# Patient Record
Sex: MALE | Race: ASIAN | NOT HISPANIC OR LATINO
[De-identification: names, ages, dates, MRNs, and addresses within clinical notes are randomized per-mention and may not be internally consistent; named-entity substitution may affect disease eponyms.]

---

## 2017-01-10 ENCOUNTER — MEDICATION RENEWAL (OUTPATIENT)
Age: 82
End: 2017-01-10

## 2017-01-12 ENCOUNTER — MEDICATION RENEWAL (OUTPATIENT)
Age: 82
End: 2017-01-12

## 2017-02-17 ENCOUNTER — MEDICATION RENEWAL (OUTPATIENT)
Age: 82
End: 2017-02-17

## 2017-03-07 ENCOUNTER — MEDICATION RENEWAL (OUTPATIENT)
Age: 82
End: 2017-03-07

## 2017-05-23 ENCOUNTER — APPOINTMENT (OUTPATIENT)
Dept: CARDIOLOGY | Facility: CLINIC | Age: 82
End: 2017-05-23

## 2017-05-23 ENCOUNTER — NON-APPOINTMENT (OUTPATIENT)
Age: 82
End: 2017-05-23

## 2017-05-23 VITALS
OXYGEN SATURATION: 91 % | DIASTOLIC BLOOD PRESSURE: 78 MMHG | BODY MASS INDEX: 30.07 KG/M2 | SYSTOLIC BLOOD PRESSURE: 144 MMHG | HEART RATE: 116 BPM | HEIGHT: 69 IN | WEIGHT: 203 LBS

## 2017-06-06 ENCOUNTER — RX RENEWAL (OUTPATIENT)
Age: 82
End: 2017-06-06

## 2017-06-06 ENCOUNTER — MEDICATION RENEWAL (OUTPATIENT)
Age: 82
End: 2017-06-06

## 2017-10-02 ENCOUNTER — MEDICATION RENEWAL (OUTPATIENT)
Age: 82
End: 2017-10-02

## 2017-11-09 ENCOUNTER — APPOINTMENT (OUTPATIENT)
Dept: CARDIOLOGY | Facility: CLINIC | Age: 82
End: 2017-11-09
Payer: MEDICARE

## 2017-11-09 VITALS
DIASTOLIC BLOOD PRESSURE: 76 MMHG | HEIGHT: 69 IN | WEIGHT: 202 LBS | HEART RATE: 96 BPM | BODY MASS INDEX: 29.92 KG/M2 | SYSTOLIC BLOOD PRESSURE: 142 MMHG

## 2017-11-09 DIAGNOSIS — K59.00 CONSTIPATION, UNSPECIFIED: ICD-10-CM

## 2017-11-09 PROCEDURE — 99215 OFFICE O/P EST HI 40 MIN: CPT

## 2017-11-09 PROCEDURE — 93000 ELECTROCARDIOGRAM COMPLETE: CPT

## 2017-12-13 ENCOUNTER — RX RENEWAL (OUTPATIENT)
Age: 82
End: 2017-12-13

## 2018-04-10 ENCOUNTER — MEDICATION RENEWAL (OUTPATIENT)
Age: 83
End: 2018-04-10

## 2018-05-17 ENCOUNTER — APPOINTMENT (OUTPATIENT)
Dept: CARDIOLOGY | Facility: CLINIC | Age: 83
End: 2018-05-17
Payer: MEDICARE

## 2018-05-17 ENCOUNTER — MEDICATION RENEWAL (OUTPATIENT)
Age: 83
End: 2018-05-17

## 2018-05-17 ENCOUNTER — NON-APPOINTMENT (OUTPATIENT)
Age: 83
End: 2018-05-17

## 2018-05-17 VITALS
OXYGEN SATURATION: 95 % | HEART RATE: 114 BPM | TEMPERATURE: 98.8 F | WEIGHT: 199 LBS | HEIGHT: 69 IN | BODY MASS INDEX: 29.47 KG/M2 | SYSTOLIC BLOOD PRESSURE: 150 MMHG | DIASTOLIC BLOOD PRESSURE: 73 MMHG

## 2018-05-17 DIAGNOSIS — M17.11 UNILATERAL PRIMARY OSTEOARTHRITIS, RIGHT KNEE: ICD-10-CM

## 2018-05-17 PROCEDURE — 93000 ELECTROCARDIOGRAM COMPLETE: CPT

## 2018-05-17 PROCEDURE — 99214 OFFICE O/P EST MOD 30 MIN: CPT

## 2018-05-17 RX ORDER — DICLOFENAC SODIUM 10 MG/G
1 GEL TOPICAL
Qty: 3 | Refills: 3 | Status: DISCONTINUED | COMMUNITY
Start: 2017-11-09 | End: 2018-05-17

## 2018-05-22 ENCOUNTER — RX RENEWAL (OUTPATIENT)
Age: 83
End: 2018-05-22

## 2018-10-05 DIAGNOSIS — R35.8 XXOTHER POLYURIA: ICD-10-CM

## 2018-11-19 ENCOUNTER — MEDICATION RENEWAL (OUTPATIENT)
Age: 83
End: 2018-11-19

## 2018-11-19 ENCOUNTER — NON-APPOINTMENT (OUTPATIENT)
Age: 83
End: 2018-11-19

## 2018-11-19 ENCOUNTER — APPOINTMENT (OUTPATIENT)
Dept: CARDIOLOGY | Facility: CLINIC | Age: 83
End: 2018-11-19
Payer: MEDICARE

## 2018-11-19 VITALS
BODY MASS INDEX: 29.47 KG/M2 | DIASTOLIC BLOOD PRESSURE: 80 MMHG | OXYGEN SATURATION: 95 % | SYSTOLIC BLOOD PRESSURE: 154 MMHG | WEIGHT: 199 LBS | HEIGHT: 69 IN | HEART RATE: 120 BPM

## 2018-11-19 PROCEDURE — 93000 ELECTROCARDIOGRAM COMPLETE: CPT

## 2018-11-19 PROCEDURE — 99214 OFFICE O/P EST MOD 30 MIN: CPT

## 2019-05-07 ENCOUNTER — APPOINTMENT (OUTPATIENT)
Dept: CARDIOLOGY | Facility: CLINIC | Age: 84
End: 2019-05-07

## 2019-05-15 ENCOUNTER — RX RENEWAL (OUTPATIENT)
Age: 84
End: 2019-05-15

## 2019-05-21 ENCOUNTER — APPOINTMENT (OUTPATIENT)
Dept: CARDIOLOGY | Facility: CLINIC | Age: 84
End: 2019-05-21
Payer: MEDICARE

## 2019-05-21 ENCOUNTER — NON-APPOINTMENT (OUTPATIENT)
Age: 84
End: 2019-05-21

## 2019-05-21 VITALS
HEART RATE: 107 BPM | WEIGHT: 199 LBS | BODY MASS INDEX: 30.16 KG/M2 | SYSTOLIC BLOOD PRESSURE: 120 MMHG | HEIGHT: 68 IN | DIASTOLIC BLOOD PRESSURE: 70 MMHG | OXYGEN SATURATION: 99 %

## 2019-05-21 DIAGNOSIS — M54.5 LOW BACK PAIN: ICD-10-CM

## 2019-05-21 PROCEDURE — 99215 OFFICE O/P EST HI 40 MIN: CPT

## 2019-05-21 PROCEDURE — 93000 ELECTROCARDIOGRAM COMPLETE: CPT

## 2019-05-24 ENCOUNTER — RX RENEWAL (OUTPATIENT)
Age: 84
End: 2019-05-24

## 2019-07-26 ENCOUNTER — APPOINTMENT (OUTPATIENT)
Dept: CARDIOLOGY | Facility: CLINIC | Age: 84
End: 2019-07-26
Payer: MEDICARE

## 2019-07-26 VITALS
HEIGHT: 68 IN | SYSTOLIC BLOOD PRESSURE: 134 MMHG | DIASTOLIC BLOOD PRESSURE: 70 MMHG | HEART RATE: 106 BPM | BODY MASS INDEX: 30.62 KG/M2 | OXYGEN SATURATION: 98 % | WEIGHT: 202 LBS

## 2019-07-26 PROCEDURE — 99214 OFFICE O/P EST MOD 30 MIN: CPT

## 2019-08-01 ENCOUNTER — APPOINTMENT (OUTPATIENT)
Dept: CARDIOLOGY | Facility: CLINIC | Age: 84
End: 2019-08-01
Payer: MEDICARE

## 2019-08-01 PROCEDURE — 93970 EXTREMITY STUDY: CPT

## 2019-08-13 ENCOUNTER — APPOINTMENT (OUTPATIENT)
Dept: CARDIOLOGY | Facility: CLINIC | Age: 84
End: 2019-08-13

## 2019-08-14 ENCOUNTER — APPOINTMENT (OUTPATIENT)
Dept: CARDIOLOGY | Facility: CLINIC | Age: 84
End: 2019-08-14
Payer: MEDICARE

## 2019-08-14 PROCEDURE — 93306 TTE W/DOPPLER COMPLETE: CPT

## 2019-08-16 ENCOUNTER — APPOINTMENT (OUTPATIENT)
Dept: CARDIOLOGY | Facility: CLINIC | Age: 84
End: 2019-08-16

## 2019-08-27 ENCOUNTER — OTHER (OUTPATIENT)
Age: 84
End: 2019-08-27

## 2019-08-28 ENCOUNTER — APPOINTMENT (OUTPATIENT)
Dept: CARDIOLOGY | Facility: CLINIC | Age: 84
End: 2019-08-28
Payer: MEDICARE

## 2019-08-28 VITALS — HEART RATE: 116 BPM | OXYGEN SATURATION: 96 % | DIASTOLIC BLOOD PRESSURE: 62 MMHG | SYSTOLIC BLOOD PRESSURE: 122 MMHG

## 2019-08-28 PROCEDURE — 99211 OFF/OP EST MAY X REQ PHY/QHP: CPT

## 2019-09-19 ENCOUNTER — APPOINTMENT (OUTPATIENT)
Dept: ORTHOPEDIC SURGERY | Facility: CLINIC | Age: 84
End: 2019-09-19

## 2019-09-23 ENCOUNTER — APPOINTMENT (OUTPATIENT)
Dept: ORTHOPEDIC SURGERY | Facility: CLINIC | Age: 84
End: 2019-09-23
Payer: MEDICARE

## 2019-09-23 VITALS — HEART RATE: 116 BPM | SYSTOLIC BLOOD PRESSURE: 129 MMHG | DIASTOLIC BLOOD PRESSURE: 82 MMHG

## 2019-09-23 VITALS — WEIGHT: 190 LBS | HEIGHT: 69 IN | BODY MASS INDEX: 28.14 KG/M2

## 2019-09-23 PROCEDURE — 72100 X-RAY EXAM L-S SPINE 2/3 VWS: CPT

## 2019-09-23 PROCEDURE — 99204 OFFICE O/P NEW MOD 45 MIN: CPT

## 2019-09-23 NOTE — REASON FOR VISIT
[Initial Visit] : an initial visit for [Degenerative Joint Disease] : degenerative joint disease [Back Pain] : back pain [Spouse] : spouse

## 2019-09-23 NOTE — HISTORY OF PRESENT ILLNESS
[de-identified] : This 84-year-old male is referred by Dr. Vladimir Dominguez for evaluation of spine related symptoms.  There is no history of injury.  He has a little more than 2 years of lower back pain without associated buttock or leg pain.  There is no Valsalva effect and he is not had associated neurologic symptoms.  There is no night pain.  The pain is worse with sitting and driving.  It is no worse walking.  It is worse standing for more than 10 or 15 minutes.  He was active at tennis until 4 years ago when he had a fall on the tennis court.  He is a long-term diabetic who is insulin-dependent.  His hemoglobin A1c is quite high at 9.7.  Several recent creatinines are normal. [Pain Location] : pain [Worsening] : worsening [6] : a maximum pain level of 6/10 [Bending] : worsened by bending [Lifting] : worsened by lifting [Prolonged Sitting] : worsened by prolonged sitting [Prolonged Standing] : worsened by prolonged standing [Sitting] : worsened by sitting [Walking] : not worsened by walking [Standing] : worsened by standing

## 2019-09-23 NOTE — PHYSICAL EXAM
[de-identified] : He is fully alert and oriented with a normal mood and affect.  He is in no acute distress.  He is overweight.  He ambulates with a slow and slightly unsteady gait.  He can tiptoe and heel walk.  There are no cutaneous abnormalities or palpable bony defects of the spine.  There is no evidence of shortness of breath or respiratory distress.  There is no paravertebral muscle spasm but sidebending is limited.  There is no sciatic or trochanteric tenderness.  Forward flexion of the spine is limited to 40 degrees by lower back pain.  Despite his long-term diabetes his lower extremity reflexes are 1+ and symmetrical.  Motor and sensory exam is normal and straight leg raising is negative to 90 degrees.  There is a mild bilateral restriction of range of motion of his knees which are stable.  Vascular exam reveals superficial varicosities.  There is no lymphedema.  There are no cutaneous abnormalities of the upper or lower extremities.  His upper extremities are normal to inspection and his elbows have a full range of motion with normal motor power and stability. [de-identified] : AP and lateral x-rays of the lumbar spine reveal significant multilevel degenerative changes.  There is relative loss of lumbar lordosis and some thoracolumbar kyphosis.  There are no destructive changes.

## 2019-09-23 NOTE — DISCUSSION/SUMMARY
[de-identified] : He will use moist heat and he has been started on diclofenac 75 mg twice a day as a nonsteroidal anti-inflammatory.  He will call if there are problems with the medication or worsening of his symptoms and I will see him for follow-up in 3-1/2 weeks. [Medication Risks Reviewed] : Medication risks reviewed

## 2019-10-15 ENCOUNTER — RX RENEWAL (OUTPATIENT)
Age: 84
End: 2019-10-15

## 2019-10-17 ENCOUNTER — APPOINTMENT (OUTPATIENT)
Dept: ORTHOPEDIC SURGERY | Facility: CLINIC | Age: 84
End: 2019-10-17
Payer: MEDICARE

## 2019-10-17 PROCEDURE — 99214 OFFICE O/P EST MOD 30 MIN: CPT

## 2019-10-17 NOTE — HISTORY OF PRESENT ILLNESS
[de-identified] : He has not had problems tolerating the diclofenac.  He has not seen much improvement but he is taking the medication only once a day.  He feels he might of had some help with acupuncture.

## 2019-10-17 NOTE — PHYSICAL EXAM
[de-identified] : He appears comfortable sitting today and straight leg raising remains negative at 90 degrees.

## 2019-10-17 NOTE — DISCUSSION/SUMMARY
[Medication Risks Reviewed] : Medication risks reviewed [de-identified] : He is also on Cymbalta for his peripheral neuropathy.  He will increase the diclofenac to twice a day as an usually prescribed and I will see him for follow-up in 5 weeks.  He has been given a prescription to obtain a liver function profile 1 week prior to his next visit.

## 2019-10-25 ENCOUNTER — NON-APPOINTMENT (OUTPATIENT)
Age: 84
End: 2019-10-25

## 2019-10-25 ENCOUNTER — APPOINTMENT (OUTPATIENT)
Dept: CARDIOLOGY | Facility: CLINIC | Age: 84
End: 2019-10-25
Payer: MEDICARE

## 2019-10-25 VITALS
SYSTOLIC BLOOD PRESSURE: 131 MMHG | BODY MASS INDEX: 28.29 KG/M2 | HEART RATE: 122 BPM | TEMPERATURE: 97.9 F | OXYGEN SATURATION: 94 % | HEIGHT: 69 IN | DIASTOLIC BLOOD PRESSURE: 84 MMHG | RESPIRATION RATE: 16 BRPM | WEIGHT: 191 LBS

## 2019-10-25 DIAGNOSIS — I45.10 UNSPECIFIED RIGHT BUNDLE-BRANCH BLOCK: ICD-10-CM

## 2019-10-25 DIAGNOSIS — R00.0 TACHYCARDIA, UNSPECIFIED: ICD-10-CM

## 2019-10-25 PROCEDURE — 93000 ELECTROCARDIOGRAM COMPLETE: CPT

## 2019-10-25 PROCEDURE — 99214 OFFICE O/P EST MOD 30 MIN: CPT

## 2019-10-29 ENCOUNTER — APPOINTMENT (OUTPATIENT)
Dept: ORTHOPEDIC SURGERY | Facility: CLINIC | Age: 84
End: 2019-10-29

## 2019-11-13 ENCOUNTER — MEDICATION RENEWAL (OUTPATIENT)
Age: 84
End: 2019-11-13

## 2019-11-21 ENCOUNTER — APPOINTMENT (OUTPATIENT)
Dept: ORTHOPEDIC SURGERY | Facility: CLINIC | Age: 84
End: 2019-11-21
Payer: MEDICARE

## 2019-11-21 DIAGNOSIS — G89.29 LOW BACK PAIN: ICD-10-CM

## 2019-11-21 DIAGNOSIS — M54.5 LOW BACK PAIN: ICD-10-CM

## 2019-11-21 PROCEDURE — 99214 OFFICE O/P EST MOD 30 MIN: CPT

## 2019-11-22 ENCOUNTER — APPOINTMENT (OUTPATIENT)
Dept: ORTHOPEDIC SURGERY | Facility: CLINIC | Age: 84
End: 2019-11-22

## 2019-11-22 PROBLEM — M54.5 CHRONIC BILATERAL LOW BACK PAIN WITHOUT SCIATICA: Status: ACTIVE | Noted: 2019-09-23

## 2019-11-22 NOTE — DISCUSSION/SUMMARY
[Medication Risks Reviewed] : Medication risks reviewed [de-identified] : He has been switched to ibuprofen 800 mg 3 times a day.  I have recommended a trial of physical therapy with modalities only.  I will see him for follow-up in 4 weeks.  We discussed that the next step would be a course of prednisone but it would obviously raise his blood sugars.

## 2019-11-22 NOTE — HISTORY OF PRESENT ILLNESS
[de-identified] : He has taken the diclofenac with regularity since his last visit and he any improvement.  In fact he feels his lower back pain may be slightly worse.  His liver function profile is normal. [Pain Location] : pain [Worsening] : worsening [5] : a maximum pain level of 5/10

## 2019-11-28 ENCOUNTER — EMERGENCY (EMERGENCY)
Facility: HOSPITAL | Age: 84
LOS: 1 days | Discharge: HOME | End: 2019-11-28
Attending: EMERGENCY MEDICINE
Payer: MEDICARE

## 2019-11-28 VITALS
RESPIRATION RATE: 18 BRPM | TEMPERATURE: 98 F | SYSTOLIC BLOOD PRESSURE: 164 MMHG | OXYGEN SATURATION: 99 % | HEART RATE: 93 BPM | DIASTOLIC BLOOD PRESSURE: 100 MMHG

## 2019-11-28 VITALS
SYSTOLIC BLOOD PRESSURE: 166 MMHG | HEART RATE: 104 BPM | HEIGHT: 70 IN | TEMPERATURE: 98 F | DIASTOLIC BLOOD PRESSURE: 91 MMHG | RESPIRATION RATE: 18 BRPM | WEIGHT: 192.02 LBS | OXYGEN SATURATION: 98 %

## 2019-11-28 LAB
ALBUMIN SERPL ELPH-MCNC: 4.6 G/DL — SIGNIFICANT CHANGE UP (ref 3.3–5)
ALP SERPL-CCNC: 53 U/L — SIGNIFICANT CHANGE UP (ref 40–120)
ALT FLD-CCNC: 23 U/L — SIGNIFICANT CHANGE UP (ref 10–45)
ANION GAP SERPL CALC-SCNC: 17 MMOL/L — SIGNIFICANT CHANGE UP (ref 5–17)
AST SERPL-CCNC: 18 U/L — SIGNIFICANT CHANGE UP (ref 10–40)
BASOPHILS # BLD AUTO: 0.03 K/UL — SIGNIFICANT CHANGE UP (ref 0–0.2)
BASOPHILS NFR BLD AUTO: 0.3 % — SIGNIFICANT CHANGE UP (ref 0–2)
BILIRUB SERPL-MCNC: 0.4 MG/DL — SIGNIFICANT CHANGE UP (ref 0.2–1.2)
BUN SERPL-MCNC: 31 MG/DL — HIGH (ref 7–23)
CALCIUM SERPL-MCNC: 9.7 MG/DL — SIGNIFICANT CHANGE UP (ref 8.4–10.5)
CHLORIDE SERPL-SCNC: 103 MMOL/L — SIGNIFICANT CHANGE UP (ref 96–108)
CO2 SERPL-SCNC: 25 MMOL/L — SIGNIFICANT CHANGE UP (ref 22–31)
CREAT SERPL-MCNC: 1.46 MG/DL — HIGH (ref 0.5–1.3)
EOSINOPHIL # BLD AUTO: 0.41 K/UL — SIGNIFICANT CHANGE UP (ref 0–0.5)
EOSINOPHIL NFR BLD AUTO: 3.9 % — SIGNIFICANT CHANGE UP (ref 0–6)
GLUCOSE SERPL-MCNC: 66 MG/DL — LOW (ref 70–99)
HCT VFR BLD CALC: 41.2 % — SIGNIFICANT CHANGE UP (ref 39–50)
HGB BLD-MCNC: 13.9 G/DL — SIGNIFICANT CHANGE UP (ref 13–17)
IMM GRANULOCYTES NFR BLD AUTO: 0.4 % — SIGNIFICANT CHANGE UP (ref 0–1.5)
LYMPHOCYTES # BLD AUTO: 1.4 K/UL — SIGNIFICANT CHANGE UP (ref 1–3.3)
LYMPHOCYTES # BLD AUTO: 13.4 % — SIGNIFICANT CHANGE UP (ref 13–44)
MCHC RBC-ENTMCNC: 30.8 PG — SIGNIFICANT CHANGE UP (ref 27–34)
MCHC RBC-ENTMCNC: 33.7 GM/DL — SIGNIFICANT CHANGE UP (ref 32–36)
MCV RBC AUTO: 91.2 FL — SIGNIFICANT CHANGE UP (ref 80–100)
MONOCYTES # BLD AUTO: 0.64 K/UL — SIGNIFICANT CHANGE UP (ref 0–0.9)
MONOCYTES NFR BLD AUTO: 6.1 % — SIGNIFICANT CHANGE UP (ref 2–14)
NEUTROPHILS # BLD AUTO: 7.95 K/UL — HIGH (ref 1.8–7.4)
NEUTROPHILS NFR BLD AUTO: 75.9 % — SIGNIFICANT CHANGE UP (ref 43–77)
NRBC # BLD: 0 /100 WBCS — SIGNIFICANT CHANGE UP (ref 0–0)
PLATELET # BLD AUTO: 208 K/UL — SIGNIFICANT CHANGE UP (ref 150–400)
POTASSIUM SERPL-MCNC: 3.8 MMOL/L — SIGNIFICANT CHANGE UP (ref 3.5–5.3)
POTASSIUM SERPL-SCNC: 3.8 MMOL/L — SIGNIFICANT CHANGE UP (ref 3.5–5.3)
PROT SERPL-MCNC: 7 G/DL — SIGNIFICANT CHANGE UP (ref 6–8.3)
RBC # BLD: 4.52 M/UL — SIGNIFICANT CHANGE UP (ref 4.2–5.8)
RBC # FLD: 12.3 % — SIGNIFICANT CHANGE UP (ref 10.3–14.5)
SODIUM SERPL-SCNC: 145 MMOL/L — SIGNIFICANT CHANGE UP (ref 135–145)
WBC # BLD: 10.47 K/UL — SIGNIFICANT CHANGE UP (ref 3.8–10.5)
WBC # FLD AUTO: 10.47 K/UL — SIGNIFICANT CHANGE UP (ref 3.8–10.5)

## 2019-11-28 PROCEDURE — 70450 CT HEAD/BRAIN W/O DYE: CPT | Mod: 26

## 2019-11-28 PROCEDURE — 85027 COMPLETE CBC AUTOMATED: CPT

## 2019-11-28 PROCEDURE — 99284 EMERGENCY DEPT VISIT MOD MDM: CPT | Mod: 25

## 2019-11-28 PROCEDURE — 99284 EMERGENCY DEPT VISIT MOD MDM: CPT | Mod: GC

## 2019-11-28 PROCEDURE — 90715 TDAP VACCINE 7 YRS/> IM: CPT

## 2019-11-28 PROCEDURE — 70450 CT HEAD/BRAIN W/O DYE: CPT

## 2019-11-28 PROCEDURE — 73130 X-RAY EXAM OF HAND: CPT

## 2019-11-28 PROCEDURE — 80053 COMPREHEN METABOLIC PANEL: CPT

## 2019-11-28 PROCEDURE — 73130 X-RAY EXAM OF HAND: CPT | Mod: 26,LT

## 2019-11-28 PROCEDURE — 73564 X-RAY EXAM KNEE 4 OR MORE: CPT

## 2019-11-28 PROCEDURE — 73564 X-RAY EXAM KNEE 4 OR MORE: CPT | Mod: 26,50

## 2019-11-28 PROCEDURE — 90471 IMMUNIZATION ADMIN: CPT

## 2019-11-28 RX ORDER — TETANUS TOXOID, REDUCED DIPHTHERIA TOXOID AND ACELLULAR PERTUSSIS VACCINE, ADSORBED 5; 2.5; 8; 8; 2.5 [IU]/.5ML; [IU]/.5ML; UG/.5ML; UG/.5ML; UG/.5ML
0.5 SUSPENSION INTRAMUSCULAR ONCE
Refills: 0 | Status: COMPLETED | OUTPATIENT
Start: 2019-11-28 | End: 2019-11-28

## 2019-11-28 RX ADMIN — TETANUS TOXOID, REDUCED DIPHTHERIA TOXOID AND ACELLULAR PERTUSSIS VACCINE, ADSORBED 0.5 MILLILITER(S): 5; 2.5; 8; 8; 2.5 SUSPENSION INTRAMUSCULAR at 13:40

## 2019-11-28 NOTE — ED PROVIDER NOTE - PATIENT PORTAL LINK FT
You can access the FollowMyHealth Patient Portal offered by [facility] by registering at the following website: [email or web address]. By joining [employer] [employer] [facility] [employer]’s FollowMyHealth portal, you will also be able to view your health information using other applications (apps) compatible with our system.

## 2019-11-28 NOTE — ED PROVIDER NOTE - PHYSICAL EXAMINATION
PHYSICAL EXAM:  GENERAL: NAD, well-developed  HEAD:  Atraumatic, Normocephalic  EYES: EOMI, PERRLA, conjunctiva and sclera clear  NECK: Supple, No JVD  CHEST/LUNG: Clear to auscultation bilaterally; No wheeze  HEART: Regular rate and rhythm; No murmurs, rubs, or gallops  ABDOMEN: Soft, Nontender, Nondistended; Bowel sounds present  EXTREMITIES:  2+ Peripheral Pulses, No clubbing, cyanosis, or edema  PSYCH: AAOx3  NEUROLOGY: 5/5 strength in all 4 extremities. CN II-XII intact. appropriate gait with cane  SKIN: large right frontal head hematoma with abrasion. b/l knee and left wrist abrasions

## 2019-11-28 NOTE — ED PROVIDER NOTE - ATTENDING CONTRIBUTION TO CARE
Private Physician Vladimir Mendieta  84y male pmh DM, HTN, BPH, HLD now off statins, Pt was walking to train and slip on loose gravel. Pt struck head, Left palm and jamila knees, pt ambulates with cane after fall safely. No nvdc,cp,sob,palps, abd pain. PE WDWN male awake HEENT abrasion/contusion rt forehead. neck supple neg by nexus, no battles racoons, Chest clear anterior & posterior abd soft +bs no mass/guarding/rebound, Pelvis stable. Chest clear anterior & posterior abd soft +bs neuro no focal defects. gcs 15 speeh fluent power 5.5 all extr, gait stable with assistance. MSK jamila abrasions knees, Left hand palmar abrasion, all tendons nl. sensation intactr  James Cruz MD, Facep

## 2019-11-28 NOTE — ED PROVIDER NOTE - OBJECTIVE STATEMENT
Patient is a 83 yo male with PMHx of DM (on insulin), HTN, BPH, HLD (now off statins) who presents s/p fall. Patient was about to board a train when he slipped on the gravel and hit his head. Patient denies any LOC and fall was witnessed by his wife. Patient then presented to the ED. Denies and LOC, changes in vision, CP, palpitations, SOB, abdominal pain, N/V, weakness.

## 2019-11-28 NOTE — ED ADULT NURSE NOTE - NSIMPLEMENTINTERV_GEN_ALL_ED
Implemented All Fall Risk Interventions:  [place] to call system. Call bell, personal items and telephone within reach. Instruct patient to call for assistance. Room bathroom lighting operational. Non-slip footwear when patient is off stretcher. Physically safe environment: no spills, clutter or unnecessary equipment. Stretcher in lowest position, wheels locked, appropriate side rails in place. Provide visual cue, wrist band, yellow gown, etc. Monitor gait and stability. Monitor for mental status changes and reorient to person, place, and time. Review medications for side effects contributing to fall risk. Reinforce activity limits and safety measures with patient and family.

## 2019-11-28 NOTE — ED PROVIDER NOTE - NS ED ROS FT
REVIEW OF SYSTEMS:    CONSTITUTIONAL: No weakness, fevers or chills  EYES/ENT: No visual changes;  No vertigo or throat pain   NECK: No pain or stiffness  RESPIRATORY: No cough, wheezing, hemoptysis; No shortness of breath  CARDIOVASCULAR: No chest pain or palpitations  GASTROINTESTINAL: No abdominal or epigastric pain. No nausea, vomiting, or hematemesis; No diarrhea or constipation. No melena or hematochezia.  GENITOURINARY: No dysuria, frequency or hematuria  NEUROLOGICAL: No numbness or weakness  SKIN: head, left wrist and b/l knee abrasion

## 2019-11-28 NOTE — ED PROVIDER NOTE - NSFOLLOWUPINSTRUCTIONS_ED_ALL_ED_FT
1. TAKE ALL MEDICATIONS AS DIRECTED.    2. FOR PAIN OR FEVER YOU CAN TAKE IBUPROFEN (MOTRIN, ADVIL) OR ACETAMINOPHEN (TYLENOL) AS NEEDED, AS DIRECTED ON PACKAGING.  3. FOLLOW UP WITH YOUR PRIMARY DOCTOR WITHIN 5 DAYS AS DIRECTED.  4. IF YOU HAD LABS OR IMAGING DONE, YOU WERE GIVEN COPIES OF ALL LABS AND/OR IMAGING RESULTS FROM YOUR ER VISIT--PLEASE TAKE THEM WITH YOU TO YOUR FOLLOW UP APPOINTMENTS.  5. IF NEEDED, CALL PATIENT ACCESS SERVICES AT [phone] TO FIND A PRIMARY CARE PHYSICIAN.  OR CALL [phone] TO MAKE AN APPOINTMENT WITH THE CLINIC.  6. RETURN TO THE ER FOR ANY WORSENING SYMPTOMS OR CONCERNS.

## 2019-11-28 NOTE — ED ADULT TRIAGE NOTE - CHIEF COMPLAINT QUOTE
head injury/ abrasion to head s/p tripped and fell on the ground,  witnessed by wife, no dizziness/ lightheaded prior to fall, no LOC, no blood thinners, able to ambulate with cane slowly afterwards as per wife.

## 2019-11-28 NOTE — ED PROVIDER NOTE - CLINICAL SUMMARY MEDICAL DECISION MAKING FREE TEXT BOX
Patient is a 85 yo male with PMHx of DM (on insulin), HTN, BPH, HLD (now off statins) who presents s/p fall. Will check labs and imaging. Will give TDAP

## 2019-11-28 NOTE — ED ADULT NURSE NOTE - OBJECTIVE STATEMENT
84 y.o. Male presents to the ED c/o head injury s/p trip and fall. Hx diabetes - on insulin. As per wife at bedside, [place] over gravel on the floor and fell forwards. Wife states she witnessed the whole episode. Pt was able to ambulate afterwards with cane. Ambulates with cane at baseline. Denies LOC. Abrasion on R top of head, b/l knees, L hand. Denies CP, SOB, N/V/D, urinary/bowel complications, fever/chills. A&Ox3. Neuro intact. Denies pain at the moment and blood thinner use. Pt is in no current distress. Comfort and safety provided. Will continue to monitor. Dr. Cruz at bedside for assessment. 84 y.o. Male presents to the ED c/o head injury s/p trip and fall. Hx diabetes - on insulin. As per wife at bedside, [place] over gravel on the floor and fell forwards. Wife states she witnessed the whole episode. Pt was able to ambulate afterwards with cane. Ambulates with cane at baseline. Denies LOC. Abrasion on R frontal lobe area, b/l knees, L hand. Denies CP, SOB, N/V/D, urinary/bowel complications, fever/chills. A&Ox3. Neuro intact. Denies pain at the moment and blood thinner use. Pt is in no current distress. Comfort and safety provided. Will continue to monitor. Dr. Cruz at bedside for assessment.

## 2019-12-02 ENCOUNTER — APPOINTMENT (OUTPATIENT)
Dept: CARDIOLOGY | Facility: CLINIC | Age: 84
End: 2019-12-02
Payer: MEDICARE

## 2019-12-02 VITALS
OXYGEN SATURATION: 96 % | DIASTOLIC BLOOD PRESSURE: 90 MMHG | HEIGHT: 69 IN | SYSTOLIC BLOOD PRESSURE: 160 MMHG | HEART RATE: 101 BPM | BODY MASS INDEX: 28.29 KG/M2 | WEIGHT: 191 LBS

## 2019-12-02 DIAGNOSIS — R05 COUGH: ICD-10-CM

## 2019-12-02 DIAGNOSIS — S09.93XA UNSPECIFIED INJURY OF FACE, INITIAL ENCOUNTER: ICD-10-CM

## 2019-12-02 DIAGNOSIS — R32 UNSPECIFIED URINARY INCONTINENCE: ICD-10-CM

## 2019-12-02 PROBLEM — E11.9 TYPE 2 DIABETES MELLITUS WITHOUT COMPLICATIONS: Chronic | Status: ACTIVE | Noted: 2019-11-28

## 2019-12-02 PROBLEM — E78.5 HYPERLIPIDEMIA, UNSPECIFIED: Chronic | Status: ACTIVE | Noted: 2019-11-28

## 2019-12-02 PROBLEM — I10 ESSENTIAL (PRIMARY) HYPERTENSION: Chronic | Status: ACTIVE | Noted: 2019-11-28

## 2019-12-02 PROBLEM — N40.0 BENIGN PROSTATIC HYPERPLASIA WITHOUT LOWER URINARY TRACT SYMPTOMS: Chronic | Status: ACTIVE | Noted: 2019-11-28

## 2019-12-02 PROCEDURE — 99215 OFFICE O/P EST HI 40 MIN: CPT

## 2019-12-02 PROCEDURE — 93000 ELECTROCARDIOGRAM COMPLETE: CPT

## 2019-12-02 RX ORDER — DICLOFENAC SODIUM 75 MG/1
75 TABLET, DELAYED RELEASE ORAL
Qty: 60 | Refills: 0 | Status: DISCONTINUED | COMMUNITY
Start: 2019-09-23 | End: 2019-12-02

## 2019-12-02 RX ORDER — METOPROLOL SUCCINATE 100 MG/1
100 TABLET, EXTENDED RELEASE ORAL DAILY
Qty: 90 | Refills: 3 | Status: DISCONTINUED | COMMUNITY
Start: 2019-10-25 | End: 2019-12-02

## 2019-12-03 LAB
APPEARANCE: CLEAR
BACTERIA: NEGATIVE
BILIRUBIN URINE: NEGATIVE
BLOOD URINE: NEGATIVE
CALCIUM OXALATE CRYSTALS: ABNORMAL
COLOR: NORMAL
GLUCOSE QUALITATIVE U: NORMAL
HYALINE CASTS: 0 /LPF
KETONES URINE: NEGATIVE
LEUKOCYTE ESTERASE URINE: NEGATIVE
MICROSCOPIC-UA: NORMAL
NITRITE URINE: NEGATIVE
PH URINE: 7.5
PROTEIN URINE: NORMAL
RED BLOOD CELLS URINE: 1 /HPF
SPECIFIC GRAVITY URINE: 1.02
SQUAMOUS EPITHELIAL CELLS: 3 /HPF
UROBILINOGEN URINE: NORMAL
WHITE BLOOD CELLS URINE: 4 /HPF

## 2019-12-04 LAB — BACTERIA UR CULT: NORMAL

## 2019-12-10 ENCOUNTER — APPOINTMENT (OUTPATIENT)
Dept: CARDIOLOGY | Facility: CLINIC | Age: 84
End: 2019-12-10
Payer: MEDICARE

## 2019-12-10 VITALS
HEIGHT: 69 IN | OXYGEN SATURATION: 96 % | BODY MASS INDEX: 29.03 KG/M2 | DIASTOLIC BLOOD PRESSURE: 82 MMHG | SYSTOLIC BLOOD PRESSURE: 156 MMHG | HEART RATE: 110 BPM | WEIGHT: 196 LBS

## 2019-12-10 PROCEDURE — 99213 OFFICE O/P EST LOW 20 MIN: CPT

## 2019-12-12 ENCOUNTER — RX RENEWAL (OUTPATIENT)
Age: 84
End: 2019-12-12

## 2019-12-15 ENCOUNTER — INPATIENT (INPATIENT)
Facility: HOSPITAL | Age: 84
LOS: 8 days | Discharge: NURSING, REHAB OR LONG-TERM CARE | DRG: 871 | End: 2019-12-24
Attending: INTERNAL MEDICINE | Admitting: INTERNAL MEDICINE
Payer: MEDICARE

## 2019-12-15 VITALS
TEMPERATURE: 98 F | HEART RATE: 94 BPM | OXYGEN SATURATION: 100 % | SYSTOLIC BLOOD PRESSURE: 131 MMHG | DIASTOLIC BLOOD PRESSURE: 71 MMHG | RESPIRATION RATE: 16 BRPM

## 2019-12-15 DIAGNOSIS — R29.6 REPEATED FALLS: ICD-10-CM

## 2019-12-15 LAB
ALBUMIN SERPL ELPH-MCNC: 3.5 G/DL — SIGNIFICANT CHANGE UP (ref 3.3–5)
ALP SERPL-CCNC: 58 U/L — SIGNIFICANT CHANGE UP (ref 40–120)
ALT FLD-CCNC: 31 U/L — SIGNIFICANT CHANGE UP (ref 10–45)
ANION GAP SERPL CALC-SCNC: 17 MMOL/L — SIGNIFICANT CHANGE UP (ref 5–17)
APPEARANCE UR: CLEAR — SIGNIFICANT CHANGE UP
APTT BLD: 22 SEC — LOW (ref 27.5–36.3)
AST SERPL-CCNC: 40 U/L — SIGNIFICANT CHANGE UP (ref 10–40)
BACTERIA # UR AUTO: NEGATIVE — SIGNIFICANT CHANGE UP
BASOPHILS # BLD AUTO: 0.01 K/UL — SIGNIFICANT CHANGE UP (ref 0–0.2)
BASOPHILS NFR BLD AUTO: 0.2 % — SIGNIFICANT CHANGE UP (ref 0–2)
BILIRUB SERPL-MCNC: 0.4 MG/DL — SIGNIFICANT CHANGE UP (ref 0.2–1.2)
BILIRUB UR-MCNC: NEGATIVE — SIGNIFICANT CHANGE UP
BUN SERPL-MCNC: 32 MG/DL — HIGH (ref 7–23)
CALCIUM SERPL-MCNC: 8.7 MG/DL — SIGNIFICANT CHANGE UP (ref 8.4–10.5)
CHLORIDE SERPL-SCNC: 103 MMOL/L — SIGNIFICANT CHANGE UP (ref 96–108)
CK SERPL-CCNC: 658 U/L — HIGH (ref 30–200)
CK SERPL-CCNC: 800 U/L — HIGH (ref 30–200)
CO2 SERPL-SCNC: 22 MMOL/L — SIGNIFICANT CHANGE UP (ref 22–31)
COLOR SPEC: YELLOW — SIGNIFICANT CHANGE UP
CREAT SERPL-MCNC: 1.21 MG/DL — SIGNIFICANT CHANGE UP (ref 0.5–1.3)
DIFF PNL FLD: ABNORMAL
EOSINOPHIL # BLD AUTO: 0.27 K/UL — SIGNIFICANT CHANGE UP (ref 0–0.5)
EOSINOPHIL NFR BLD AUTO: 5.9 % — SIGNIFICANT CHANGE UP (ref 0–6)
EPI CELLS # UR: 2 /HPF — SIGNIFICANT CHANGE UP
GAS PNL BLDV: SIGNIFICANT CHANGE UP
GLUCOSE BLDC GLUCOMTR-MCNC: 238 MG/DL — HIGH (ref 70–99)
GLUCOSE SERPL-MCNC: 265 MG/DL — HIGH (ref 70–99)
GLUCOSE UR QL: ABNORMAL
HCT VFR BLD CALC: 37.6 % — LOW (ref 39–50)
HGB BLD-MCNC: 12.3 G/DL — LOW (ref 13–17)
HYALINE CASTS # UR AUTO: 2 /LPF — SIGNIFICANT CHANGE UP (ref 0–2)
IMM GRANULOCYTES NFR BLD AUTO: 0.7 % — SIGNIFICANT CHANGE UP (ref 0–1.5)
INR BLD: 1.2 RATIO — HIGH (ref 0.88–1.16)
KETONES UR-MCNC: ABNORMAL
LEUKOCYTE ESTERASE UR-ACNC: NEGATIVE — SIGNIFICANT CHANGE UP
LYMPHOCYTES # BLD AUTO: 0.7 K/UL — LOW (ref 1–3.3)
LYMPHOCYTES # BLD AUTO: 15.3 % — SIGNIFICANT CHANGE UP (ref 13–44)
MAGNESIUM SERPL-MCNC: 1.5 MG/DL — LOW (ref 1.6–2.6)
MCHC RBC-ENTMCNC: 30.4 PG — SIGNIFICANT CHANGE UP (ref 27–34)
MCHC RBC-ENTMCNC: 32.7 GM/DL — SIGNIFICANT CHANGE UP (ref 32–36)
MCV RBC AUTO: 92.8 FL — SIGNIFICANT CHANGE UP (ref 80–100)
MONOCYTES # BLD AUTO: 0.53 K/UL — SIGNIFICANT CHANGE UP (ref 0–0.9)
MONOCYTES NFR BLD AUTO: 11.6 % — SIGNIFICANT CHANGE UP (ref 2–14)
NEUTROPHILS # BLD AUTO: 3.04 K/UL — SIGNIFICANT CHANGE UP (ref 1.8–7.4)
NEUTROPHILS NFR BLD AUTO: 66.3 % — SIGNIFICANT CHANGE UP (ref 43–77)
NITRITE UR-MCNC: NEGATIVE — SIGNIFICANT CHANGE UP
NRBC # BLD: 0 /100 WBCS — SIGNIFICANT CHANGE UP (ref 0–0)
PH UR: 6 — SIGNIFICANT CHANGE UP (ref 5–8)
PLATELET # BLD AUTO: 164 K/UL — SIGNIFICANT CHANGE UP (ref 150–400)
POTASSIUM SERPL-MCNC: 4.3 MMOL/L — SIGNIFICANT CHANGE UP (ref 3.5–5.3)
POTASSIUM SERPL-SCNC: 4.3 MMOL/L — SIGNIFICANT CHANGE UP (ref 3.5–5.3)
PROT SERPL-MCNC: 6.4 G/DL — SIGNIFICANT CHANGE UP (ref 6–8.3)
PROT UR-MCNC: ABNORMAL
PROTHROM AB SERPL-ACNC: 13.7 SEC — HIGH (ref 10–12.9)
RAPID RVP RESULT: SIGNIFICANT CHANGE UP
RBC # BLD: 4.05 M/UL — LOW (ref 4.2–5.8)
RBC # FLD: 12.8 % — SIGNIFICANT CHANGE UP (ref 10.3–14.5)
RBC CASTS # UR COMP ASSIST: 4 /HPF — SIGNIFICANT CHANGE UP (ref 0–4)
SODIUM SERPL-SCNC: 142 MMOL/L — SIGNIFICANT CHANGE UP (ref 135–145)
SP GR SPEC: 1.03 — HIGH (ref 1.01–1.02)
UROBILINOGEN FLD QL: NEGATIVE — SIGNIFICANT CHANGE UP
WBC # BLD: 4.58 K/UL — SIGNIFICANT CHANGE UP (ref 3.8–10.5)
WBC # FLD AUTO: 4.58 K/UL — SIGNIFICANT CHANGE UP (ref 3.8–10.5)
WBC UR QL: 3 /HPF — SIGNIFICANT CHANGE UP (ref 0–5)

## 2019-12-15 PROCEDURE — 71045 X-RAY EXAM CHEST 1 VIEW: CPT | Mod: 26

## 2019-12-15 PROCEDURE — 99285 EMERGENCY DEPT VISIT HI MDM: CPT

## 2019-12-15 PROCEDURE — 99223 1ST HOSP IP/OBS HIGH 75: CPT

## 2019-12-15 PROCEDURE — 71260 CT THORAX DX C+: CPT | Mod: 26

## 2019-12-15 PROCEDURE — 72128 CT CHEST SPINE W/O DYE: CPT | Mod: 26

## 2019-12-15 PROCEDURE — 70450 CT HEAD/BRAIN W/O DYE: CPT | Mod: 26

## 2019-12-15 PROCEDURE — 72125 CT NECK SPINE W/O DYE: CPT | Mod: 26

## 2019-12-15 PROCEDURE — 93010 ELECTROCARDIOGRAM REPORT: CPT

## 2019-12-15 PROCEDURE — 72131 CT LUMBAR SPINE W/O DYE: CPT | Mod: 26

## 2019-12-15 PROCEDURE — 74177 CT ABD & PELVIS W/CONTRAST: CPT | Mod: 26

## 2019-12-15 RX ORDER — MAGNESIUM SULFATE 500 MG/ML
2 VIAL (ML) INJECTION ONCE
Refills: 0 | Status: COMPLETED | OUTPATIENT
Start: 2019-12-15 | End: 2019-12-15

## 2019-12-15 RX ORDER — SODIUM CHLORIDE 9 MG/ML
1000 INJECTION, SOLUTION INTRAVENOUS
Refills: 0 | Status: DISCONTINUED | OUTPATIENT
Start: 2019-12-15 | End: 2019-12-24

## 2019-12-15 RX ORDER — INSULIN LISPRO 100/ML
VIAL (ML) SUBCUTANEOUS AT BEDTIME
Refills: 0 | Status: DISCONTINUED | OUTPATIENT
Start: 2019-12-15 | End: 2019-12-17

## 2019-12-15 RX ORDER — DEXTROSE 50 % IN WATER 50 %
25 SYRINGE (ML) INTRAVENOUS ONCE
Refills: 0 | Status: DISCONTINUED | OUTPATIENT
Start: 2019-12-15 | End: 2019-12-24

## 2019-12-15 RX ORDER — SODIUM CHLORIDE 9 MG/ML
1000 INJECTION INTRAMUSCULAR; INTRAVENOUS; SUBCUTANEOUS ONCE
Refills: 0 | Status: COMPLETED | OUTPATIENT
Start: 2019-12-15 | End: 2019-12-15

## 2019-12-15 RX ORDER — IPRATROPIUM/ALBUTEROL SULFATE 18-103MCG
3 AEROSOL WITH ADAPTER (GRAM) INHALATION ONCE
Refills: 0 | Status: COMPLETED | OUTPATIENT
Start: 2019-12-15 | End: 2019-12-15

## 2019-12-15 RX ORDER — DEXTROSE 50 % IN WATER 50 %
15 SYRINGE (ML) INTRAVENOUS ONCE
Refills: 0 | Status: DISCONTINUED | OUTPATIENT
Start: 2019-12-15 | End: 2019-12-24

## 2019-12-15 RX ORDER — DEXTROSE 50 % IN WATER 50 %
12.5 SYRINGE (ML) INTRAVENOUS ONCE
Refills: 0 | Status: DISCONTINUED | OUTPATIENT
Start: 2019-12-15 | End: 2019-12-24

## 2019-12-15 RX ORDER — INSULIN LISPRO 100/ML
VIAL (ML) SUBCUTANEOUS
Refills: 0 | Status: DISCONTINUED | OUTPATIENT
Start: 2019-12-15 | End: 2019-12-17

## 2019-12-15 RX ORDER — GLUCAGON INJECTION, SOLUTION 0.5 MG/.1ML
1 INJECTION, SOLUTION SUBCUTANEOUS ONCE
Refills: 0 | Status: DISCONTINUED | OUTPATIENT
Start: 2019-12-15 | End: 2019-12-24

## 2019-12-15 RX ADMIN — Medication 2 GRAM(S): at 18:30

## 2019-12-15 RX ADMIN — Medication 3 MILLILITER(S): at 21:56

## 2019-12-15 RX ADMIN — Medication 50 GRAM(S): at 17:14

## 2019-12-15 RX ADMIN — Medication 3 MILLILITER(S): at 19:37

## 2019-12-15 RX ADMIN — SODIUM CHLORIDE 1000 MILLILITER(S): 9 INJECTION INTRAMUSCULAR; INTRAVENOUS; SUBCUTANEOUS at 17:00

## 2019-12-15 NOTE — ED PROVIDER NOTE - PROGRESS NOTE DETAILS
CK elevated. Mg 1.5. d/w attending Dr. solano, will give 1L IVF and 2g Mg and pan-scan. - NIMA RiggsC MD Rachna: signed out to Dr. Welsh pending imaging, repeat ck after fluids, likely admission pt unable to get out of bed still 2/2 weakness. CTs non-actionable at this time. Will admit given unable to ambulate at baseline and unsafe dispo home. - NIMA RiggsC Second page sent out to hospitalist. - Too Nelson PA-C Spoke w/ hospitalist who advised Dr. Mendieta admits to Dr. Howe. D/w Dr. Howe, informed him of presentation, ED workup and results/interventions thus far and need for admission given not ambulating at his baseline and generalized weakness, likely needing PT eval in AM. He accepted pt under his service. Pt stable for admission. - Too Nelson PA-C

## 2019-12-15 NOTE — H&P ADULT - NSHPATTENDINGPLANDISCUSS_GEN_ALL_CORE
Dr. Howe who requested for initial evaluation/H&P and will assume care of this patient in am of 12/16/19.

## 2019-12-15 NOTE — ED ADULT NURSE NOTE - OBJECTIVE STATEMENT
84 yrs old male with h/o htn, elevated cholesterol , DM present to the ER for s/p fall. As per wife pt was in his bed when he slipped onto the carpeted floor at home yesterday pm. Pt endorsed that he felt weak and was unable to pull himself into a standing position. Pt denies hitting head or any new injury from the fall. Ecchymosis noted to face and neck as a result of previous fall.

## 2019-12-15 NOTE — ED ADULT NURSE NOTE - NSIMPLEMENTINTERV_GEN_ALL_ED
Implemented All Fall with Harm Risk Interventions:  [place] to call system. Call bell, personal items and telephone within reach. Instruct patient to call for assistance. Room bathroom lighting operational. Non-slip footwear when patient is off stretcher. Physically safe environment: no spills, clutter or unnecessary equipment. Stretcher in lowest position, wheels locked, appropriate side rails in place. Provide visual cue, wrist band, yellow gown, etc. Monitor gait and stability. Monitor for mental status changes and reorient to person, place, and time. Review medications for side effects contributing to fall risk. Reinforce activity limits and safety measures with patient and family. Provide visual clues: red socks.

## 2019-12-15 NOTE — H&P ADULT - HISTORY OF PRESENT ILLNESS
83 yo male PMhx DM on insulin, HTN, BPH, HLD presents to the ED c/o fall last night. Pt reports he slipped off the bed last night onto floor and was unable to get up, wife attempted to help him up but was unable so placed pillow under head and pt slept on floor overnight. This AM pt was still unable to get up so EMS was called. Pt reports he felt "out of it" yesterday, very weak. Wife reports pt normally ambulatory at home however has been unable to since fall 2/2 feeling weak. Of note was seen here 2.5 weeks ago for fall as well, had negative CTH and was discharged home. Pt denies pain currently, only c/o feeling weak. Denies numbness/tingling, headache, head trauma, chest pain, shortness of breath, cough, recent sick contacts, fever/chills, palpitations, abdominal pain, n/v/d, urinary urgency, frequency, dysuria, back pain, bowel/bladder incontinence. 85 yo male PMhx DM on insulin, HTN, BPH, HLD presents to the ED c/o fall the night prior with inability to get up and ambulate. Pt reports he slipped off the bed last night onto floor and was unable to get up, wife attempted to help him up but was unable so placed pillow under head and pt slept on floor overnight. This AM pt was still unable to get up so EMS was called. Pt reports he felt "out of it" yesterday, very weak. Wife reports pt normally ambulatory at home however has been unable to since fall 2/2 feeling weak. Of note was seen here 2.5 weeks ago for fall as well, had negative CTH and was discharged home. Pt denies pain currently, only c/o feeling weak. Denies numbness/tingling, headache, head trauma, chest pain, shortness of breath, cough, recent sick contacts, fever/chills, palpitations, abdominal pain, n/v/d, urinary urgency, frequency, dysuria, back pain, bowel/bladder incontinence. 83 yo male PMhx DM on insulin, HTN, BPH, HLD presents to the ED with unwitnessed fall the day prior with inability to get up and ambulate. Pt reports he slipped off the bed the day prior onto floor and was unable to get up.  Wife attempted to help him up but was unable, so placed pillow under head and pt slept on floor overnight. This AM pt was still unable to get up so EMS was called. Patient states he uses a cane to ambulate outside the house, but ambulate independently.  [facility] [place] [facility] in bruising and hematoma R chin, abd, and R frontal scalp, evaluated here in ED, and discharged home.  Patient has been feeling congested for the past couple days, decrease PO intake, and GRANT.  Patient denies fever (but on Ibuprofen 800 3x daily for back and leg pain), chest pain, cough, LOC, dysuria or abd pain.  No sick contact.  c/o gen weakness and inability to ambulate

## 2019-12-15 NOTE — ED PROVIDER NOTE - OBJECTIVE STATEMENT
83 yo male PMhx DM on insulin, HTN, BPH, HLD presents to the ED c/o fall last night. Pt reports he slipped off the bed last night onto floor and was unable to get up, wife attempted to help him up but was unable so placed pillow under head and pt slept on floor overnight. This AM pt was still unable to get up so EMS was called. Pt reports he felt "out of it" yesterday, very weak. Wife reports normally ambulatory at home however has been unable to since fall 2/2 feeling weak. Of note was seen here 2.5 weeks ago for fall as well, had negative CTH and was discharged home. Pt denies pain currently, only c/o feeling weak. Denies numbness/tingling, headache, head trauma, chest pain, shortness of breath, cough, recent sick contacts, fever/chills, palpitations, abdominal pain, n/v/d, urinary urgency, frequency, dysuria. 83 yo male PMhx DM on insulin, HTN, BPH, HLD presents to the ED c/o fall last night. Pt reports he slipped off the bed last night onto floor and was unable to get up, wife attempted to help him up but was unable so placed pillow under head and pt slept on floor overnight. This AM pt was still unable to get up so EMS was called. Pt reports he felt "out of it" yesterday, very weak. Wife reports pt normally ambulatory at home however has been unable to since fall 2/2 feeling weak. Of note was seen here 2.5 weeks ago for fall as well, had negative CTH and was discharged home. Pt denies pain currently, only c/o feeling weak. Denies numbness/tingling, headache, head trauma, chest pain, shortness of breath, cough, recent sick contacts, fever/chills, palpitations, abdominal pain, n/v/d, urinary urgency, frequency, dysuria. 85 yo male PMhx DM on insulin, HTN, BPH, HLD presents to the ED c/o fall last night. Pt reports he slipped off the bed last night onto floor and was unable to get up, wife attempted to help him up but was unable so placed pillow under head and pt slept on floor overnight. This AM pt was still unable to get up so EMS was called. Pt reports he felt "out of it" yesterday, very weak. Wife reports pt normally ambulatory at home however has been unable to since fall 2/2 feeling weak. Of note was seen here 2.5 weeks ago for fall as well, had negative CTH and was discharged home. Pt denies pain currently, only c/o feeling weak. Denies numbness/tingling, headache, head trauma, chest pain, shortness of breath, cough, recent sick contacts, fever/chills, palpitations, abdominal pain, n/v/d, urinary urgency, frequency, dysuria, back pain, bowel/bladder incontinence.

## 2019-12-15 NOTE — H&P ADULT - ASSESSMENT
83 yo male PMhx DM on insulin, HTN, BPH, HLD p/w inability to ambulate after a fall, congestion with GRANT 85 yo male PMhx DM on insulin, HTN, BPH, HLD p/w generalized weakness and inability to ambulate after a fall, congestion with GRANT

## 2019-12-15 NOTE — H&P ADULT - NEGATIVE GENERAL SYMPTOMS
History and Physical reviewed; I have examined the patient and there are no pertinent changes. Carla Brown MD, MD   7:24 AM 12/19/2017  [facility], [address]  [email or web address] no chills/no fever

## 2019-12-15 NOTE — ED ADULT NURSE REASSESSMENT NOTE - NS ED NURSE REASSESS COMMENT FT1
Admitting NP Elvira at bedside. Instructed to place pt on 2L NC and administer Duoneb. Will continue to monitor. Admitting NP Elvira at bedside. Instructed to place pt on 2L NC and administer Duoneb. Pt tolerating well and breathing with ease, sat-ing at 100% on 2L NC. Will continue to monitor. Admitting NP Elvira at bedside. Instructed to place pt on 2L NC and administer Duoneb. Pt tolerating well and breathing with ease, sat-ing at 100% on 2L NC. No c/o SOB or trouble breathing at this time. Will continue to monitor.

## 2019-12-15 NOTE — H&P ADULT - NSICDXPASTSURGICALHX_GEN_ALL_CORE_FT
PAST SURGICAL HISTORY:  No significant past surgical history PAST SURGICAL HISTORY:  H/O arthroscopy R knee

## 2019-12-15 NOTE — H&P ADULT - PROBLEM SELECTOR PLAN 1
likely due to recent fall and respiratory infection - afebrile due to using Ibuprofen 800mg ATC for back and leg pain   - IVF, eval and treat infection, PT eval with fall precaustion

## 2019-12-15 NOTE — ED ADULT NURSE REASSESSMENT NOTE - NS ED NURSE REASSESS COMMENT FT1
Received report from Tarik MELISSA. Pt noted to be tachypneic and have b/l wheezing, sat-ing at 96% on RA. PA aware and ordered Duoneb treatment. Pt tolerating well and sat-ing at 99%. Will continue to monitor.

## 2019-12-15 NOTE — ED PROVIDER NOTE - SKIN, MLM
+Ecchymosis as noted above in HENMT exam. Otherwise, skin normal color for race, warm, dry and intact. No evidence of rash.

## 2019-12-15 NOTE — ED PROVIDER NOTE - ENMT, MLM
Airway patent, Nasal mucosa clear. Mouth with normal mucosa. Throat has no vesicles, no oropharyngeal exudates and uvula is midline. +multiple healing ecchymosis noted to face including R frontal head and R jaw/chin

## 2019-12-15 NOTE — ED PROVIDER NOTE - ATTENDING CONTRIBUTION TO CARE
84 year old male past medical history DM (on insulin), HTN, BPH, hyperlipidemia, who presents to the ED for fall. Patient reports he slipped off the bed yesterday afternoon and he was too weak to get up. He slept on the floor overnight. He reports no pain now. Just reports generalized weakness. No LOC. Not on A/C. No fevers, chills, nausea, vomiting. had a fall this past thanksgiving with old ecchymosis on face that is unchanged. Family reports patient very weak lately.     Patient reports  headache, dizziness, changes in vision, ear pain, sore throat, chest pain, shortness of breath, abdominal pain, diarrhea, constipation, dysuria, or muscle/joint pain.     Gen: Well appearing, Well Developed, No Acute Distress  HEENT: Normocephalic, Atraumatic, face with old ecchymosis below bilateral eyes and chin, Pupils equal round and reactive to light, Mucous Membranes Moist, Trachea Midline   Cards: Regular Rate and Rhythm, No murmurs, No JVD, No pedal edema  Pulm: Lungs clear bilaterally, no respiratory distress, no accessory muscle use  Abd: abdomen soft, supple, tender rlq, no rebound or guarding, bowel sounds normoactive x4, no masses, no pulsatile masses,   Ext: moving all extremities, pulses x4 strong and regular  Neuro: AxOx3, no focal neuro deficits   Psych: normal mood and behavior   Back: midline spinal tenderness from cervical to lumbar no step offs or erythema.   Skin: warm, dry, no rash     A/P: 84 year old male past medical history DM (on insulin), HTN, BPH, hyperlipidemia, who presents to the ED for fall. Patient reports he slipped off the bed yesterday afternoon and he was too weak to get up. Will work patient up for generalized weakness. Not complaining of pain but on exam tender from cervical spine to lumbar. Will obtain trauma scans ct head, neck, chest, abdomen, and pelvis. If workup negative possible admission for evaluation by PT/OT and rehab given recent falls and weakness

## 2019-12-15 NOTE — H&P ADULT - NSICDXPASTMEDICALHX_GEN_ALL_CORE_FT
PAST MEDICAL HISTORY:  BPH (benign prostatic hyperplasia)     Diabetes mellitus type 2 in nonobese     HTN (hypertension)     Hyperlipidemia

## 2019-12-15 NOTE — H&P ADULT - PROBLEM SELECTOR PLAN 2
Fall during Thanksgiving (tripped outside the house, and fell forward with head/facial/anterior abd bruising) was mechanical in nature.  This time, fall was due to generalized weakness and slipped OOB, possibly due to recent respiratory illness..  - PT eval and fall precaution

## 2019-12-15 NOTE — H&P ADULT - EYES
Brandon Waterman  is a 28 year old  male  Who came to clinic saying he has uti because he has dysuria and urinary frquency  He is active with  one male partner only for 8yrs     Sexual history:     Contraception use of condoms--no    Multiple partners-------------no      History of previous STD-------no    Pmhx; none     Social History     Social History   • Marital status: N/A     Spouse name: N/A   • Number of children: N/A   • Years of education: N/A     Occupational History   • Not on file.     Social History Main Topics   • Smoking status: Never Smoker   • Smokeless tobacco: Never Used   • Alcohol use occassional    • Drug use: None    • Sexual activity: Yes and sexually active with one male partner      Other Topics Concern   • Not on file     Social History Narrative   • No narrative on file         PHYSICAL EXAMINATION:    Visit Vitals  /81   Pulse 83   Temp 97.6 °F (36.4 °C) (Oral)   Resp 20   Ht 5' 6\" (1.676 m)   Wt 63.5 kg   SpO2 98%   BMI 22.60 kg/m²       THROAT: normal without tonsillar hypertrophy, no erythema, exudates or mucosal lesions  NECK:  supple, no lymphadenopathy  ABDOMEN:   Soft, nontender, no masses, no hepatosplenomegaly, no distension, bowel sounds are normoactive.  MALE : Normal external male genitalia, penis is circumcised and greenish yellow urethral discharge noted , testicles are non tender,  No inginal hernia's are palpated.  LUNGS: Clear to auscultation and percussion. No wheezes, rales, rhonchi noted.  HEART: Positive S1 and S2 . No S3 or S4. RRR.  ABDOMEN: Soft, nontender, non-distended. Bowel sounds are present and are normoactive.  No organomegaly noted. No rebound or guarding noted.         Assessment/Plan:  (R35.0) Frequent urination  (primary encounter diagnosis)  (N34.2) Urethritis  (Z11.3) Screening examination for STD (sexually transmitted disease)  Plan: URINE MICRO WITH POC MACRO(DIPSTICK) RESULTS,         URINE CULTURE, CHLAMYDIA/GC BY NUCLEIC ACID          AMPLIFICATION, cefTRIAXone (ROCEPHIN) 1 g in         lidocaine 1 % for IM injection, DISCONTINUED:         sulfamethoxazole-trimethoprim (BACTRIM DS)         800-160 MG per tablet, DISCONTINUED:         azithromycin (ZITHROMAX) 500 MG tablet  >Call in 3 days for std results.   1- See PMD or [facility]IC for followup test of cure.  2- Partner needs notification and treatment.  3- No sex until all parties with negative test of cure.   4- Use condoms.     detailed exam EOMI/conjunctiva clear

## 2019-12-15 NOTE — ED PROVIDER NOTE - NONTENDER LOCATION
+old healing yellow bruise noted to L mid abdomen, no rebound, guarding or rigidity noted. +old healing yellow bruise noted to L mid abdomen. Nontender all abdominal quadrants, no rebound, guarding or rigidity noted.

## 2019-12-15 NOTE — H&P ADULT - ADDITIONAL PE
T(F): 98.1 (15 Dec 2019 21:36), Max: 98.7 (15 Dec 2019 18:03)  HR: 94 - 104  BP: 155/83 (15 Dec 2019 21:57) (131/71 - 172/98)  RR: 16 - 26  SpO2: 95% - 100%

## 2019-12-15 NOTE — H&P ADULT - PROBLEM SELECTOR PLAN 4
- will hold all oral agents  - will monitor finger sticks and cover with low dose ISS  - will decrease basal insulin to half with decrease PO intake

## 2019-12-15 NOTE — H&P ADULT - PROBLEM SELECTOR PLAN 7
Wife and patient unable to clarify Losartan/HCTZ,  Patient was told to discontinue some meds and started on Labetalol with parameters.

## 2019-12-15 NOTE — ED PROVIDER NOTE - EXTREMITY EXAM
No obvious deformity of extremities. No shortening, IR/ER of lower extremities. No bony joint tenderness, no muscular ttp. Pelvis stable, no hip ttp b/l. Full AROM UE/LE b/l with 5/5 strength/no deformity, pain or tenderness, no restriction of movement

## 2019-12-15 NOTE — H&P ADULT - PROBLEM SELECTOR PLAN 3
HR >100, RR 26, but not hypoxic, clinically appears some distress with increase WOB  - Suspect viral respiratory infection with significant brochospasm  - will treat with Duonebs, inhaled steroid, and O2 supplementation  - BIPAP if worsen or to decrease WOB

## 2019-12-15 NOTE — ED ADULT NURSE REASSESSMENT NOTE - NS ED NURSE REASSESS COMMENT FT1
Admitting resident called about pt b/l wheezing, pt sating at 96% on RA and tachypneic. MD to come to pt bedside. Admitting NP called about pt b/l wheezing, pt sating at 96% on RA and tachypneic. NP to come to pt bedside.

## 2019-12-15 NOTE — H&P ADULT - NSHPSOCIALHISTORY_GEN_ALL_CORE
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

## 2019-12-16 ENCOUNTER — TRANSCRIPTION ENCOUNTER (OUTPATIENT)
Age: 84
End: 2019-12-16

## 2019-12-16 DIAGNOSIS — R29.6 REPEATED FALLS: ICD-10-CM

## 2019-12-16 DIAGNOSIS — Z98.890 OTHER SPECIFIED POSTPROCEDURAL STATES: Chronic | ICD-10-CM

## 2019-12-16 DIAGNOSIS — I10 ESSENTIAL (PRIMARY) HYPERTENSION: ICD-10-CM

## 2019-12-16 DIAGNOSIS — N40.0 BENIGN PROSTATIC HYPERPLASIA WITHOUT LOWER URINARY TRACT SYMPTOMS: ICD-10-CM

## 2019-12-16 DIAGNOSIS — J96.00 ACUTE RESPIRATORY FAILURE, UNSPECIFIED WHETHER WITH HYPOXIA OR HYPERCAPNIA: ICD-10-CM

## 2019-12-16 DIAGNOSIS — Z71.89 OTHER SPECIFIED COUNSELING: ICD-10-CM

## 2019-12-16 DIAGNOSIS — E11.69 TYPE 2 DIABETES MELLITUS WITH OTHER SPECIFIED COMPLICATION: ICD-10-CM

## 2019-12-16 DIAGNOSIS — R53.1 WEAKNESS: ICD-10-CM

## 2019-12-16 DIAGNOSIS — Z79.899 OTHER LONG TERM (CURRENT) DRUG THERAPY: ICD-10-CM

## 2019-12-16 LAB
ANION GAP SERPL CALC-SCNC: 21 MMOL/L — HIGH (ref 5–17)
B PERT DNA SPEC QL NAA+PROBE: SIGNIFICANT CHANGE UP
BUN SERPL-MCNC: 27 MG/DL — HIGH (ref 7–23)
C PNEUM DNA SPEC QL NAA+PROBE: SIGNIFICANT CHANGE UP
CALCIUM SERPL-MCNC: 8.4 MG/DL — SIGNIFICANT CHANGE UP (ref 8.4–10.5)
CHLORIDE SERPL-SCNC: 103 MMOL/L — SIGNIFICANT CHANGE UP (ref 96–108)
CO2 SERPL-SCNC: 19 MMOL/L — LOW (ref 22–31)
CREAT SERPL-MCNC: 1.17 MG/DL — SIGNIFICANT CHANGE UP (ref 0.5–1.3)
CULTURE RESULTS: SIGNIFICANT CHANGE UP
FLUAV H1 2009 PAND RNA SPEC QL NAA+PROBE: SIGNIFICANT CHANGE UP
FLUAV H1 RNA SPEC QL NAA+PROBE: SIGNIFICANT CHANGE UP
FLUAV H3 RNA SPEC QL NAA+PROBE: SIGNIFICANT CHANGE UP
FLUAV SUBTYP SPEC NAA+PROBE: SIGNIFICANT CHANGE UP
FLUBV RNA SPEC QL NAA+PROBE: SIGNIFICANT CHANGE UP
GLUCOSE BLDC GLUCOMTR-MCNC: 204 MG/DL — HIGH (ref 70–99)
GLUCOSE BLDC GLUCOMTR-MCNC: 205 MG/DL — HIGH (ref 70–99)
GLUCOSE BLDC GLUCOMTR-MCNC: 220 MG/DL — HIGH (ref 70–99)
GLUCOSE BLDC GLUCOMTR-MCNC: 287 MG/DL — HIGH (ref 70–99)
GLUCOSE BLDC GLUCOMTR-MCNC: 328 MG/DL — HIGH (ref 70–99)
GLUCOSE SERPL-MCNC: 269 MG/DL — HIGH (ref 70–99)
HADV DNA SPEC QL NAA+PROBE: SIGNIFICANT CHANGE UP
HBA1C BLD-MCNC: 7.9 % — HIGH (ref 4–5.6)
HCOV PNL SPEC NAA+PROBE: SIGNIFICANT CHANGE UP
HCT VFR BLD CALC: 35.7 % — LOW (ref 39–50)
HGB BLD-MCNC: 11.7 G/DL — LOW (ref 13–17)
HMPV RNA SPEC QL NAA+PROBE: SIGNIFICANT CHANGE UP
HPIV1 RNA SPEC QL NAA+PROBE: SIGNIFICANT CHANGE UP
HPIV2 RNA SPEC QL NAA+PROBE: SIGNIFICANT CHANGE UP
HPIV3 RNA SPEC QL NAA+PROBE: SIGNIFICANT CHANGE UP
HPIV4 RNA SPEC QL NAA+PROBE: SIGNIFICANT CHANGE UP
LACTATE SERPL-SCNC: 1 MMOL/L — SIGNIFICANT CHANGE UP (ref 0.7–2)
MAGNESIUM SERPL-MCNC: 1.9 MG/DL — SIGNIFICANT CHANGE UP (ref 1.6–2.6)
MCHC RBC-ENTMCNC: 30.3 PG — SIGNIFICANT CHANGE UP (ref 27–34)
MCHC RBC-ENTMCNC: 32.8 GM/DL — SIGNIFICANT CHANGE UP (ref 32–36)
MCV RBC AUTO: 92.5 FL — SIGNIFICANT CHANGE UP (ref 80–100)
PLATELET # BLD AUTO: 180 K/UL — SIGNIFICANT CHANGE UP (ref 150–400)
POTASSIUM SERPL-MCNC: 3.6 MMOL/L — SIGNIFICANT CHANGE UP (ref 3.5–5.3)
POTASSIUM SERPL-SCNC: 3.6 MMOL/L — SIGNIFICANT CHANGE UP (ref 3.5–5.3)
RAPID RVP RESULT: SIGNIFICANT CHANGE UP
RBC # BLD: 3.86 M/UL — LOW (ref 4.2–5.8)
RBC # FLD: 13 % — SIGNIFICANT CHANGE UP (ref 10.3–14.5)
RSV RNA SPEC QL NAA+PROBE: SIGNIFICANT CHANGE UP
RV+EV RNA SPEC QL NAA+PROBE: SIGNIFICANT CHANGE UP
SODIUM SERPL-SCNC: 143 MMOL/L — SIGNIFICANT CHANGE UP (ref 135–145)
SPECIMEN SOURCE: SIGNIFICANT CHANGE UP
WBC # BLD: 5.62 K/UL — SIGNIFICANT CHANGE UP (ref 3.8–10.5)
WBC # FLD AUTO: 5.62 K/UL — SIGNIFICANT CHANGE UP (ref 3.8–10.5)

## 2019-12-16 PROCEDURE — 99223 1ST HOSP IP/OBS HIGH 75: CPT

## 2019-12-16 PROCEDURE — 71045 X-RAY EXAM CHEST 1 VIEW: CPT | Mod: 26

## 2019-12-16 RX ORDER — IPRATROPIUM/ALBUTEROL SULFATE 18-103MCG
3 AEROSOL WITH ADAPTER (GRAM) INHALATION EVERY 6 HOURS
Refills: 0 | Status: DISCONTINUED | OUTPATIENT
Start: 2019-12-16 | End: 2019-12-19

## 2019-12-16 RX ORDER — INSULIN GLARGINE 100 [IU]/ML
40 INJECTION, SOLUTION SUBCUTANEOUS AT BEDTIME
Refills: 0 | Status: DISCONTINUED | OUTPATIENT
Start: 2019-12-16 | End: 2019-12-17

## 2019-12-16 RX ORDER — TAMSULOSIN HYDROCHLORIDE 0.4 MG/1
0.4 CAPSULE ORAL AT BEDTIME
Refills: 0 | Status: DISCONTINUED | OUTPATIENT
Start: 2019-12-16 | End: 2019-12-24

## 2019-12-16 RX ORDER — INSULIN GLARGINE 100 [IU]/ML
35 INJECTION, SOLUTION SUBCUTANEOUS ONCE
Refills: 0 | Status: COMPLETED | OUTPATIENT
Start: 2019-12-16 | End: 2019-12-16

## 2019-12-16 RX ORDER — LORATADINE 10 MG/1
10 TABLET ORAL DAILY
Refills: 0 | Status: DISCONTINUED | OUTPATIENT
Start: 2019-12-16 | End: 2019-12-24

## 2019-12-16 RX ORDER — OXYMETAZOLINE HYDROCHLORIDE 0.5 MG/ML
1 SPRAY NASAL
Refills: 0 | Status: DISCONTINUED | OUTPATIENT
Start: 2019-12-16 | End: 2019-12-19

## 2019-12-16 RX ORDER — PSEUDOEPHEDRINE HCL 30 MG
30 TABLET ORAL
Refills: 0 | Status: DISCONTINUED | OUTPATIENT
Start: 2019-12-16 | End: 2019-12-19

## 2019-12-16 RX ORDER — BUDESONIDE, MICRONIZED 100 %
0.5 POWDER (GRAM) MISCELLANEOUS
Refills: 0 | Status: DISCONTINUED | OUTPATIENT
Start: 2019-12-16 | End: 2019-12-19

## 2019-12-16 RX ORDER — ACETAMINOPHEN 500 MG
650 TABLET ORAL ONCE
Refills: 0 | Status: COMPLETED | OUTPATIENT
Start: 2019-12-16 | End: 2019-12-16

## 2019-12-16 RX ORDER — CHOLECALCIFEROL (VITAMIN D3) 125 MCG
2000 CAPSULE ORAL DAILY
Refills: 0 | Status: DISCONTINUED | OUTPATIENT
Start: 2019-12-16 | End: 2019-12-24

## 2019-12-16 RX ORDER — FLUTICASONE PROPIONATE 50 MCG
1 SPRAY, SUSPENSION NASAL
Refills: 0 | Status: DISCONTINUED | OUTPATIENT
Start: 2019-12-16 | End: 2019-12-24

## 2019-12-16 RX ORDER — MULTIVIT-MIN/FERROUS GLUCONATE 9 MG/15 ML
1 LIQUID (ML) ORAL DAILY
Refills: 0 | Status: DISCONTINUED | OUTPATIENT
Start: 2019-12-16 | End: 2019-12-24

## 2019-12-16 RX ORDER — HEPARIN SODIUM 5000 [USP'U]/ML
5000 INJECTION INTRAVENOUS; SUBCUTANEOUS EVERY 12 HOURS
Refills: 0 | Status: DISCONTINUED | OUTPATIENT
Start: 2019-12-16 | End: 2019-12-18

## 2019-12-16 RX ADMIN — Medication 3 MILLILITER(S): at 12:36

## 2019-12-16 RX ADMIN — Medication 2: at 18:13

## 2019-12-16 RX ADMIN — Medication 1 SPRAY(S): at 18:10

## 2019-12-16 RX ADMIN — Medication 30 MILLIGRAM(S): at 18:10

## 2019-12-16 RX ADMIN — Medication 1 TABLET(S): at 18:12

## 2019-12-16 RX ADMIN — HEPARIN SODIUM 5000 UNIT(S): 5000 INJECTION INTRAVENOUS; SUBCUTANEOUS at 06:37

## 2019-12-16 RX ADMIN — Medication 2000 UNIT(S): at 12:36

## 2019-12-16 RX ADMIN — Medication 3 MILLILITER(S): at 18:12

## 2019-12-16 RX ADMIN — INSULIN GLARGINE 35 UNIT(S): 100 INJECTION, SOLUTION SUBCUTANEOUS at 03:21

## 2019-12-16 RX ADMIN — Medication 2: at 12:31

## 2019-12-16 RX ADMIN — Medication 1 TABLET(S): at 18:13

## 2019-12-16 RX ADMIN — TAMSULOSIN HYDROCHLORIDE 0.4 MILLIGRAM(S): 0.4 CAPSULE ORAL at 22:46

## 2019-12-16 RX ADMIN — Medication 650 MILLIGRAM(S): at 09:30

## 2019-12-16 RX ADMIN — OXYMETAZOLINE HYDROCHLORIDE 1 SPRAY(S): 0.5 SPRAY NASAL at 18:11

## 2019-12-16 RX ADMIN — Medication 3 MILLILITER(S): at 06:37

## 2019-12-16 RX ADMIN — Medication 1: at 03:24

## 2019-12-16 RX ADMIN — Medication 0.5 MILLIGRAM(S): at 18:12

## 2019-12-16 RX ADMIN — Medication 2: at 22:42

## 2019-12-16 RX ADMIN — Medication 50 MILLIGRAM(S): at 18:11

## 2019-12-16 RX ADMIN — HEPARIN SODIUM 5000 UNIT(S): 5000 INJECTION INTRAVENOUS; SUBCUTANEOUS at 18:13

## 2019-12-16 RX ADMIN — Medication 2: at 09:22

## 2019-12-16 RX ADMIN — INSULIN GLARGINE 40 UNIT(S): 100 INJECTION, SOLUTION SUBCUTANEOUS at 22:41

## 2019-12-16 RX ADMIN — Medication 0.5 MILLIGRAM(S): at 03:19

## 2019-12-16 NOTE — DISCHARGE NOTE NURSING/CASE MANAGEMENT/SOCIAL WORK - NSDCPEPTSTRK_GEN_ALL_CORE
Prescribed medications/Call 911 for stroke/Stroke education booklet/Stroke warning signs and symptoms/Signs and symptoms of stroke/Stroke support groups for patients, families, and friends/Need for follow up after discharge/Risk factors for stroke

## 2019-12-16 NOTE — DISCHARGE NOTE NURSING/CASE MANAGEMENT/SOCIAL WORK - PATIENT PORTAL LINK FT
You can access the FollowMyHealth Patient Portal offered by [facility] by registering at the following website: [email or web address]. By joining [facility]’s FollowMyHealth portal, you will also be able to view your health information using other applications (apps) compatible with our system.

## 2019-12-16 NOTE — PROGRESS NOTE ADULT - SUBJECTIVE AND OBJECTIVE BOX
HOLLIE STEVENS  84y Male  MRN:[id number]    Patient is a 84y old  Male who presents with a chief complaint of s/p fall (15 Dec 2019 22:05)    HPI:  85 yo male PMhx DM on insulin, HTN, BPH, HLD presents to the ED with unwitnessed fall the day prior with inability to get up and ambulate. Pt reports he slipped off the bed the day prior onto floor and was unable to get up.  Wife attempted to help him up but was unable, so placed pillow under head and pt slept on floor overnight. This AM pt was still unable to get up so EMS was called. Patient states he uses a cane to ambulate outside the house, but ambulate independently.  Patient had a trip and fall outside the house in , resulting in bruising and hematoma R chin, abd, and R frontal scalp, evaluated here in ED, and discharged home.  Patient has been feeling congested for the past couple days, decrease PO intake, and GRANT.  Patient denies fever (but on Ibuprofen 800 3x daily for back and leg pain), chest pain, cough, LOC, dysuria or abd pain.  No sick contact.  c/o gen weakness and inability to ambulate (15 Dec 2019 22:05)      Patient seen and evaluated at bedside. No acute events overnight except as noted.    Interval HPI: febrile this am    PAST MEDICAL & SURGICAL HISTORY:  Diabetes mellitus type 2 in nonobese  BPH (benign prostatic hyperplasia)  Hyperlipidemia  HTN (hypertension)  H/O arthroscopy: R knee      REVIEW OF SYSTEMS:  as per hpi       VITALS:  Vital Signs Last 24 Hrs  T(C): 36.7 (16 Dec 2019 12:07), Max: 38.2 (16 Dec 2019 09:45)  T(F): 98 (16 Dec 2019 12:07), Max: 100.8 (16 Dec 2019 09:45)  HR: 105 (16 Dec 2019 12:07) (94 - 107)  BP: 163/80 (16 Dec 2019 12:07) (131/71 - 172/98)  BP(mean): --  RR: 20 (16 Dec 2019 12:07) (16 - 26)  SpO2: 94% (16 Dec 2019 12:07) (94% - 100%)  CAPILLARY BLOOD GLUCOSE      POCT Blood Glucose.: 204 mg/dL (16 Dec 2019 12:19)  POCT Blood Glucose.: 220 mg/dL (16 Dec 2019 08:39)  POCT Blood Glucose.: 287 mg/dL (16 Dec 2019 03:18)  POCT Blood Glucose.: 238 mg/dL (15 Dec 2019 22:42)    I&O's Summary      PHYSICAL EXAM:  GENERAL: NAD, well-developed  HEAD:  +head trauma, +ecchymosis on face/neck  EYES: EOMI, PERRLA, conjunctiva and sclera clear  NECK: Supple, No JVD  CHEST/LUNG: b/l congestion/wheeze   HEART: S1, S2; No murmurs, rubs, or gallops  ABDOMEN: Soft, Nontender, Nondistended; Bowel sounds present  EXTREMITIES:  2+ Peripheral Pulses, No clubbing, cyanosis, or edema  PSYCH: Normal affect  NEUROLOGY: AAOX3; non-focal  SKIN: No rashes or lesions    Consultant(s) Notes Reviewed:  [x ] YES  [ ] NO  Care Discussed with Consultants/Other Providers [ x] YES  [ ] NO    MEDS:  MEDICATIONS  (STANDING):  albuterol/ipratropium for Nebulization 3 milliLiter(s) Nebulizer every 6 hours  buDESOnide    Inhalation Suspension 0.5 milliGRAM(s) Inhalation two times a day  cholecalciferol 2000 Unit(s) Oral daily  dextrose 5%. 1000 milliLiter(s) (50 mL/Hr) IV Continuous <Continuous>  dextrose 50% Injectable 12.5 Gram(s) IV Push once  dextrose 50% Injectable 25 Gram(s) IV Push once  dextrose 50% Injectable 25 Gram(s) IV Push once  heparin  Injectable 5000 Unit(s) SubCutaneous every 12 hours  insulin glargine Injectable (LANTUS) 40 Unit(s) SubCutaneous at bedtime  insulin lispro (HumaLOG) corrective regimen sliding scale   SubCutaneous three times a day before meals  insulin lispro (HumaLOG) corrective regimen sliding scale   SubCutaneous at bedtime  multivitamin/minerals 1 Tablet(s) Oral daily  tamsulosin 0.4 milliGRAM(s) Oral at bedtime    MEDICATIONS  (PRN):  artificial tears (preservative free) Ophthalmic Solution 1 Drop(s) Both EYES four times a day PRN Dry Eyes  dextrose 40% Gel 15 Gram(s) Oral once PRN Blood Glucose LESS THAN 70 milliGRAM(s)/deciliter  glucagon  Injectable 1 milliGRAM(s) IntraMuscular once PRN Glucose LESS THAN 70 milligrams/deciliter    ALLERGIES:  No Known Allergies      LABS:                        11.7   5.62  )-----------( 180      ( 16 Dec 2019 08:14 )             35.7     12-16    143  |  103  |  27<H>  ----------------------------<  269<H>  3.6   |  19<L>  |  1.17    Ca    8.4      16 Dec 2019 06:48  Mg     1.9         TPro  6.4  /  Alb  3.5  /  TBili  0.4  /  DBili  x   /  AST  40  /  ALT  31  /  AlkPhos  58  12-15    PT/INR - ( 15 Dec 2019 15:36 )   PT: 13.7 sec;   INR: 1.20 ratio         PTT - ( 15 Dec 2019 15:36 )  PTT:22.0 sec  CARDIAC MARKERS ( 15 Dec 2019 20:49 )  x     / x     / 658 U/L / x     / x      CARDIAC MARKERS ( 15 Dec 2019 15:36 )  x     / x     / 800 U/L / x     / x          LIVER FUNCTIONS - ( 15 Dec 2019 15:36 )  Alb: 3.5 g/dL / Pro: 6.4 g/dL / ALK PHOS: 58 U/L / ALT: 31 U/L / AST: 40 U/L / GGT: x           Urinalysis Basic - ( 15 Dec 2019 18:05 )    Color: Yellow / Appearance: Clear / S.029 / pH: x  Gluc: x / Ketone: Small  / Bili: Negative / Urobili: Negative   Blood: x / Protein: 30 mg/dL / Nitrite: Negative   Leuk Esterase: Negative / RBC: 4 /hpf / WBC 3 /HPF   Sq Epi: x / Non Sq Epi: 2 /hpf / Bacteria: Negative       < from: CT Thoracic Spine Reform No Cont (12.15.19 @ 17:20) >    IMPRESSION:   CT BRAIN: No acute intracranial bleeding, mass effect, or shift.   Resolving right frontal scalp hematoma, now small compared to prior CT   head from 2019.     CT CERVICAL SPINE: No acute fracture subluxation. Multilevel   spondylosis..     CT THORACIC SPINE: No acute fracture subluxation. Multilevel spondylosis.   Increased density along the left posterior lateral aspect of the central   canal at the T6-7 level possibly representing a meningioma. Follow-up   thoracic spine MRI is recommended.  Partially imaged abdomen demonstrates right renal hypodensity and nodular   thickening of the bilateral adrenal glands better evaluated on same day   CT abdomen pelvis.     CT LUMBAR SPINE: No acute fracture subluxation. Multilevel spondylosis.    < end of copied text >

## 2019-12-16 NOTE — CONSULT NOTE ADULT - ASSESSMENT
84 year-old gentleman with multiple cardiovascular risk factors as above presents with falls and weakness of unclear etiology.  Airways sound tight, but patient is not grossly volume overloaded.  No evidence of recent ACS or decompensated heart failure.    Appreciate ID and pulmonary input.    PT evaluation for discharge planning.

## 2019-12-16 NOTE — PATIENT PROFILE ADULT - JOB HELP
Patient started pravastatin again today at 1pm and the sweating started at 3pm. She was on this in the past, but stopped it. She is having no other side effects or symptoms.    no

## 2019-12-16 NOTE — PROGRESS NOTE ADULT - ASSESSMENT
83 yo male PMhx DM on insulin, HTN, BPH, HLD p/w generalized weakness and inability to ambulate after a fall, congestion with GRANT     congestion/wheezing  f/u viral swab  f/u repeat cxr  nebs  pulm consulted    fevers  id consulted  check blood and urine cult  abx as per ID

## 2019-12-16 NOTE — CONSULT NOTE ADULT - SUBJECTIVE AND OBJECTIVE BOX
Patient is a 84y old  Male who presents with a chief complaint of s/p fall (16 Dec 2019 13:19)      HPI:    85 yo male PMhx DM on insulin, HTN, BPH, HLD presents to the ED with unwitnessed fall the day prior with inability to get up and ambulate. Pt reports he slipped off the bed the day prior onto floor and was unable to get up.  Wife attempted to help him up but was unable, so placed pillow under head and pt slept on floor overnight. This AM pt was still unable to get up so EMS was called. Patient states he uses a cane to ambulate outside the house, but ambulate independently.  Patient had a trip and fall outside the house in [facility], resulting in bruising and hematoma R chin, abd, and R frontal scalp, evaluated here in ED, and discharged home.  Patient has been feeling congested for the past couple days, decrease PO intake, and GRANT.  Patient denies fever (but on Ibuprofen 800 3x daily for back and leg pain), chest pain, cough, LOC, dysuria or abd pain.  No sick contact.  c/o gen weakness and inability to ambulate (15 Dec 2019 22:05)    ER vitals:  T 98.2, P 104, /98.  Tmax 100.8 (R).  No leukocytosis.  No acute fx on CT imaging.  CT c/a/p no visceral injuries, no consolidation, no infectious focus identified.  UA (-) nit/LE.  RVP (-) x 2.      ID consult called for evaluation of fever.       REVIEW OF SYSTEMS:    CONSTITUTIONAL: No fever, weight loss, or fatigue  EYES: No eye pain, visual disturbances, or discharge  ENMT:  No sore throat  NECK: No pain or stiffness  RESPIRATORY: No cough, wheezing, chills or hemoptysis; No shortness of breath  CARDIOVASCULAR: No chest pain, palpitations, dizziness, or leg swelling  GASTROINTESTINAL: No abdominal or epigastric pain. No nausea, vomiting, or hematemesis; No diarrhea or constipation. No melena or hematochezia.  GENITOURINARY: No dysuria, frequency, hematuria, or incontinence  NEUROLOGICAL: No headaches, memory loss, loss of strength, numbness, or tremors  SKIN: No itching, burning, rashes, or lesions   LYMPH NODES: No enlarged glands  MUSCULOSKELETAL: No joint pain or swelling; No muscle, back, or extremity pain      PAST MEDICAL & SURGICAL HISTORY:  Diabetes mellitus type 2 in nonobese  BPH (benign prostatic hyperplasia)  Hyperlipidemia  HTN (hypertension)  H/O arthroscopy: R knee      Allergies    No Known Allergies    Intolerances        FAMILY HISTORY:  FH: type 2 diabetes: sister      SOCIAL HISTORY:    Lives with wife, retired, 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 1000 milliLiter(s) (50 mL/Hr) IV Continuous <Continuous>  dextrose 50% Injectable 12.5 Gram(s) IV Push once  dextrose 50% Injectable 25 Gram(s) IV Push once  dextrose 50% Injectable 25 Gram(s) IV Push once  fluticasone propionate 50 MICROgram(s)/spray Nasal Spray 1 Spray(s) Both Nostrils two times a day  heparin  Injectable 5000 Unit(s) SubCutaneous every 12 hours  insulin glargine Injectable (LANTUS) 40 Unit(s) SubCutaneous at bedtime  insulin lispro (HumaLOG) corrective regimen sliding scale   SubCutaneous three times a day before meals  insulin lispro (HumaLOG) corrective regimen sliding scale   SubCutaneous at bedtime  loratadine 10 milliGRAM(s) Oral daily  multivitamin/minerals 1 Tablet(s) Oral daily  oxymetazoline 0.05% Nasal Spray 1 Spray(s) Both Nostrils two times a day  predniSONE   Tablet 50 milliGRAM(s) Oral daily  pseudoephedrine 30 milliGRAM(s) Oral two times a day  tamsulosin 0.4 milliGRAM(s) Oral at bedtime    MEDICATIONS  (PRN):  artificial tears (preservative free) Ophthalmic Solution 1 Drop(s) Both EYES four times a day PRN Dry Eyes  dextrose 40% Gel 15 Gram(s) Oral once PRN Blood Glucose LESS THAN 70 milliGRAM(s)/deciliter  glucagon  Injectable 1 milliGRAM(s) IntraMuscular once PRN Glucose LESS THAN 70 milligrams/deciliter      Vital Signs Last 24 Hrs  T(C): 36.7 (16 Dec 2019 12:07), Max: 38.2 (16 Dec 2019 09:45)  T(F): 98 (16 Dec 2019 12:07), Max: 100.8 (16 Dec 2019 09:45)  HR: 105 (16 Dec 2019 12:07) (98 - 107)  BP: 163/80 (16 Dec 2019 12:07) (151/89 - 172/98)  BP(mean): --  RR: 20 (16 Dec 2019 12:07) (20 - 26)  SpO2: 94% (16 Dec 2019 12:07) (94% - 100%)    PHYSICAL EXAM:    GENERAL: NAD, well-groomed  HEAD:  Atraumatic, Normocephalic  EYES: EOMI, PERRLA, conjunctiva and sclera clear  ENMT: No tonsillar erythema, exudates, or enlargement; Moist mucous membranes  NECK: Supple, No JVD  CHEST/LUNG: Clear to percussion bilaterally; No rales, rhonchi, wheezing, or rubs  HEART: Regular rate and rhythm; No murmurs, rubs, or gallops  ABDOMEN: Soft, Nontender, Nondistended; Bowel sounds present  EXTREMITIES:  2+ Peripheral Pulses, No clubbing, cyanosis, or edema  LYMPH: No lymphadenopathy noted  SKIN: No rashes or lesions    LABS:  CBC Full  -  ( 16 Dec 2019 08:14 )  WBC Count : 5.62 K/uL  RBC Count : 3.86 M/uL  Hemoglobin : 11.7 g/dL  Hematocrit : 35.7 %  Platelet Count - Automated : 180 K/uL  Mean Cell Volume : 92.5 fl  Mean Cell Hemoglobin : 30.3 pg  Mean Cell Hemoglobin Concentration : 32.8 gm/dL  Auto Neutrophil # : x  Auto Lymphocyte # : x  Auto Monocyte # : x  Auto Eosinophil # : x  Auto Basophil # : x  Auto Neutrophil % : x  Auto Lymphocyte % : x  Auto Monocyte % : x  Auto Eosinophil % : x  Auto Basophil % : x          143  |  103  |  27<H>  ----------------------------<  269<H>  3.6   |  19<L>  |  1.17    Ca    8.4      16 Dec 2019 06:48  Mg     1.9         TPro  6.4  /  Alb  3.5  /  TBili  0.4  /  DBili  x   /  AST  40  /  ALT  31  /  AlkPhos  58  12-15      LIVER FUNCTIONS - ( 15 Dec 2019 15:36 )  Alb: 3.5 g/dL / Pro: 6.4 g/dL / ALK PHOS: 58 U/L / ALT: 31 U/L / AST: 40 U/L / GGT: x                               MICROBIOLOGY:        Urinalysis Basic - ( 15 Dec 2019 18:05 )    Color: Yellow / Appearance: Clear / S.029 / pH: x  Gluc: x / Ketone: Small  / Bili: Negative / Urobili: Negative   Blood: x / Protein: 30 mg/dL / Nitrite: Negative   Leuk Esterase: Negative / RBC: 4 /hpf / WBC 3 /HPF   Sq Epi: x / Non Sq Epi: 2 /hpf / Bacteria: Negative      Rapid Respiratory Viral Panel (12.16.19 @ 10:27)    Rapid RVP Result: NotDetec: This Respiratory Panel uses polymerase chain reaction (PCR) to detect for  adenovirus; coronavirus (HKU1, NL63, 229E, OC43); human metapneumovirus  (hMPV); human enterovirus/rhinovirus (Entero/RV); influenza A; influenza  A/H1; influenza A/H3; influenza A/H1-2009; influenza B; parainfluenza  viruses 1, 2, 3, 4; respiratory syncytial virus; Mycoplasma pneumoniae;  and Chlamydophila pneumoniae.    Adenovirus (RapRVP): NotDetec    Influenza A (RapRVP): NotDetec    Influenza AH1 2009 (RapRVP): NotDetec    Influenza AH1 (RapRVP): NotDetec    Influenza AH3 (RapRVP): NotDetec    Influenza B (RapRVP): NotDetec    Parainfluenza 1 (RapRVP): NotDetec    Parainfluenza 2 (RapRVP): NotDetec    Parainfluenza 3 (RapRVP): NotDetec    Parainfluenza 4 (RapRVP): NotDetec    Resp Syncytial Virus (RapRVP): NotDetec    Chlamydia pneumoniae (RapRVP): NotDetec    Mycoplasma pneumoniae (RapRVP): NotDetec    Entero/Rhinovirus (RapRVP): NotDetec    hMPV (RapRVP): NotDetec    Coronavirus (229E,HKU1,NL63,OC43): [facility]      Rapid Respiratory Viral Panel (12.15.19 @ 22:01)    Rapid RVP Result: [facility]: This Respiratory Panel uses polymerase chain reaction (PCR) to detect for  adenovirus; coronavirus (HKU1, NL63, 229E, OC43); human metapneumovirus  (hMPV); human enterovirus/rhinovirus (Entero/RV); influenza A; influenza  A/H1; influenza A/H3; influenza A/H1-2009; influenza B; parainfluenza  viruses 1, 2, 3, 4; respiratory syncytial virus; Mycoplasma pneumoniae;  and Chlamydophila pneumoniae.              RADIOLOGY:      < from: CT Thoracic Spine Reform No Cont (12.15.19 @ 17:20) >    IMPRESSION:   CT BRAIN: No acute intracranial bleeding, mass effect, or shift.   Resolving right frontal scalp hematoma, now small compared to prior CT   head from 2019.     CT CERVICAL SPINE: No acute fracture subluxation. Multilevel   spondylosis..     CT THORACIC SPINE: No acute fracture subluxation. Multilevel spondylosis.   Increased density along the left posterior lateral aspect of the central   canal at the T6-7 level possibly representing a meningioma. Follow-up   thoracic spine MRI is recommended.  Partially imaged abdomen demonstrates right renal hypodensity and nodular   thickening of the bilateral adrenal glands better evaluated on same day   CT abdomen pelvis.     CT LUMBAR SPINE: No acute fracture subluxation. Multilevel spondylosis.    < end of copied text >        < from: CT Abdomen and Pelvis w/ IV Cont (12.15.19 @ 17:20) >    IMPRESSION:   Trace amount of free fluid in the right paracolic gutter.    No visceral injury is identified.    Ventral abdominal wall subcutaneous stranding and skin thickening, left   greater the right, likely related to traumatic contusion. No discrete   hematoma.     Complex exophytic right renal cystic lesion and indeterminate left   adrenal nodule. Further characterization with nonemergent contrast   enhanced MRI is recommended.    < end of copied text >          < from: CT Chest w/ IV Cont (12.15.19 @ 17:19) >  FINDINGS:    CHEST:     LUNGS AND LARGE AIRWAYS: Patent central airways. No consolidation.  PLEURA: No pleural effusion. No pneumothorax.  VESSELS: Atherosclerotic changes of the aorta and coronary arteries.  HEART: Heart size is normal. No pericardial effusion. Mitral annulus and   aortic valve calcifications.  MEDIASTINUM AND KARLA: No lymphadenopathy.  CHEST WALL AND LOWER NECK: Within normal limits.    < end of copied text > Patient is a 84y old  Male who presents with a chief complaint of s/p fall (16 Dec 2019 13:19)      HPI:    85 yo male PMhx DM on insulin, HTN, BPH, HLD presents to the ED with unwitnessed fall the day prior with inability to get up and ambulate. Pt reports he slipped off the bed the day prior onto floor and was unable to get up.  Wife attempted to help him up but was unable, so placed pillow under head and pt slept on floor overnight. This AM pt was still unable to get up so EMS was called. Patient states he uses a cane to ambulate outside the house, but ambulate independently.  Patient had a trip and fall outside the house in [facility], resulting in bruising and hematoma R chin, abd, and R frontal scalp, evaluated here in ED, and discharged home.  Patient has been feeling congested for the past couple days, decrease PO intake, and GRANT.  Patient denies fever (but on Ibuprofen 800 3x daily for back and leg pain), chest pain, cough, LOC, dysuria or abd pain.  No sick contact.  c/o gen weakness and inability to ambulate (15 Dec 2019 22:05)    ER vitals:  T 98.2, P 104, /98.  Tmax 100.8 (R).  No leukocytosis.  No acute fx on CT imaging.  CT c/a/p no visceral injuries, no consolidation, no infectious focus identified.  UA (-) nit/LE.  RVP (-) x 2.      Pt seen by Pulm for c/o  rhinorrhea, post-nasal drip, nasal congestion.  Started on treatment for URI with bronchospasm although RVP (-).   -> URI although RVP is negative.  Pt started on Prednisone 50mg Qdaily and augmentin 500mg PO bid.     ID consult called for evaluation of fever.       REVIEW OF SYSTEMS:    CONSTITUTIONAL: No fever, weight loss, or fatigue  EYES: No eye pain, visual disturbances, or discharge  ENMT:  No sore throat  NECK: No pain or stiffness  RESPIRATORY: No cough, wheezing, chills or hemoptysis; No shortness of breath  CARDIOVASCULAR: No chest pain, palpitations, dizziness, or leg swelling  GASTROINTESTINAL: No abdominal or epigastric pain. No nausea, vomiting, or hematemesis; No diarrhea or constipation. No melena or hematochezia.  GENITOURINARY: No dysuria, frequency, hematuria, or incontinence  NEUROLOGICAL: No headaches, memory loss, loss of strength, numbness, or tremors  SKIN: No itching, burning, rashes, or lesions   LYMPH NODES: No enlarged glands  MUSCULOSKELETAL: No joint pain or swelling; No muscle, back, or extremity pain      PAST MEDICAL & SURGICAL HISTORY:  Diabetes mellitus type 2 in nonobese  BPH (benign prostatic hyperplasia)  Hyperlipidemia  HTN (hypertension)  H/O arthroscopy: R knee      Allergies    No Known Allergies    Intolerances        FAMILY HISTORY:  FH: type 2 diabetes: sister      SOCIAL HISTORY:    Lives with wife, retired, 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 1000 milliLiter(s) (50 mL/Hr) IV Continuous <Continuous>  dextrose 50% Injectable 12.5 Gram(s) IV Push once  dextrose 50% Injectable 25 Gram(s) IV Push once  dextrose 50% Injectable 25 Gram(s) IV Push once  fluticasone propionate 50 MICROgram(s)/spray Nasal Spray 1 Spray(s) Both Nostrils two times a day  heparin  Injectable 5000 Unit(s) SubCutaneous every 12 hours  insulin glargine Injectable (LANTUS) 40 Unit(s) SubCutaneous at bedtime  insulin lispro (HumaLOG) corrective regimen sliding scale   SubCutaneous three times a day before meals  insulin lispro (HumaLOG) corrective regimen sliding scale   SubCutaneous at bedtime  loratadine 10 milliGRAM(s) Oral daily  multivitamin/minerals 1 Tablet(s) Oral daily  oxymetazoline 0.05% Nasal Spray 1 Spray(s) Both Nostrils two times a day  predniSONE   Tablet 50 milliGRAM(s) Oral daily  pseudoephedrine 30 milliGRAM(s) Oral two times a day  tamsulosin 0.4 milliGRAM(s) Oral at bedtime    MEDICATIONS  (PRN):  artificial tears (preservative free) Ophthalmic Solution 1 Drop(s) Both EYES four times a day PRN Dry Eyes  dextrose 40% Gel 15 Gram(s) Oral once PRN Blood Glucose LESS THAN 70 milliGRAM(s)/deciliter  glucagon  Injectable 1 milliGRAM(s) IntraMuscular once PRN Glucose LESS THAN 70 milligrams/deciliter      Vital Signs Last 24 Hrs  T(C): 36.7 (16 Dec 2019 12:07), Max: 38.2 (16 Dec 2019 09:45)  T(F): 98 (16 Dec 2019 12:07), Max: 100.8 (16 Dec 2019 09:45)  HR: 105 (16 Dec 2019 12:07) (98 - 107)  BP: 163/80 (16 Dec 2019 12:07) (151/89 - 172/98)  BP(mean): --  RR: 20 (16 Dec 2019 12:07) (20 - 26)  SpO2: 94% (16 Dec 2019 12:07) (94% - 100%)    PHYSICAL EXAM:    GENERAL: NAD, well-groomed  HEAD:  Atraumatic, Normocephalic  EYES: EOMI, PERRLA, conjunctiva and sclera clear, mild ecymosis below b/l eyes  ENMT: No tonsillar erythema, exudates, or enlargement; Moist mucous membranes  NECK: Supple, No JVD  CHEST/LUNG: Clear to percussion bilaterally; No rales, rhonchi, wheezing, or rubs  HEART: Regular rate and rhythm; No murmurs, rubs, or gallops  ABDOMEN: Soft, Nontender, Nondistended; Bowel sounds present  EXTREMITIES:  2+ Peripheral Pulses, No clubbing, cyanosis, or edema  LYMPH: No lymphadenopathy noted  SKIN: No rashes or lesions    LABS:  CBC Full  -  ( 16 Dec 2019 08:14 )  WBC Count : 5.62 K/uL  RBC Count : 3.86 M/uL  Hemoglobin : 11.7 g/dL  Hematocrit : 35.7 %  Platelet Count - Automated : 180 K/uL  Mean Cell Volume : 92.5 fl  Mean Cell Hemoglobin : 30.3 pg  Mean Cell Hemoglobin Concentration : 32.8 gm/dL  Auto Neutrophil # : x  Auto Lymphocyte # : x  Auto Monocyte # : x  Auto Eosinophil # : x  Auto Basophil # : x  Auto Neutrophil % : x  Auto Lymphocyte % : x  Auto Monocyte % : x  Auto Eosinophil % : x  Auto Basophil % : x      12-16    143  |  103  |  27<H>  ----------------------------<  269<H>  3.6   |  19<L>  |  1.17    Ca    8.4      16 Dec 2019 06:48  Mg     1.9     -    TPro  6.4  /  Alb  3.5  /  TBili  0.4  /  DBili  x   /  AST  40  /  ALT  31  /  AlkPhos  58  12-15      LIVER FUNCTIONS - ( 15 Dec 2019 15:36 )  Alb: 3.5 g/dL / Pro: 6.4 g/dL / ALK PHOS: 58 U/L / ALT: 31 U/L / AST: 40 U/L / GGT: x                               MICROBIOLOGY:        Urinalysis Basic - ( 15 Dec 2019 18:05 )    Color: Yellow / Appearance: Clear / S.029 / pH: x  Gluc: x / Ketone: Small  / Bili: Negative / Urobili: Negative   Blood: x / Protein: 30 mg/dL / Nitrite: Negative   Leuk Esterase: Negative / RBC: 4 /hpf / WBC 3 /HPF   Sq Epi: x / Non Sq Epi: 2 /hpf / Bacteria: Negative      Rapid Respiratory Viral Panel (19 @ 10:27)    Rapid RVP Result: NotDetec: This Respiratory Panel uses polymerase chain reaction (PCR) to detect for  adenovirus; coronavirus (HKU1, NL63, 229E, OC43); human metapneumovirus  (hMPV); human enterovirus/rhinovirus (Entero/RV); influenza A; influenza  A/H1; influenza A/H3; influenza A/H1-2009; influenza B; parainfluenza  viruses 1, 2, 3, 4; respiratory syncytial virus; Mycoplasma pneumoniae;  and Chlamydophila pneumoniae.    Adenovirus (RapRVP): NotDetec    Influenza A (RapRVP): NotDetec    Influenza AH1 2009 (RapRVP): NotDetec    Influenza AH1 (RapRVP): NotDetec    Influenza AH3 (RapRVP): NotDetec    Influenza B (RapRVP): NotDetec    Parainfluenza 1 (RapRVP): NotDetec    Parainfluenza 2 (RapRVP): NotDetec    Parainfluenza 3 (RapRVP): NotDetec    Parainfluenza 4 (RapRVP): NotDetec    Resp Syncytial Virus (RapRVP): NotDetec    Chlamydia pneumoniae (RapRVP): NotDetec    Mycoplasma pneumoniae (RapRVP): NotDetec    Entero/Rhinovirus (RapRVP): NotDetec    hMPV (RapRVP): NotDetec    Coronavirus (229E,HKU1,NL63,OC43): NotDete      Rapid Respiratory Viral Panel (12.15.19 @ 22:01)    Rapid RVP Result: NotDete: This Respiratory Panel uses polymerase chain reaction (PCR) to detect for  adenovirus; coronavirus (HKU1, NL63, 229E, OC43); human metapneumovirus  (hMPV); human enterovirus/rhinovirus (Entero/RV); influenza A; influenza  A/H1; influenza A/H3; influenza A/H1-2009; influenza B; parainfluenza  viruses 1, 2, 3, 4; respiratory syncytial virus; Mycoplasma pneumoniae;  and Chlamydophila pneumoniae.              RADIOLOGY:      < from: CT Thoracic Spine Reform No Cont (12.15.19 @ 17:20) >    IMPRESSION:   CT BRAIN: No acute intracranial bleeding, mass effect, or shift.   Resolving right frontal scalp hematoma, now small compared to prior CT   head from 2019.     CT CERVICAL SPINE: No acute fracture subluxation. Multilevel   spondylosis..     CT THORACIC SPINE: No acute fracture subluxation. Multilevel spondylosis.   Increased density along the left posterior lateral aspect of the central   canal at the T6-7 level possibly representing a meningioma. Follow-up   thoracic spine MRI is recommended.  Partially imaged abdomen demonstrates right renal hypodensity and nodular   thickening of the bilateral adrenal glands better evaluated on same day   CT abdomen pelvis.     CT LUMBAR SPINE: No acute fracture subluxation. Multilevel spondylosis.    < end of copied text >        < from: CT Abdomen and Pelvis w/ IV Cont (12.15.19 @ 17:20) >    IMPRESSION:   Trace amount of free fluid in the right paracolic gutter.    No visceral injury is identified.    Ventral abdominal wall subcutaneous stranding and skin thickening, left   greater the right, likely related to traumatic contusion. No discrete   hematoma.     Complex exophytic right renal cystic lesion and indeterminate left   adrenal nodule. Further characterization with nonemergent contrast   enhanced MRI is recommended.    < end of copied text >          < from: CT Chest w/ IV Cont (12.15.19 @ 17:19) >  FINDINGS:    CHEST:     LUNGS AND LARGE AIRWAYS: Patent central airways. No consolidation.  PLEURA: No pleural effusion. No pneumothorax.  VESSELS: Atherosclerotic changes of the aorta and coronary arteries.  HEART: Heart size is normal. No pericardial effusion. Mitral annulus and   aortic valve calcifications.  MEDIASTINUM AND KARLA: No lymphadenopathy.  CHEST WALL AND LOWER NECK: Within normal limits.    < end of copied text >

## 2019-12-16 NOTE — PHYSICAL THERAPY INITIAL EVALUATION ADULT - PERTINENT HX OF CURRENT PROBLEM, REHAB EVAL
Pt is a 85 y/o male admitted to [facility] on 12/15/19 PMhx DM on insulin, HTN, BPH, HLD presents to the ED with unwitnessed fall the day prior with inability to get up and ambulate. Pt reports he slipped off the bed the day prior onto floor and was unable to get up.  Wife attempted to help him up but was unable, so placed pillow under head and pt slept on floor overnight. This AM pt was still unable to get up so EMS was called.

## 2019-12-16 NOTE — PHYSICAL THERAPY INITIAL EVALUATION ADULT - PRECAUTIONS/LIMITATIONS, REHAB EVAL
fall precautions/Patient states he uses a cane to ambulate outside the house, but ambulate independently.  Patient [place] and [place], resulting in bruising and hematoma R chin, abd, and R frontal scalp, evaluated here in ED, and discharged home.  Patient has been feeling congested for the past couple days, decrease PO intake, and GRANT.  Patient denies fever (but on Ibuprofen 800 3x daily for back and leg pain), chest pain, cough, LOC, dysuria or abd pain.  No sick contact.  c/o gen weakness and inability to ambulate. All imaging neg for acute fx. CT chest: Trace amount of free fluid in the right paracolic gutter. No visceral injury is identified. Ventral abdominal wall subcutaneous stranding and skin thickening, left greater the right, likely related to traumatic contusion. No discrete hematoma.

## 2019-12-16 NOTE — CONSULT NOTE ADULT - ASSESSMENT
ASSESSMENT:    rhinorrhea, post-nasal drip, nasal congestion -> URI although RVP is negative    bronchospasm    likely obstructive sleep apnea    recurrent falls with mild elevation in CPK    HTN/HLD/DM    PLAN/RECOMMENDATIONS:    stable oxygenation on room air  augmentin 500mg 2 times daily  prednisone 50mg daily - glucose control on steroids  albuterol/atrovent/pulmicort nebs  flonase/Afrin/claritin/sudafed  DVT prophylaxis  physical therapy evaluation  outpatient balance training  flomax  cardiology evaluation     Thank you for the courtesy of this referral. Plan of care discussed with the patient and his family at bedside.    Ignacio Rollins MD, [facility]  [phone]  Pulmonary Medicine.

## 2019-12-16 NOTE — PROGRESS NOTE ADULT - PROBLEM SELECTOR PLAN 3
pulm f/u  f/u repeat cxr   - will treat with Duonebs, inhaled steroid, and O2 supplementation  - BIPAP if worsen or to decrease WOB

## 2019-12-16 NOTE — CONSULT NOTE ADULT - ASSESSMENT
83 yo male PMhx DM on insulin, HTN, BPH, HLD presents to the ED with unwitnessed fall the day prior with inability to get up and ambulate. Pt reports he slipped off the bed the day prior onto floor and was unable to get up.  Wife attempted to help him up but was unable, so placed pillow under head and pt slept on floor overnight. This AM pt was still unable to get up so EMS was called. Patient states he uses a cane to ambulate outside the house, but ambulate independently.  Patient had a trip and fall outside the house in [facility], resulting in bruising and hematoma R chin, abd, and R frontal scalp, evaluated here in ED, and discharged home.  Patient has been feeling congested for the past couple days, decrease PO intake, and GRANT.  Patient denies fever (but on Ibuprofen 800 3x daily for back and leg pain), chest pain, cough, LOC, dysuria or abd pain.  No sick contact.  c/o gen weakness and inability to ambulate (15 Dec 2019 22:05)    ER vitals:  T 98.2, P 104, /98.  Tmax 100.8 (R).  No leukocytosis.  No acute fx on CT imaging.  CT c/a/p no visceral injuries, no consolidation, no infectious focus identified.  UA (-) nit/LE.  RVP (-) x 2.      Pt seen by Pulm for c/o  rhinorrhea, post-nasal drip, nasal congestion.  Started on treatment for URI with bronchospasm although RVP (-).   -> URI although RVP is negative.  Pt started on Prednisone 50mg Qdaily and augmentin 500mg PO bid.     ID consult called for evaluation of fever.     Fever:    - Possible URI given congestion with post-nasal drip, pt with wheezing.  Started on steroids and empiric abx.  No consolidations on CT imaging.      - Cont to monitor temp curve and wbc.  f/u blood cultures, UA, ucx.  RVP (-) x 2      Will follow,    Zenia Begr  [phone]

## 2019-12-16 NOTE — CONSULT NOTE ADULT - SUBJECTIVE AND OBJECTIVE BOX
Patient seen and evaluated @ 1400 on 4 DSU  Chief Complaint: Falls    HPI:  83 yo male PMhx DM on insulin, HTN, BPH, HLD presents to the ED with unwitnessed fall the day prior with inability to get up and ambulate. Pt reports he slipped off the bed the day prior onto floor and was unable to get up.  Wife attempted to help him up but was unable, so placed pillow under head and pt slept on floor overnight. This AM pt was still unable to get up so EMS was called. Patient states he uses a cane to ambulate outside the house, but ambulate independently.  Patient had a trip and fall outside the house in [facility], resulting in bruising and hematoma R chin, abd, and R frontal scalp, evaluated here in ED, and discharged home.  Patient has been feeling congested for the past couple days, decrease PO intake, and GRANT.  Patient denies fever (but on Ibuprofen 800 3x daily for back and leg pain), chest pain, cough, LOC, dysuria or abd pain.  No sick contact.  c/o gen weakness and inability to ambulate (15 Dec 2019 22:05)    PMH:   Diabetes mellitus type 2 in nonobese  BPH (benign prostatic hyperplasia)  Hyperlipidemia  HTN (hypertension)    PSH:   H/O arthroscopy  No significant past surgical history    Medications:   albuterol/ipratropium for Nebulization 3 milliLiter(s) Nebulizer every 6 hours  amoxicillin  500 milliGRAM(s)/clavulanate 1 Tablet(s) Oral two times a day  artificial tears (preservative free) Ophthalmic Solution 1 Drop(s) Both EYES four times a day PRN  buDESOnide    Inhalation Suspension 0.5 milliGRAM(s) Inhalation two times a day  cholecalciferol 2000 Unit(s) Oral daily  dextrose 40% Gel 15 Gram(s) Oral once PRN  dextrose 5%. 1000 milliLiter(s) IV Continuous <Continuous>  dextrose 50% Injectable 12.5 Gram(s) IV Push once  dextrose 50% Injectable 25 Gram(s) IV Push once  dextrose 50% Injectable 25 Gram(s) IV Push once  fluticasone propionate 50 MICROgram(s)/spray Nasal Spray 1 Spray(s) Both Nostrils two times a day  glucagon  Injectable 1 milliGRAM(s) IntraMuscular once PRN  heparin  Injectable 5000 Unit(s) SubCutaneous every 12 hours  insulin glargine Injectable (LANTUS) 40 Unit(s) SubCutaneous at bedtime  insulin lispro (HumaLOG) corrective regimen sliding scale   SubCutaneous three times a day before meals  insulin lispro (HumaLOG) corrective regimen sliding scale   SubCutaneous at bedtime  loratadine 10 milliGRAM(s) Oral daily  multivitamin/minerals 1 Tablet(s) Oral daily  oxymetazoline 0.05% Nasal Spray 1 Spray(s) Both Nostrils two times a day  predniSONE   Tablet 50 milliGRAM(s) Oral daily  pseudoephedrine 30 milliGRAM(s) Oral two times a day  tamsulosin 0.4 milliGRAM(s) Oral at bedtime    Allergies:  No Known Allergies    FAMILY HISTORY:  FH: type 2 diabetes: sister    Social History: , lives with wife  Smoking:   Alcohol:  Drugs:    Review of Systems:  [ ]Unable to obtain  Constitutional: No fever, chills, fatigue, or changes in weight  HEENT: No blurry vision, eye pain, headache, runny nose, or sore throat  Respiratory: No shortness of breath, cough, or wheezing  Cardiovascular: No chest pain or palpitations  Gastrointestinal: No nausea, vomiting, diarrhea, or abdominal pain  Genitourinary: No dysuria or incontinence  Extremities: No lower extremity swelling  Neurologic: No focal findings  Lymphatic: No lymphadenopathy   Skin: No rash  Psychiatry: No anxiety or depression  10 point review of systems is otherwise negative except as mentioned above            Physical Exam:  T(C): 37.2 (12-16-19 @ 21:21), Max: 38.2 (12-16-19 @ 09:45)  HR: 111 (12-16-19 @ 21:21) (100 - 118)  BP: 155/79 (12-16-19 @ 21:21) (150/75 - 164/82)  RR: 20 (12-16-19 @ 21:21) (20 - 22)  SpO2: 96% (12-16-19 @ 21:21) (94% - 97%)  Wt(kg): --    12-16 @ 07:01  -  12-17 @ 00:44  --------------------------------------------------------  IN: 340 mL / OUT: 650 mL / NET: -310 mL      Daily Height in cm: 175.26 (16 Dec 2019 05:43)    Daily     Appearance: Normal, well groomed, NAD  Eyes: PERRLA, EOMI, pink conjunctiva, no scleral icterus   HENT: Normal oral mucosa; old ecchymoses  Cardiovascular: RRR, S1, S2, no murmur, rub, or gallop; no edema; no JVD  Respiratory: Clear to auscultation bilaterally  Gastrointestinal: Soft, non-tender, non-distended, BS+  Musculoskeletal: No clubbing or joint deformity   Neurologic: No focal weakness  Lymphatic: No lymphadenopathy  Psychiatry: AAOx3 with appropriate mood and affect  Skin: No rashes, ecchymoses, or cyanosis    Cardiovascular Diagnostic Testing:    ECG: sinus 98 bpm, RBBB    Imaging: < from: CT Thoracic Spine Reform No Cont (12.15.19 @ 17:20) >  FINDINGS:     CT BRAIN:   There is no acute intracranial mass-effect, hemorrhage, midline shift, or   abnormal extra-axial fluid collection.   Basal cisterns are patent.   The ventricles, and sulci are prominent consistent with mild to moderate   volume loss.   Patchy hypodensities in the periventricular white matter is consistent   with mild chronic microvascular ischemic changes.  Paranasal sinuses and mastoidair cells are clear. The calvarium is   intact. Small right frontal scalp hematoma which is significantly   decreased in size compared to prior CT head from 11/28/2019.    CT CERVICAL SPINE:   Motion degraded evaluation.  Straightening of the cervical lordosis due to muscle spasm and/or   positioning.   There is no prevertebral soft tissue swelling. Vertebral body heights and   alignment are maintained.  The atlantodental and atlanto-occipital joints are maintained. Articular   facets and posterior elements alignment is maintained.   Multiple moderate multilevel degenerative changes characterized by   intervertebral disc space narrowing, endplate sclerosis, marginal   osteophyte formation and uncovertebral hypertrophy. There is resultant   moderate bilateral neural foraminal stenosis at C4-C5, C5-C6 and C6-C7.  The partially imaged lung apices are clear.     CT THORACIC SPINE:   No acute fracture or dislocation. No traumatic subluxation. Mild   multilevel degenerative changes.  At the C6-7 level, there is increased density which appears to be located   within the thecal sac possibly dural in location. This may represent a   meningioma.  Partially imaged lung fields are unremarkable. Atherosclerotic   calcifications of the aorta. Partially imaged abdomen demonstrates   partially imaged right renal hypodensity and nodular thickening of the   bilateral adrenal glands, better evaluated on same date CT abdomen   pelvis.     CT LUMBAR SPINE:  No acute fracture dislocation. No traumatic subluxation. Mild to moderate   multilevel degenerative changes most prominent at L3-L4, and L4-L5 with   vacuum disc phenomenon, intervertebral disc space narrowing, endplate   sclerosis, marginal osteophyte formation and uncovertebral hypertrophy   with resultant moderate bilateral neural foraminal stenosis.    IMPRESSION:   CT BRAIN: No acute intracranial bleeding, mass effect, or shift.   Resolving right frontal scalp hematoma, now small compared to prior CT   head from 11/28/2019.     CT CERVICAL SPINE: No acute fracture subluxation. Multilevel   spondylosis..     CT THORACIC SPINE: No acute fracture subluxation. Multilevel spondylosis.   Increased density along the left posterior lateral aspect of the central   canal at the T6-7 level possibly representing a meningioma. Follow-up   thoracic spine MRI is recommended.  Partially imaged abdomen demonstrates right renal hypodensity and nodular   thickening of the bilateral adrenal glands better evaluated on same day   CT abdomen pelvis.     CT LUMBAR SPINE: No acute fracture subluxation. Multilevel spondylosis.    ROULA BACH M.D., RADIOLOGY RESIDENT  This document has been electronically signed.  CECI DAILEY M.D., ATTENDING RADIOLOGIST  This document has been electronically signed. Dec 15 2019  5:46PM      < end of copied text >      Labs:                        11.7   5.62  )-----------( 180      ( 16 Dec 2019 08:14 )             35.7     12-16    143  |  103  |  27<H>  ----------------------------<  269<H>  3.6   |  19<L>  |  1.17    Ca    8.4      16 Dec 2019 06:48  Mg     1.9     12-16    TPro  6.4  /  Alb  3.5  /  TBili  0.4  /  DBili  x   /  AST  40  /  ALT  31  /  AlkPhos  58  12-15    PT/INR - ( 15 Dec 2019 15:36 )   PT: 13.7 sec;   INR: 1.20 ratio         PTT - ( 15 Dec 2019 15:36 )  PTT:22.0 sec  CARDIAC MARKERS ( 15 Dec 2019 20:49 )  x     / x     / 658 U/L / x     / x      CARDIAC MARKERS ( 15 Dec 2019 15:36 )  x     / x     / 800 U/L / x     / x              Hemoglobin A1C, Whole Blood: 7.9 % (12-16 @ 15:52)

## 2019-12-16 NOTE — PHYSICAL THERAPY INITIAL EVALUATION ADULT - ADDITIONAL COMMENTS
Pt lives in a private house with spouse with 5 GAVIN and 6 steps inside. Pt was Ind with all ADLs and amb with SC in the community

## 2019-12-16 NOTE — CONSULT NOTE ADULT - SUBJECTIVE AND OBJECTIVE BOX
[facility] - [facility] - CONSULT NOTE    HPI: 84 year old gentleman, lifelong non-smoker, with no known intrinsic lung disease. He has a history of HTN, HLD, DM and BPH and followed by Dr. Vladimir Mendieta. The patient fell on Thanksgiving suffering extensive head trauma but did not require hospitalization. He fell again the other night and was unable to get up from the floor where he slept overnight. He has no significant injuries. The patient describes chronic nasal congestion which is somewhat worse than usual associated with rhinorrhea and a post nasal drip. He snores loudly. He has chest congestion and wheeze without cough or sputum production. He is not short of breath sitting in the chair on room air. He has low grade fevers without chills or sweats. No chest pain/pressure or palpitations. Asked to evaluate.    PMHX:  Hypertension  Diabetes mellitus   Hyperlipidemia  BPH (benign prostatic hyperplasia)    PSHX:  Arthroscopy    FAMILY HISTORY:  sister - diabetes    SOCIAL HISTORY:  ; lives with his wife; lifelong non-smoker; retired [employer] owner;     Pulmonary Medications:   albuterol/ipratropium for Nebulization 3 milliLiter(s) Nebulizer every 6 hours  buDESOnide    Inhalation Suspension 0.5 milliGRAM(s) Inhalation two times a day  loratadine 10 milliGRAM(s) Oral daily  pseudoephedrine 30 milliGRAM(s) Oral two times a day      Antimicrobials:  amoxicillin  500 milliGRAM(s)/clavulanate 1 Tablet(s) Oral two times a day      Cardiology:  tamsulosin 0.4 milliGRAM(s) Oral at bedtime      Other:  cholecalciferol 2000 Unit(s) Oral daily  dextrose 5%. 1000 milliLiter(s) IV Continuous <Continuous>  dextrose 50% Injectable 12.5 Gram(s) IV Push once  dextrose 50% Injectable 25 Gram(s) IV Push once  dextrose 50% Injectable 25 Gram(s) IV Push once  fluticasone propionate 50 MICROgram(s)/spray Nasal Spray 1 Spray(s) Both Nostrils two times a day  heparin  Injectable 5000 Unit(s) SubCutaneous every 12 hours  insulin glargine Injectable (LANTUS) 40 Unit(s) SubCutaneous at bedtime  insulin lispro (HumaLOG) corrective regimen sliding scale   SubCutaneous three times a day before meals  insulin lispro (HumaLOG) corrective regimen sliding scale   SubCutaneous at bedtime  multivitamin/minerals 1 Tablet(s) Oral daily  oxymetazoline 0.05% Nasal Spray 1 Spray(s) Both Nostrils two times a day  predniSONE   Tablet 50 milliGRAM(s) Oral daily      Prn:  MEDICATIONS  (PRN):  artificial tears (preservative free) Ophthalmic Solution 1 Drop(s) Both EYES four times a day PRN Dry Eyes  dextrose 40% Gel 15 Gram(s) Oral once PRN Blood Glucose LESS THAN 70 milliGRAM(s)/deciliter  glucagon  Injectable 1 milliGRAM(s) IntraMuscular once PRN Glucose LESS THAN 70 milligrams/deciliter      Allergies    No Known Allergies    HOME MEDICATIONS: see  H & P    REVIEW OF SYSTEMS:  Constitutional: As per HPI  HEENT: As per HPI  CV: As per HPI  Resp: As per HPI  GI: Within normal limits   : Within normal limits  Musculoskeletal: Within normal limits  Skin: Within normal limits  Neurological: Within normal limits  Psychiatric: Within normal limits  Endocrine: Within normal limits  Hematologic/Lymphatic: Within normal limits  Allergic/Immunologic: Within normal limits    [x] All other systems negative    OBJECTIVE:    I&O's Detail    16 Dec 2019 07:01  -  16 Dec 2019 15:43  --------------------------------------------------------  IN:    Oral Fluid: 340 mL  Total IN: 340 mL    OUT:    Voided: 650 mL  Total OUT: 650 mL    Total NET: -310 mL    Daily Height in cm: 175.26 (16 Dec 2019 05:43)      POCT Blood Glucose.: 204 mg/dL (16 Dec 2019 12:19)  POCT Blood Glucose.: 220 mg/dL (16 Dec 2019 08:39)  POCT Blood Glucose.: 287 mg/dL (16 Dec 2019 03:18)  POCT Blood Glucose.: 238 mg/dL (15 Dec 2019 22:42)      PHYSICAL EXAM:  ICU Vital Signs Last 24 Hrs  T(C): 36.7 (16 Dec 2019 12:07), Max: 38.2 (16 Dec 2019 09:45)  T(F): 98 (16 Dec 2019 12:07), Max: 100.8 (16 Dec 2019 09:45)  HR: 105 (16 Dec 2019 12:07) (98 - 107)  BP: 163/80 (16 Dec 2019 12:07) (151/89 - 172/98)  BP(mean): --  ABP: --  ABP(mean): --  RR: 20 (16 Dec 2019 12:07) (20 - 26)  SpO2: 94% (16 Dec 2019 12:07) (94% - 100%) on room air    General: Awake. Alert. Cooperative. No distress. Appears stated age 	  HEENT: Atraumatic. Normocephalic. Anicteric. Normal oral mucosa. PERRL. EOMI. Marked nasal mucosal injection and erythema.  Neck: Supple. Trachea midline. Thyroid without enlargement/tenderness/nodules. No carotid bruit. No JVD. Short and wide  Cardiovascular: Regular rate and rhythm. S1 S2 normal. No murmurs, rubs or gallops.  Respiratory: Respirations unlabored. Mild bilateral wheeze. No curvature.  Abdomen: Soft. Non-tender. Non-distended. No organomegaly. No masses. Normal bowel sounds. Obese.  Extremities: Warm to touch. No clubbing or cyanosis. No pedal edema.  Pulses: 2+ peripheral pulses all extremities.	  Skin: Ecchymoses around right eye and on right chin/neck. Hematoma on the top of the right sided of the head.  Lymph Nodes: Cervical, supraclavicular and axillary nodes normal  Neurological: Motor and sensory examination equal and normal. A and O x 3  Psychiatry: Appropriate mood and affect.      LABS:                          11.7   5.62  )-----------( 180      ( 16 Dec 2019 08:14 )             35.7     CBC    WBC  5.62 <==, 4.58 <==    Hemoglobin  11.7 <<==, 12.3 <<==    Hematocrit  35.7 <==, 37.6 <==    Platelets  180 <==, 164 <==      143  |  103  |  27<H>  ----------------------------<  269<H>    1216  3.6   |  19<L>  |  1.17    LYTES    sodium  143 <==, 142 <==    potassium   3.6 <==, 4.3 <==    chloride  103 <==, 103 <==    carbon dioxide  19 <==, 22 <==    =============================================================================================  RENAL FUNCTION:    Creatinine:   1.17  <<==, 1.21  <<==    BUN:   27 <==, 32 <==    ============================================================================================    calcium   8.4 <==, 8.7 <==    phos       mag   1.9 <==, 1.5 <==    ============================================================================================  LFTs    AST:   40 <==     ALT:  31  <==     AP:  58  <=    Bili:  0.4  <=    PT/INR - ( 15 Dec 2019 15:36 )   PT: 13.7 sec;   INR: 1.20 ratio       PTT - ( 15 Dec 2019 15:36 )  PTT:22.0 sec    Venous Blood Gas:  12-15 @ 15:36  7.42/40/39/25/74  VBG Lactate: 1.9    CARDIAC MARKERS ( 15 Dec 2019 20:49 )   U/L /CKMB x     /CKMB Units x        troponin x        CARDIAC MARKERS ( 15 Dec 2019 15:36 )   U/L /CKMB x     /CKMB Units x        troponin x        MICROBIOLOGY:     Rapid Respiratory Viral Panel (12.16.19 @ 10:27)    Rapid RVP Result: NotDete: This Respiratory Panel uses polymerase chain reaction (PCR) to detect for  adenovirus; coronavirus (HKU1, NL63, 229E, OC43); human metapneumovirus  (hMPV); human enterovirus/rhinovirus (Entero/RV); influenza A; influenza  A/H1; influenza A/H3; influenza A/H1-2009; influenza B; parainfluenza  viruses 1, 2, 3, 4; respiratory syncytial virus; Mycoplasma pneumoniae;  and Chlamydophila pneumoniae.    Urinalysis Basic - ( 15 Dec 2019 18:05 )    Color: Yellow / Appearance: Clear / S.029 / pH: x  Gluc: x / Ketone: Small  / Bili: Negative / Urobili: Negative   Blood: x / Protein: 30 mg/dL / Nitrite: Negative   Leuk Esterase: Negative / RBC: 4 /hpf / WBC 3 /HPF   Sq Epi: x / Non Sq Epi: 2 /hpf / Bacteria: Negative      RADIOLOGY:  [x ] Chest radiographs reviewed and interpreted by me      EXAM:  XR CHEST AP OR PA 1V                          PROCEDURE DATE:  12/15/2019      INTERPRETATION:  CLINICAL INFORMATION: Cough and fever.    EXAM: AP chest radiograph    COMPARISON: None    FINDINGS:  The heart is normal in size. Calcified aortic knob. No focal   consolidations. No pleural effusion or pneumothorax.  The visualized osseous structures demonstrate no acute pathology.    IMPRESSION:  Clear lungs    KATHY SNOW M.D., RADIOLOGY RESIDENT  This document has been electronically signed.  CAITIE MEJIAS M.D., ATTENDING RADIOLOGIST  This document has been electronically signed. Dec 16 2019  9:38AM  ---------------------------------------------------------------------------------------------------------------    EXAM:  CT ABDOMEN AND PELVIS IC                          EXAM:  CT CHEST IC                          PROCEDURE DATE:  12/15/2019      INTERPRETATION:  CLINICAL INFORMATION: Trauma. Status post fall with   diffuse abdominal tenderness.    COMPARISON: None.    PROCEDURE:   CT of the Chest, Abdomen and Pelvis was performed with intravenous   contrast.   Imaging was performed through the chest in the arterial phase followed by   imaging of the abdomen and pelvis in the portal venous phase.  Intravenous contrast: 90 ml Omnipaque 350. 10 ml discarded.  Oral contrast: None.  Sagittal and coronal reformats were performed.    FINDINGS:    CHEST:     LUNGS AND LARGE AIRWAYS: Patent central airways. No consolidation.  PLEURA: No pleural effusion. No pneumothorax.  VESSELS: Atherosclerotic changes of the aorta and coronary arteries.  HEART: Heart size is normal. No pericardial effusion. Mitral annulus and   aortic valve calcifications.  MEDIASTINUM AND KARLA: No lymphadenopathy.  CHEST WALL AND LOWER NECK: Within normal limits.    ABDOMEN AND PELVIS:    LIVER: Steatosis.  BILE DUCTS: Normal caliber.  GALLBLADDER: Within normal limits.  SPLEEN: Within normal limits.  PANCREAS: Within normal limits.  ADRENALS: 7 mm indeterminate left adrenal nodule.. Right adrenal gland is   unremarkable..  KIDNEYS/URETERS: No renal stones or hydronephrosis. Right upper pole   exophytic complex cystic lesion with irregular thick mural thickening..   Bilateral renal subcentimeter hypodensities which are too small to   characterize. Punctate 2 mm nonobstructing calculus in the midpole of the   left kidney.    BLADDER: 5 cm left and 1 cm right posterior bladder diverticuli.    REPRODUCTIVE ORGANS: Prostate within normal limits.    BOWEL: No bowel obstruction. Appendix is normal.  PERITONEUM: Trace fluid in the right paracolic gutter. No   pneumoperitoneum.  VESSELS: Atherosclerotic changes.  RETROPERITONEUM/LYMPH NODES: No lymphadenopathy.    ABDOMINAL WALL: Small degree of ventral abdominal wall subcutaneous   stranding and skin thickening, left greater the right, likely related to   traumatic contusion. No discrete hematoma. Small bilateral fat-containing   inguinal hernias..  BONES: No acute fracture or dislocation. Degenerative changes.    IMPRESSION:   Trace amount of free fluid in the right paracolic gutter.    No visceral injury is identified.    Ventral abdominal wall subcutaneous stranding and skin thickening, left   greater the right, likely related to traumatic contusion. No discrete   hematoma.     Complex exophytic right renal cystic lesion and indeterminate left   adrenal nodule. Further characterization with nonemergent contrast   enhanced MRI is recommended.    ROULA BACH M.D., RADIOLOGY RESIDENT  This document has been electronically signed.  MARK GUNDERSON M.D., ATTENDING RADIOLOGIST  This document has been electronically signed. Dec 15 2019  6:35PM  ---------------------------------------------------------------------------------------------------------------

## 2019-12-17 LAB
ANION GAP SERPL CALC-SCNC: 18 MMOL/L — HIGH (ref 5–17)
BASOPHILS # BLD AUTO: 0.02 K/UL — SIGNIFICANT CHANGE UP (ref 0–0.2)
BASOPHILS NFR BLD AUTO: 0.3 % — SIGNIFICANT CHANGE UP (ref 0–2)
BUN SERPL-MCNC: 23 MG/DL — SIGNIFICANT CHANGE UP (ref 7–23)
CALCIUM SERPL-MCNC: 8.6 MG/DL — SIGNIFICANT CHANGE UP (ref 8.4–10.5)
CHLORIDE SERPL-SCNC: 103 MMOL/L — SIGNIFICANT CHANGE UP (ref 96–108)
CO2 SERPL-SCNC: 21 MMOL/L — LOW (ref 22–31)
CREAT SERPL-MCNC: 1.03 MG/DL — SIGNIFICANT CHANGE UP (ref 0.5–1.3)
EOSINOPHIL # BLD AUTO: 0.14 K/UL — SIGNIFICANT CHANGE UP (ref 0–0.5)
EOSINOPHIL NFR BLD AUTO: 2.4 % — SIGNIFICANT CHANGE UP (ref 0–6)
GLUCOSE BLDC GLUCOMTR-MCNC: 206 MG/DL — HIGH (ref 70–99)
GLUCOSE BLDC GLUCOMTR-MCNC: 235 MG/DL — HIGH (ref 70–99)
GLUCOSE BLDC GLUCOMTR-MCNC: 247 MG/DL — HIGH (ref 70–99)
GLUCOSE BLDC GLUCOMTR-MCNC: 270 MG/DL — HIGH (ref 70–99)
GLUCOSE BLDC GLUCOMTR-MCNC: 329 MG/DL — HIGH (ref 70–99)
GLUCOSE SERPL-MCNC: 268 MG/DL — HIGH (ref 70–99)
HCT VFR BLD CALC: 37.3 % — LOW (ref 39–50)
HGB BLD-MCNC: 12.2 G/DL — LOW (ref 13–17)
IMM GRANULOCYTES NFR BLD AUTO: 1.5 % — SIGNIFICANT CHANGE UP (ref 0–1.5)
LYMPHOCYTES # BLD AUTO: 1.09 K/UL — SIGNIFICANT CHANGE UP (ref 1–3.3)
LYMPHOCYTES # BLD AUTO: 18.7 % — SIGNIFICANT CHANGE UP (ref 13–44)
MCHC RBC-ENTMCNC: 30.2 PG — SIGNIFICANT CHANGE UP (ref 27–34)
MCHC RBC-ENTMCNC: 32.7 GM/DL — SIGNIFICANT CHANGE UP (ref 32–36)
MCV RBC AUTO: 92.3 FL — SIGNIFICANT CHANGE UP (ref 80–100)
MONOCYTES # BLD AUTO: 0.34 K/UL — SIGNIFICANT CHANGE UP (ref 0–0.9)
MONOCYTES NFR BLD AUTO: 5.8 % — SIGNIFICANT CHANGE UP (ref 2–14)
NEUTROPHILS # BLD AUTO: 4.14 K/UL — SIGNIFICANT CHANGE UP (ref 1.8–7.4)
NEUTROPHILS NFR BLD AUTO: 71.3 % — SIGNIFICANT CHANGE UP (ref 43–77)
PLATELET # BLD AUTO: 186 K/UL — SIGNIFICANT CHANGE UP (ref 150–400)
POTASSIUM SERPL-MCNC: 3.5 MMOL/L — SIGNIFICANT CHANGE UP (ref 3.5–5.3)
POTASSIUM SERPL-SCNC: 3.5 MMOL/L — SIGNIFICANT CHANGE UP (ref 3.5–5.3)
RBC # BLD: 4.04 M/UL — LOW (ref 4.2–5.8)
RBC # FLD: 12.9 % — SIGNIFICANT CHANGE UP (ref 10.3–14.5)
SODIUM SERPL-SCNC: 142 MMOL/L — SIGNIFICANT CHANGE UP (ref 135–145)
WBC # BLD: 5.82 K/UL — SIGNIFICANT CHANGE UP (ref 3.8–10.5)
WBC # FLD AUTO: 5.82 K/UL — SIGNIFICANT CHANGE UP (ref 3.8–10.5)

## 2019-12-17 RX ORDER — INSULIN GLARGINE 100 [IU]/ML
48 INJECTION, SOLUTION SUBCUTANEOUS AT BEDTIME
Refills: 0 | Status: DISCONTINUED | OUTPATIENT
Start: 2019-12-17 | End: 2019-12-17

## 2019-12-17 RX ORDER — POLYETHYLENE GLYCOL 3350 17 G/17G
17 POWDER, FOR SOLUTION ORAL DAILY
Refills: 0 | Status: DISCONTINUED | OUTPATIENT
Start: 2019-12-17 | End: 2019-12-24

## 2019-12-17 RX ORDER — INSULIN GLARGINE 100 [IU]/ML
56 INJECTION, SOLUTION SUBCUTANEOUS AT BEDTIME
Refills: 0 | Status: DISCONTINUED | OUTPATIENT
Start: 2019-12-17 | End: 2019-12-19

## 2019-12-17 RX ORDER — INSULIN LISPRO 100/ML
VIAL (ML) SUBCUTANEOUS AT BEDTIME
Refills: 0 | Status: DISCONTINUED | OUTPATIENT
Start: 2019-12-17 | End: 2019-12-24

## 2019-12-17 RX ORDER — INSULIN LISPRO 100/ML
4 VIAL (ML) SUBCUTANEOUS
Refills: 0 | Status: DISCONTINUED | OUTPATIENT
Start: 2019-12-17 | End: 2019-12-18

## 2019-12-17 RX ORDER — INSULIN LISPRO 100/ML
VIAL (ML) SUBCUTANEOUS
Refills: 0 | Status: DISCONTINUED | OUTPATIENT
Start: 2019-12-17 | End: 2019-12-24

## 2019-12-17 RX ADMIN — Medication 1 SPRAY(S): at 18:35

## 2019-12-17 RX ADMIN — Medication 0.5 MILLIGRAM(S): at 05:34

## 2019-12-17 RX ADMIN — Medication 30 MILLIGRAM(S): at 18:34

## 2019-12-17 RX ADMIN — Medication 4 UNIT(S): at 18:30

## 2019-12-17 RX ADMIN — INSULIN GLARGINE 56 UNIT(S): 100 INJECTION, SOLUTION SUBCUTANEOUS at 22:41

## 2019-12-17 RX ADMIN — Medication 2: at 09:34

## 2019-12-17 RX ADMIN — Medication 6: at 18:30

## 2019-12-17 RX ADMIN — HEPARIN SODIUM 5000 UNIT(S): 5000 INJECTION INTRAVENOUS; SUBCUTANEOUS at 05:33

## 2019-12-17 RX ADMIN — OXYMETAZOLINE HYDROCHLORIDE 1 SPRAY(S): 0.5 SPRAY NASAL at 05:34

## 2019-12-17 RX ADMIN — Medication 50 MILLIGRAM(S): at 05:35

## 2019-12-17 RX ADMIN — Medication 1 SPRAY(S): at 05:34

## 2019-12-17 RX ADMIN — Medication 0.5 MILLIGRAM(S): at 18:37

## 2019-12-17 RX ADMIN — Medication 3 MILLILITER(S): at 18:37

## 2019-12-17 RX ADMIN — Medication 1 TABLET(S): at 13:04

## 2019-12-17 RX ADMIN — TAMSULOSIN HYDROCHLORIDE 0.4 MILLIGRAM(S): 0.4 CAPSULE ORAL at 22:41

## 2019-12-17 RX ADMIN — Medication 3 MILLILITER(S): at 05:37

## 2019-12-17 RX ADMIN — Medication 1 TABLET(S): at 05:33

## 2019-12-17 RX ADMIN — Medication 3 MILLILITER(S): at 13:05

## 2019-12-17 RX ADMIN — Medication 8: at 13:03

## 2019-12-17 RX ADMIN — Medication 3 MILLILITER(S): at 00:18

## 2019-12-17 RX ADMIN — POLYETHYLENE GLYCOL 3350 17 GRAM(S): 17 POWDER, FOR SOLUTION ORAL at 18:34

## 2019-12-17 RX ADMIN — Medication 2000 UNIT(S): at 13:04

## 2019-12-17 RX ADMIN — HEPARIN SODIUM 5000 UNIT(S): 5000 INJECTION INTRAVENOUS; SUBCUTANEOUS at 18:37

## 2019-12-17 RX ADMIN — LORATADINE 10 MILLIGRAM(S): 10 TABLET ORAL at 13:04

## 2019-12-17 RX ADMIN — Medication 30 MILLIGRAM(S): at 05:35

## 2019-12-17 RX ADMIN — OXYMETAZOLINE HYDROCHLORIDE 1 SPRAY(S): 0.5 SPRAY NASAL at 18:36

## 2019-12-17 RX ADMIN — Medication 1 TABLET(S): at 18:34

## 2019-12-17 NOTE — SWALLOW BEDSIDE ASSESSMENT ADULT - SWALLOW EVAL: DIAGNOSIS
Patient admitted s/p fall, and now with oropharyngeal dysphagia. Chart reviewed, however patient's blood glucose >300, therefore dysphagia assessment will be on hold til tomorrow. Discussed with GONZALO Zazueta.

## 2019-12-17 NOTE — PROGRESS NOTE ADULT - ASSESSMENT
85 yo male PMhx DM on insulin, HTN, BPH, HLD p/w generalized weakness and inability to ambulate after a fall, congestion with GRANT     congestion/wheezing  likely URI  abx as per ID  nebs  pulm follow up    fevers  id consulted  check blood and urine cult - ngtd  abx as per ID

## 2019-12-17 NOTE — PROGRESS NOTE ADULT - SUBJECTIVE AND OBJECTIVE BOX
Infectious Diseases progress note:    Subjective:  NAD, afebrile.  Denies cough/sob/cp/fever/chills.      ROS:  CONSTITUTIONAL:  No fever, chills, rigors  CARDIOVASCULAR:  No chest pain or palpitations  RESPIRATORY:   No SOB, cough, dyspnea on exertion.  No wheezing  GASTROINTESTINAL:  No abd pain, N/V, diarrhea/constipation  EXTREMITIES:  No swelling or joint pain  GENITOURINARY:  No burning on urination, increased frequency or urgency.  No flank pain  NEUROLOGIC:  No HA, visual disturbances  SKIN: No rashes    Allergies    No Known Allergies    Intolerances        ANTIBIOTICS/RELEVANT:  antimicrobials  amoxicillin  500 milliGRAM(s)/clavulanate 1 Tablet(s) Oral two times a day    immunologic:    OTHER:  albuterol/ipratropium for Nebulization 3 milliLiter(s) Nebulizer every 6 hours  artificial tears (preservative free) Ophthalmic Solution 1 Drop(s) Both EYES four times a day PRN  buDESOnide    Inhalation Suspension 0.5 milliGRAM(s) Inhalation two times a day  cholecalciferol 2000 Unit(s) Oral daily  dextrose 40% Gel 15 Gram(s) Oral once PRN  dextrose 5%. 1000 milliLiter(s) IV Continuous <Continuous>  dextrose 50% Injectable 12.5 Gram(s) IV Push once  dextrose 50% Injectable 25 Gram(s) IV Push once  dextrose 50% Injectable 25 Gram(s) IV Push once  fluticasone propionate 50 MICROgram(s)/spray Nasal Spray 1 Spray(s) Both Nostrils two times a day  glucagon  Injectable 1 milliGRAM(s) IntraMuscular once PRN  heparin  Injectable 5000 Unit(s) SubCutaneous every 12 hours  insulin glargine Injectable (LANTUS) 48 Unit(s) SubCutaneous at bedtime  insulin lispro (HumaLOG) corrective regimen sliding scale   SubCutaneous three times a day before meals  insulin lispro (HumaLOG) corrective regimen sliding scale   SubCutaneous at bedtime  insulin lispro Injectable (HumaLOG) 4 Unit(s) SubCutaneous three times a day before meals  loratadine 10 milliGRAM(s) Oral daily  multivitamin/minerals 1 Tablet(s) Oral daily  oxymetazoline 0.05% Nasal Spray 1 Spray(s) Both Nostrils two times a day  predniSONE   Tablet 50 milliGRAM(s) Oral daily  pseudoephedrine 30 milliGRAM(s) Oral two times a day  tamsulosin 0.4 milliGRAM(s) Oral at bedtime      Objective:  Vital Signs Last 24 Hrs  T(C): 36.8 (17 Dec 2019 04:19), Max: 37.2 (16 Dec 2019 21:21)  T(F): 98.2 (17 Dec 2019 04:19), Max: 98.9 (16 Dec 2019 21:21)  HR: 101 (17 Dec 2019 04:19) (101 - 118)  BP: 156/81 (17 Dec 2019 04:19) (150/75 - 156/81)  BP(mean): --  RR: 19 (17 Dec 2019 04:19) (19 - 20)  SpO2: 96% (17 Dec 2019 04:19) (96% - 97%)    PHYSICAL EXAM:  Constitutional:NAD  Eyes:CURTIS, EOMI, mild periorbital eccymosis  Ear/Nose/Throat: no thrush, mucositis.  Moist mucous membranes	  Neck:no JVD, no lymphadenopathy, supple  Respiratory: CTA jamila  Cardiovascular: S1S2 RRR, no murmurs  Gastrointestinal:soft, nontender,  nondistended (+) BS  Extremities:no e/e/c  Skin:  no rashes, open wounds or ulcerations        LABS:                        12.2   5.82  )-----------( 186      ( 17 Dec 2019 09:22 )             37.3     12-17    142  |  103  |  23  ----------------------------<  268<H>  3.5   |  21<L>  |  1.03    Ca    8.6      17 Dec 2019 07:15  Mg     1.9     12-16    TPro  6.4  /  Alb  3.5  /  TBili  0.4  /  DBili  x   /  AST  40  /  ALT  31  /  AlkPhos  58  12-15    PT/INR - ( 15 Dec 2019 15:36 )   PT: 13.7 sec;   INR: 1.20 ratio         PTT - ( 15 Dec 2019 15:36 )  PTT:22.0 sec  Urinalysis Basic - ( 15 Dec 2019 18:05 )    Color: Yellow / Appearance: Clear / S.029 / pH: x  Gluc: x / Ketone: Small  / Bili: Negative / Urobili: Negative   Blood: x / Protein: 30 mg/dL / Nitrite: Negative   Leuk Esterase: Negative / RBC: 4 /hpf / WBC 3 /HPF   Sq Epi: x / Non Sq Epi: 2 /hpf / Bacteria: Negative                  Rapid RVP Result: [facility]  Rapid RVP Result: [facility]          MICROBIOLOGY:    Culture - Urine (12.15.19 @ 22:11)    Specimen Source: .Urine Clean Catch (Midstream)    Culture Results:   Aerococcus species  "Aerococcus spp. are predictably susceptible to penicillin,  ampicillin, tetracycline, and vancomycin.  All isolates are  resistant to sulfonamides"  <10,000 CFU/ml Normal Urogenital pema present          RADIOLOGY & ADDITIONAL STUDIES:    < from: CT Thoracic Spine Reform No Cont (12.15.19 @ 17:20) >  IMPRESSION:   CT BRAIN: No acute intracranial bleeding, mass effect, or shift.   Resolving right frontal scalp hematoma, now small compared to prior CT   head from 2019.     CT CERVICAL SPINE: No acute fracture subluxation. Multilevel   spondylosis..     CT THORACIC SPINE: No acute fracture subluxation. Multilevel spondylosis.   Increased density along the left posterior lateral aspect of the central   canal at the T6-7 level possibly representing a meningioma. Follow-up   thoracic spine MRI is recommended.  Partially imaged abdomen demonstrates right renal hypodensity and nodular   thickening of the bilateral adrenal glands better evaluated on same day   CT abdomen pelvis.     CT LUMBAR SPINE: No acute fracture subluxation. Multilevel spondylosis.    < end of copied text >

## 2019-12-17 NOTE — PROGRESS NOTE ADULT - ASSESSMENT
ASSESSMENT:    rhinorrhea, post-nasal drip, nasal congestion -> URI/sinusitis although RVP is negative    bronchospasm -> improved    likely obstructive sleep apnea    recurrent falls with mild elevation in CPK    HTN/HLD/DM    PLAN/RECOMMENDATIONS:    stable oxygenation on room air  augmentin 500mg 2 times daily  prednisone 50mg daily - glucose control on steroids  albuterol/atrovent/pulmicort nebs  flonase/Afrin/claritin/sudafed  DVT prophylaxis  out of bed and into the chair  physical therapy evaluation noted  outpatient balance and strengthening training  flomax  cardiology evaluation noted    Will follow with you. Plan of care discussed with the patient at bedside. Discharge planning.    Ignacio Rollins MD, [facility]  [phone]  Pulmonary Medicine

## 2019-12-17 NOTE — PHARMACOTHERAPY INTERVENTION NOTE - COMMENTS
Patient is a diabetic (A1C: 7.9%) currently on Lantus 40units HS. At home was on Metformin 1g BID, Toujeo 70units HS, Novolog 35/35/45 units before meals, and Victoza daily for DM management. His BGs yesterday 12/16 were 220/204/205/328, today 12/17 BGs were 235-329. Patient used 9 units of correctional insulin yesterday. Patient was also initiated on Prednisone 50mg daily. Would recommend increase basal dose by 20% to Lantus 48 units HS, and adding on Humalog 4 units before meals to better glycemic control. Recommend switching high intensity sliding scale to moderate sliding scale to prevent hypoglycemia. Discussed with GONZALO Zazueta.    Crystal Willis, PharmD  PGY1 Pharmacy Practice Resident  [phone] Patient is a diabetic (A1C: 7.9%) currently on Lantus 40units HS. At home was on Metformin 1g BID, Toujeo 70units HS, Novolog 35/35/45 units before meals, and Victoza daily for DM management. His BGs yesterday 12/16 were 220/204/205/328, today 12/17 BGs were 235-329. Patient used 9 units of correctional insulin yesterday. Patient was also initiated on Prednisone 50mg daily. Would recommend increase basal dose by 20% to Lantus 48 units HS, and adding on Humalog 4 units before meals to better glycemic control. Recommend switching low intensity sliding scale to moderate sliding scale to manage hyperglycemia. Discussed with GONZALO Zazueta.    Crystal Willis, PharmD  PGY1 Pharmacy Practice Resident  [phone] Patient is a diabetic (A1C: 7.9%) currently on Lantus 40units HS. At home was on Metformin 1g BID, Toujeo 70units HS, Novolog 35/35/45 units before meals, and Victoza daily for DM management. His BGs yesterday 12/16 were 220/204/205/328, today 12/17 BGs were 235-329. Patient used 9 units of correctional insulin yesterday. Patient was also initiated on Prednisone 50mg daily. Would recommend increase basal dose by 40% to Lantus 56 units HS, and adding on Humalog 4 units before meals to better glycemic control. Recommend switching low intensity sliding scale to moderate sliding scale to manage hyperglycemia. Discussed with GONZALO Zazueta.    Crystal Willis, PharmD  PGY1 Pharmacy Practice Resident  [phone]

## 2019-12-17 NOTE — PROGRESS NOTE ADULT - SUBJECTIVE AND OBJECTIVE BOX
[facility] - [facility] - PROGRESS NOTE    CHIEF COMPLAINT: sinusitis; bronchospasm; rhinorrhea; nasal congestion; post nasal drip; s/p fall    INTERVAL HISTORY: much less rhinorrhea, nasal congestion and post-nasal drip; no shortness of breath on room air; no cough or sputum production; decreased chest congestion and wheeze; no fevers, chills or sweats; no chest pain/pressure or palpitations; has not walked much;    REVIEW OF SYSTEMS:  Constitutional: As per interval history  HEENT: As per interval history  CV: As per interval history  Resp: As per interval history  GI: Within normal limits   : Within normal limits  Musculoskeletal: Within normal limits  Skin: Within normal limits  Neurological: Within normal limits  Psychiatric: Within normal limits  Endocrine: Within normal limits  Hematologic/Lymphatic: Within normal limits  Allergic/Immunologic: Within normal limits    MEDICATIONS:     Pulmonary "  albuterol/ipratropium for Nebulization 3 milliLiter(s) Nebulizer every 6 hours  buDESOnide    Inhalation Suspension 0.5 milliGRAM(s) Inhalation two times a day  loratadine 10 milliGRAM(s) Oral daily  pseudoephedrine 30 milliGRAM(s) Oral two times a day    Anti-microbials:  amoxicillin  500 milliGRAM(s)/clavulanate 1 Tablet(s) Oral two times a day    Cardiovascular:  tamsulosin 0.4 milliGRAM(s) Oral at bedtime    Other:  cholecalciferol 2000 Unit(s) Oral daily  dextrose 5%. 1000 milliLiter(s) IV Continuous <Continuous>  dextrose 50% Injectable 12.5 Gram(s) IV Push once  dextrose 50% Injectable 25 Gram(s) IV Push once  dextrose 50% Injectable 25 Gram(s) IV Push once  fluticasone propionate 50 MICROgram(s)/spray Nasal Spray 1 Spray(s) Both Nostrils two times a day  heparin  Injectable 5000 Unit(s) SubCutaneous every 12 hours  insulin glargine Injectable (LANTUS) 40 Unit(s) SubCutaneous at bedtime  insulin lispro (HumaLOG) corrective regimen sliding scale   SubCutaneous three times a day before meals  insulin lispro (HumaLOG) corrective regimen sliding scale   SubCutaneous at bedtime  multivitamin/minerals 1 Tablet(s) Oral daily  oxymetazoline 0.05% Nasal Spray 1 Spray(s) Both Nostrils two times a day  predniSONE   Tablet 50 milliGRAM(s) Oral daily    MEDICATIONS  (PRN):  artificial tears (preservative free) Ophthalmic Solution 1 Drop(s) Both EYES four times a day PRN Dry Eyes  dextrose 40% Gel 15 Gram(s) Oral once PRN Blood Glucose LESS THAN 70 milliGRAM(s)/deciliter  glucagon  Injectable 1 milliGRAM(s) IntraMuscular once PRN Glucose LESS THAN 70 milligrams/deciliter        OBJECTIVE:    I&O's Detail    16 Dec 2019 07:01  -  17 Dec 2019 07:00  --------------------------------------------------------  IN:    Oral Fluid: 340 mL  Total IN: 340 mL    OUT:    Voided: 850 mL  Total OUT: 850 mL    Total NET: -510 mL    POCT Blood Glucose.: 235 mg/dL (17 Dec 2019 08:39)  POCT Blood Glucose.: 328 mg/dL (16 Dec 2019 22:01)  POCT Blood Glucose.: 205 mg/dL (16 Dec 2019 18:06)  POCT Blood Glucose.: 204 mg/dL (16 Dec 2019 12:19)      PHYSICAL EXAM:       ICU Vital Signs Last 24 Hrs  T(C): 36.8 (17 Dec 2019 04:19), Max: 37.2 (16 Dec 2019 21:21)  T(F): 98.2 (17 Dec 2019 04:19), Max: 98.9 (16 Dec 2019 21:21)  HR: 101 (17 Dec 2019 04:19) (101 - 118)  BP: 156/81 (17 Dec 2019 04:19) (150/75 - 163/80)  BP(mean): --  ABP: --  ABP(mean): --  RR: 19 (17 Dec 2019 04:19) (19 - 20)  SpO2: 96% (17 Dec 2019 04:19) (94% - 97%) on room air     General: Awake. Alert. Cooperative. No distress. Appears stated age. Eating breakfast. 	  HEENT: Atraumatic. Normocephalic. Anicteric. Normal oral mucosa. PERRL. EOMI. Decreased nasal mucosal injection and erythema.  Neck: Supple. Trachea midline. Thyroid without enlargement/tenderness/nodules. No carotid bruit. No JVD. Short and wide  Cardiovascular: Regular rate and rhythm. S1 S2 normal. No murmurs, rubs or gallops.  Respiratory: Respirations unlabored. Minimal bilateral wheeze. No curvature.  Abdomen: Soft. Non-tender. Non-distended. No organomegaly. No masses. Normal bowel sounds. Obese.  Extremities: Warm to touch. No clubbing or cyanosis. No pedal edema.  Pulses: 2+ peripheral pulses all extremities.	  Skin: Ecchymoses around right eye and on right chin/neck. Hematoma on the top of the right sided of the head.  Lymph Nodes: Cervical, supraclavicular and axillary nodes normal  Neurological: Motor and sensory examination equal and normal. A and O x 3  Psychiatry: Appropriate mood and affect.    LABS:                          12.2   5.82  )-----------( 186      ( 17 Dec 2019 09:22 )             37.3     CBC    WBC  5.82 <==, 5.62 <==, 4.58 <==    Hemoglobin  12.2 <<==, 11.7 <<==, 12.3 <<==    Hematocrit  37.3 <==, 35.7 <==, 37.6 <==    Platelets  186 <==, 180 <==, 164 <==      142  |  103  |  23  ----------------------------<  268<H>      3.5   |  21<L>  |  1.03      LYTES    sodium  142 <==, 143 <==, 142 <==    potassium   3.5 <==, 3.6 <==, 4.3 <==    chloride  103 <==, 103 <==, 103 <==    carbon dioxide  21 <==, 19 <==, 22 <==    =============================================================================================  RENAL FUNCTION:    Creatinine:   1.03  <<==, 1.17  <<==, 1.21  <<==    BUN:   23 <==, 27 <==, 32 <==    ============================================================================================    calcium   8.6 <==, 8.4 <==, 8.7 <==    phos       mag   1.9 <==, 1.5 <==    ============================================================================================  LFTs    AST:   40 <==     ALT:  31  <==     AP:  58  <=    Bili:  0.4  <=      PT/INR - ( 15 Dec 2019 15:36 )   PT: 13.7 sec;   INR: 1.20 ratio       PTT - ( 15 Dec 2019 15:36 )  PTT: 22.0 sec    Venous Blood Gas:  12-15 @ 15:36  7.42/40/39/25/74  VBG Lactate: 1.9    CARDIAC MARKERS ( 15 Dec 2019 20:49 )   U/L /CKMB x     /CKMB Units x        troponin x        CARDIAC MARKERS ( 15 Dec 2019 15:36 )   U/L /CKMB x     /CKMB Units x        troponin x          MICROBIOLOGY:     Rapid Respiratory Viral Panel (12.16.19 @ 10:27)    Rapid RVP Result: NotDetec: This Respiratory Panel uses polymerase chain reaction (PCR) to detect for  adenovirus; coronavirus (HKU1, NL63, 229E, OC43); human metapneumovirus  (hMPV); human enterovirus/rhinovirus (Entero/RV); influenza A; influenza  A/H1; influenza A/H3; influenza A/H1-2009; influenza B; parainfluenza  viruses 1, 2, 3, 4; respiratory syncytial virus; Mycoplasma pneumoniae;  and Chlamydophila pneumoniae.    Urinalysis Basic - ( 15 Dec 2019 18:05 )    Color: Yellow / Appearance: Clear / S.029 / pH: x  Gluc: x / Ketone: Small  / Bili: Negative / Urobili: Negative   Blood: x / Protein: 30 mg/dL / Nitrite: Negative   Leuk Esterase: Negative / RBC: 4 /hpf / WBC 3 /HPF   Sq Epi: x / Non Sq Epi: 2 /hpf / Bacteria: Negative    Culture - Urine (12.15.19 @ 22:11)    Specimen Source: .Urine Clean Catch (Midstream)    Culture Results:   Aerococcus species  "Aerococcus spp. are predictably susceptible to penicillin,  ampicillin, tetracycline, and vancomycin.  All isolates are  resistant to sulfonamides"  <10,000 CFU/ml Normal Urogenital pema present    RADIOLOGY:  [x] Chest radiographs reviewed and interpreted by me    EXAM:  XR CHEST AP OR PA 1V                          PROCEDURE DATE:  12/15/2019      INTERPRETATION:  CLINICAL INFORMATION: Cough and fever.    EXAM: AP chest radiograph    COMPARISON: None    FINDINGS:  The heart is normal in size. Calcified aortic knob. No focal   consolidations. No pleural effusion or pneumothorax.  The visualized osseous structures demonstrate no acute pathology.    IMPRESSION:  Clear lungs    KATHY SNOW M.D., RADIOLOGY RESIDENT  This document has been electronically signed.  CAITIE MEJIAS M.D., ATTENDING RADIOLOGIST  This document has been electronically signed. Dec 16 2019  9:38AM  ---------------------------------------------------------------------------------------------------------------    EXAM:  CT ABDOMEN AND PELVIS IC                          EXAM:  CT CHEST IC                          PROCEDURE DATE:  12/15/2019      INTERPRETATION:  CLINICAL INFORMATION: Trauma. Status post fall with   diffuse abdominal tenderness.    COMPARISON: None.    PROCEDURE:   CT of the Chest, Abdomen and Pelvis was performed with intravenous   contrast.   Imaging was performed through the chest in the arterial phase followed by   imaging of the abdomen and pelvis in the portal venous phase.  Intravenous contrast: 90 ml Omnipaque 350. 10 ml discarded.  Oral contrast: None.  Sagittal and coronal reformats were performed.    FINDINGS:    CHEST:     LUNGS AND LARGE AIRWAYS: Patent central airways. No consolidation.  PLEURA: No pleural effusion. No pneumothorax.  VESSELS: Atherosclerotic changes of the aorta and coronary arteries.  HEART: Heart size is normal. No pericardial effusion. Mitral annulus and   aortic valve calcifications.  MEDIASTINUM AND KARLA: No lymphadenopathy.  CHEST WALL AND LOWER NECK: Within normal limits.    ABDOMEN AND PELVIS:    LIVER: Steatosis.  BILE DUCTS: Normal caliber.  GALLBLADDER: Within normal limits.  SPLEEN: Within normal limits.  PANCREAS: Within normal limits.  ADRENALS: 7 mm indeterminate left adrenal nodule.. Right adrenal gland is   unremarkable..  KIDNEYS/URETERS: No renal stones or hydronephrosis. Right upper pole   exophytic complex cystic lesion with irregular thick mural thickening..   Bilateral renal subcentimeter hypodensities which are too small to   characterize. Punctate 2 mm nonobstructing calculus in the midpole of the   left kidney.    BLADDER: 5 cm left and 1 cm right posterior bladder diverticuli.    REPRODUCTIVE ORGANS: Prostate within normal limits.    BOWEL: No bowel obstruction. Appendix is normal.  PERITONEUM: Trace fluid in the right paracolic gutter. No   pneumoperitoneum.  VESSELS: Atherosclerotic changes.  RETROPERITONEUM/LYMPH NODES: No lymphadenopathy.    ABDOMINAL WALL: Small degree of ventral abdominal wall subcutaneous   stranding and skin thickening, left greater the right, likely related to   traumatic contusion. No discrete hematoma. Small bilateral fat-containing   inguinal hernias..  BONES: No acute fracture or dislocation. Degenerative changes.    IMPRESSION:   Trace amount of free fluid in the right paracolic gutter.    No visceral injury is identified.    Ventral abdominal wall subcutaneous stranding and skin thickening, left   greater the right, likely related to traumatic contusion. No discrete   hematoma.     Complex exophytic right renal cystic lesion and indeterminate left   adrenal nodule. Further characterization with nonemergent contrast   enhanced MRI is recommended.    ROULA BACH M.D., RADIOLOGY RESIDENT  This document has been electronically signed.  MARK GUNDERSON M.D., ATTENDING RADIOLOGIST  This document has been electronically signed. Dec 15 2019  6:35PM  ---------------------------------------------------------------------------------------------------------------

## 2019-12-17 NOTE — PROGRESS NOTE ADULT - ASSESSMENT
85 yo male PMhx DM on insulin, HTN, BPH, HLD presents to the ED with unwitnessed fall the day prior with inability to get up and ambulate. Pt reports he slipped off the bed the day prior onto floor and was unable to get up.  Wife attempted to help him up but was unable, so placed pillow under head and pt slept on floor overnight. This AM pt was still unable to get up so EMS was called. Patient states he uses a cane to ambulate outside the house, but ambulate independently.  Patient had a trip and fall outside the house in [facility], resulting in bruising and hematoma R chin, abd, and R frontal scalp, evaluated here in ED, and discharged home.  Patient has been feeling congested for the past couple days, decrease PO intake, and GRANT.  Patient denies fever (but on Ibuprofen 800 3x daily for back and leg pain), chest pain, cough, LOC, dysuria or abd pain.  No sick contact.  c/o gen weakness and inability to ambulate (15 Dec 2019 22:05)    ER vitals:  T 98.2, P 104, /98.  Tmax 100.8 (R).  No leukocytosis.  No acute fx on CT imaging.  CT c/a/p no visceral injuries, no consolidation, no infectious focus identified.  UA (-) nit/LE.  RVP (-) x 2.      Pt seen by Pulm for c/o  rhinorrhea, post-nasal drip, nasal congestion.  Started on treatment for URI with bronchospasm although RVP (-).   -> URI although RVP is negative.  Pt started on Prednisone 50mg Qdaily and augmentin 500mg PO bid.     ID consult called for evaluation of fever.     Fever:    - Possible URI given congestion with post-nasal drip, pt with wheezing.  Started on steroids and empiric abx.  No consolidations on CT imaging.      - Cont to monitor temp curve and wbc.  f/u blood cultures, UA, ucx.  RVP (-) x 2    - Clinically improving on current managment.  Complete 5-7 day course of augmentin.       Will follow,    Zenia Berg  [phone]

## 2019-12-17 NOTE — PROGRESS NOTE ADULT - SUBJECTIVE AND OBJECTIVE BOX
HOLLIE STEVENS  84y Male  MRN:[id number]    Patient is a 84y old  Male who presents with a chief complaint of s/p fall (15 Dec 2019 22:05)    HPI:  85 yo male PMhx DM on insulin, HTN, BPH, HLD presents to the ED with unwitnessed fall the day prior with inability to get up and ambulate. Pt reports he slipped off the bed the day prior onto floor and was unable to get up.  Wife attempted to help him up but was unable, so placed pillow under head and pt slept on floor overnight. This AM pt was still unable to get up so EMS was called. Patient states he uses a cane to ambulate outside the house, but ambulate independently.  Patient had a trip and fall outside the house in [place], resulting in bruising and hematoma R chin, abd, and R frontal scalp, evaluated here in ED, and discharged home.  Patient has been feeling congested for the past couple days, decrease PO intake, and GRANT.  Patient denies fever (but on Ibuprofen 800 3x daily for back and leg pain), chest pain, cough, LOC, dysuria or abd pain.  No sick contact.  c/o gen weakness and inability to ambulate (15 Dec 2019 22:05)      Patient seen and evaluated at bedside. No acute events overnight except as noted.    Interval HPI: feels better today    PAST MEDICAL & SURGICAL HISTORY:  Diabetes mellitus type 2 in nonobese  BPH (benign prostatic hyperplasia)  Hyperlipidemia  HTN (hypertension)  H/O arthroscopy: R knee      REVIEW OF SYSTEMS:  as per hpi       VITALS:  Vital Signs Last 24 Hrs  T(C): 37.1 (17 Dec 2019 14:12), Max: 37.2 (16 Dec 2019 21:21)  T(F): 98.7 (17 Dec 2019 14:12), Max: 98.9 (16 Dec 2019 21:21)  HR: 111 (17 Dec 2019 14:12) (101 - 111)  BP: 172/94 (17 Dec 2019 14:12) (155/79 - 172/94)  BP(mean): --  RR: 18 (17 Dec 2019 14:12) (18 - 20)  SpO2: 97% (17 Dec 2019 14:12) (96% - 97%)      PHYSICAL EXAM:  GENERAL: NAD, well-developed  HEAD:  +head trauma, +ecchymosis on face/neck  EYES: EOMI, PERRLA, conjunctiva and sclera clear  NECK: Supple, No JVD  CHEST/LUNG: b/l congestion/wheeze   HEART: S1, S2; No murmurs, rubs, or gallops  ABDOMEN: Soft, Nontender, Nondistended; Bowel sounds present  EXTREMITIES:  2+ Peripheral Pulses, No clubbing, cyanosis, or edema  PSYCH: Normal affect  NEUROLOGY: AAOX3; non-focal  SKIN: No rashes or lesions    Consultant(s) Notes Reviewed:  [x ] YES  [ ] NO  Care Discussed with Consultants/Other Providers [ x] YES  [ ] NO    MEDS:  MEDICATIONS  (STANDING):  albuterol/ipratropium for Nebulization 3 milliLiter(s) Nebulizer every 6 hours  amoxicillin  500 milliGRAM(s)/clavulanate 1 Tablet(s) Oral two times a day  buDESOnide    Inhalation Suspension 0.5 milliGRAM(s) Inhalation two times a day  cholecalciferol 2000 Unit(s) Oral daily  dextrose 5%. 1000 milliLiter(s) (50 mL/Hr) IV Continuous <Continuous>  dextrose 50% Injectable 12.5 Gram(s) IV Push once  dextrose 50% Injectable 25 Gram(s) IV Push once  dextrose 50% Injectable 25 Gram(s) IV Push once  fluticasone propionate 50 MICROgram(s)/spray Nasal Spray 1 Spray(s) Both Nostrils two times a day  heparin  Injectable 5000 Unit(s) SubCutaneous every 12 hours  insulin glargine Injectable (LANTUS) 56 Unit(s) SubCutaneous at bedtime  insulin lispro (HumaLOG) corrective regimen sliding scale   SubCutaneous three times a day before meals  insulin lispro (HumaLOG) corrective regimen sliding scale   SubCutaneous at bedtime  insulin lispro Injectable (HumaLOG) 4 Unit(s) SubCutaneous three times a day before meals  loratadine 10 milliGRAM(s) Oral daily  multivitamin/minerals 1 Tablet(s) Oral daily  oxymetazoline 0.05% Nasal Spray 1 Spray(s) Both Nostrils two times a day  predniSONE   Tablet 50 milliGRAM(s) Oral daily  pseudoephedrine 30 milliGRAM(s) Oral two times a day  tamsulosin 0.4 milliGRAM(s) Oral at bedtime    MEDICATIONS  (PRN):  artificial tears (preservative free) Ophthalmic Solution 1 Drop(s) Both EYES four times a day PRN Dry Eyes  dextrose 40% Gel 15 Gram(s) Oral once PRN Blood Glucose LESS THAN 70 milliGRAM(s)/deciliter  glucagon  Injectable 1 milliGRAM(s) IntraMuscular once PRN Glucose LESS THAN 70 milligrams/deciliter      ALLERGIES:  No Known Allergies      LABS:                               12.2   5.82  )-----------( 186      ( 17 Dec 2019 09:22 )             37.3   12-17    142  |  103  |  23  ----------------------------<  268<H>  3.5   |  21<L>  |  1.03    Ca    8.6      17 Dec 2019 07:15  Mg     1.9     12-16    < from: Xray Chest 1 View- PORTABLE-Urgent (12.16.19 @ 12:45) >  IMPRESSION:    Clear lungs.    < end of copied text >         < from: CT Thoracic Spine Reform No Cont (12.15.19 @ 17:20) >    IMPRESSION:   CT BRAIN: No acute intracranial bleeding, mass effect, or shift.   Resolving right frontal scalp hematoma, now small compared to prior CT   head from 11/28/2019.     CT CERVICAL SPINE: No acute fracture subluxation. Multilevel   spondylosis..     CT THORACIC SPINE: No acute fracture subluxation. Multilevel spondylosis.   Increased density along the left posterior lateral aspect of the central   canal at the T6-7 level possibly representing a meningioma. Follow-up   thoracic spine MRI is recommended.  Partially imaged abdomen demonstrates right renal hypodensity and nodular   thickening of the bilateral adrenal glands better evaluated on same day   CT abdomen pelvis.     CT LUMBAR SPINE: No acute fracture subluxation. Multilevel spondylosis.    < end of copied text >

## 2019-12-18 ENCOUNTER — TRANSCRIPTION ENCOUNTER (OUTPATIENT)
Age: 84
End: 2019-12-18

## 2019-12-18 DIAGNOSIS — I10 ESSENTIAL (PRIMARY) HYPERTENSION: ICD-10-CM

## 2019-12-18 DIAGNOSIS — F01.50 VASCULAR DEMENTIA WITHOUT BEHAVIORAL DISTURBANCE: ICD-10-CM

## 2019-12-18 DIAGNOSIS — F05 DELIRIUM DUE TO KNOWN PHYSIOLOGICAL CONDITION: ICD-10-CM

## 2019-12-18 DIAGNOSIS — E78.5 HYPERLIPIDEMIA, UNSPECIFIED: ICD-10-CM

## 2019-12-18 LAB
ANION GAP SERPL CALC-SCNC: 16 MMOL/L — SIGNIFICANT CHANGE UP (ref 5–17)
ANION GAP SERPL CALC-SCNC: 17 MMOL/L — SIGNIFICANT CHANGE UP (ref 5–17)
BASE EXCESS BLDV CALC-SCNC: 1.5 MMOL/L — SIGNIFICANT CHANGE UP (ref -2–2)
BASOPHILS # BLD AUTO: 0.02 K/UL — SIGNIFICANT CHANGE UP (ref 0–0.2)
BASOPHILS # BLD AUTO: 0.02 K/UL — SIGNIFICANT CHANGE UP (ref 0–0.2)
BASOPHILS NFR BLD AUTO: 0.2 % — SIGNIFICANT CHANGE UP (ref 0–2)
BASOPHILS NFR BLD AUTO: 0.2 % — SIGNIFICANT CHANGE UP (ref 0–2)
BUN SERPL-MCNC: 21 MG/DL — SIGNIFICANT CHANGE UP (ref 7–23)
BUN SERPL-MCNC: 27 MG/DL — HIGH (ref 7–23)
CA-I SERPL-SCNC: 1.14 MMOL/L — SIGNIFICANT CHANGE UP (ref 1.12–1.3)
CALCIUM SERPL-MCNC: 8.7 MG/DL — SIGNIFICANT CHANGE UP (ref 8.4–10.5)
CALCIUM SERPL-MCNC: 9.1 MG/DL — SIGNIFICANT CHANGE UP (ref 8.4–10.5)
CHLORIDE BLDV-SCNC: 111 MMOL/L — HIGH (ref 96–108)
CHLORIDE SERPL-SCNC: 104 MMOL/L — SIGNIFICANT CHANGE UP (ref 96–108)
CHLORIDE SERPL-SCNC: 104 MMOL/L — SIGNIFICANT CHANGE UP (ref 96–108)
CO2 BLDV-SCNC: 25 MMOL/L — SIGNIFICANT CHANGE UP (ref 22–30)
CO2 SERPL-SCNC: 21 MMOL/L — LOW (ref 22–31)
CO2 SERPL-SCNC: 22 MMOL/L — SIGNIFICANT CHANGE UP (ref 22–31)
CREAT SERPL-MCNC: 1.04 MG/DL — SIGNIFICANT CHANGE UP (ref 0.5–1.3)
CREAT SERPL-MCNC: 1.23 MG/DL — SIGNIFICANT CHANGE UP (ref 0.5–1.3)
EOSINOPHIL # BLD AUTO: 0.23 K/UL — SIGNIFICANT CHANGE UP (ref 0–0.5)
EOSINOPHIL # BLD AUTO: 0.61 K/UL — HIGH (ref 0–0.5)
EOSINOPHIL NFR BLD AUTO: 2.3 % — SIGNIFICANT CHANGE UP (ref 0–6)
EOSINOPHIL NFR BLD AUTO: 5.2 % — SIGNIFICANT CHANGE UP (ref 0–6)
GAS PNL BLDV: 143 MMOL/L — SIGNIFICANT CHANGE UP (ref 135–145)
GAS PNL BLDV: SIGNIFICANT CHANGE UP
GAS PNL BLDV: SIGNIFICANT CHANGE UP
GLUCOSE BLDC GLUCOMTR-MCNC: 143 MG/DL — HIGH (ref 70–99)
GLUCOSE BLDC GLUCOMTR-MCNC: 215 MG/DL — HIGH (ref 70–99)
GLUCOSE BLDC GLUCOMTR-MCNC: 231 MG/DL — HIGH (ref 70–99)
GLUCOSE BLDC GLUCOMTR-MCNC: 237 MG/DL — HIGH (ref 70–99)
GLUCOSE BLDC GLUCOMTR-MCNC: 250 MG/DL — HIGH (ref 70–99)
GLUCOSE BLDC GLUCOMTR-MCNC: 260 MG/DL — HIGH (ref 70–99)
GLUCOSE BLDV-MCNC: 290 MG/DL — HIGH (ref 70–99)
GLUCOSE SERPL-MCNC: 269 MG/DL — HIGH (ref 70–99)
GLUCOSE SERPL-MCNC: 300 MG/DL — HIGH (ref 70–99)
HCO3 BLDV-SCNC: 24 MMOL/L — SIGNIFICANT CHANGE UP (ref 21–29)
HCT VFR BLD CALC: 42.6 % — SIGNIFICANT CHANGE UP (ref 39–50)
HCT VFR BLD CALC: 43.9 % — SIGNIFICANT CHANGE UP (ref 39–50)
HCT VFR BLDA CALC: 45 % — SIGNIFICANT CHANGE UP (ref 39–50)
HGB BLD CALC-MCNC: 14.8 G/DL — SIGNIFICANT CHANGE UP (ref 13–17)
HGB BLD-MCNC: 14 G/DL — SIGNIFICANT CHANGE UP (ref 13–17)
HGB BLD-MCNC: 14.2 G/DL — SIGNIFICANT CHANGE UP (ref 13–17)
HOROWITZ INDEX BLDV+IHG-RTO: 28 — SIGNIFICANT CHANGE UP
IMM GRANULOCYTES NFR BLD AUTO: 1.5 % — SIGNIFICANT CHANGE UP (ref 0–1.5)
IMM GRANULOCYTES NFR BLD AUTO: 1.6 % — HIGH (ref 0–1.5)
LACTATE BLDV-MCNC: 1.8 MMOL/L — SIGNIFICANT CHANGE UP (ref 0.7–2)
LYMPHOCYTES # BLD AUTO: 1.58 K/UL — SIGNIFICANT CHANGE UP (ref 1–3.3)
LYMPHOCYTES # BLD AUTO: 1.63 K/UL — SIGNIFICANT CHANGE UP (ref 1–3.3)
LYMPHOCYTES # BLD AUTO: 14 % — SIGNIFICANT CHANGE UP (ref 13–44)
LYMPHOCYTES # BLD AUTO: 15.5 % — SIGNIFICANT CHANGE UP (ref 13–44)
MAGNESIUM SERPL-MCNC: 1.9 MG/DL — SIGNIFICANT CHANGE UP (ref 1.6–2.6)
MCHC RBC-ENTMCNC: 29.6 PG — SIGNIFICANT CHANGE UP (ref 27–34)
MCHC RBC-ENTMCNC: 30 PG — SIGNIFICANT CHANGE UP (ref 27–34)
MCHC RBC-ENTMCNC: 32.3 GM/DL — SIGNIFICANT CHANGE UP (ref 32–36)
MCHC RBC-ENTMCNC: 32.9 GM/DL — SIGNIFICANT CHANGE UP (ref 32–36)
MCV RBC AUTO: 91.4 FL — SIGNIFICANT CHANGE UP (ref 80–100)
MCV RBC AUTO: 91.5 FL — SIGNIFICANT CHANGE UP (ref 80–100)
MONOCYTES # BLD AUTO: 0.64 K/UL — SIGNIFICANT CHANGE UP (ref 0–0.9)
MONOCYTES # BLD AUTO: 0.81 K/UL — SIGNIFICANT CHANGE UP (ref 0–0.9)
MONOCYTES NFR BLD AUTO: 5.5 % — SIGNIFICANT CHANGE UP (ref 2–14)
MONOCYTES NFR BLD AUTO: 8 % — SIGNIFICANT CHANGE UP (ref 2–14)
NEUTROPHILS # BLD AUTO: 7.39 K/UL — SIGNIFICANT CHANGE UP (ref 1.8–7.4)
NEUTROPHILS # BLD AUTO: 8.57 K/UL — HIGH (ref 1.8–7.4)
NEUTROPHILS NFR BLD AUTO: 72.5 % — SIGNIFICANT CHANGE UP (ref 43–77)
NEUTROPHILS NFR BLD AUTO: 73.5 % — SIGNIFICANT CHANGE UP (ref 43–77)
NRBC # BLD: 0 /100 WBCS — SIGNIFICANT CHANGE UP (ref 0–0)
OTHER CELLS CSF MANUAL: 20 ML/DL — SIGNIFICANT CHANGE UP (ref 18–22)
PCO2 BLDV: 33 MMHG — LOW (ref 35–50)
PH BLDV: 7.48 — HIGH (ref 7.35–7.45)
PHOSPHATE SERPL-MCNC: 2.5 MG/DL — SIGNIFICANT CHANGE UP (ref 2.5–4.5)
PLATELET # BLD AUTO: 237 K/UL — SIGNIFICANT CHANGE UP (ref 150–400)
PLATELET # BLD AUTO: 269 K/UL — SIGNIFICANT CHANGE UP (ref 150–400)
PO2 BLDV: 109 MMHG — HIGH (ref 25–45)
POTASSIUM BLDV-SCNC: 3.1 MMOL/L — LOW (ref 3.5–5.3)
POTASSIUM SERPL-MCNC: 3.3 MMOL/L — LOW (ref 3.5–5.3)
POTASSIUM SERPL-MCNC: 3.4 MMOL/L — LOW (ref 3.5–5.3)
POTASSIUM SERPL-SCNC: 3.3 MMOL/L — LOW (ref 3.5–5.3)
POTASSIUM SERPL-SCNC: 3.4 MMOL/L — LOW (ref 3.5–5.3)
RBC # BLD: 4.66 M/UL — SIGNIFICANT CHANGE UP (ref 4.2–5.8)
RBC # BLD: 4.8 M/UL — SIGNIFICANT CHANGE UP (ref 4.2–5.8)
RBC # FLD: 12.8 % — SIGNIFICANT CHANGE UP (ref 10.3–14.5)
RBC # FLD: 12.8 % — SIGNIFICANT CHANGE UP (ref 10.3–14.5)
SAO2 % BLDV: 98 % — HIGH (ref 67–88)
SODIUM SERPL-SCNC: 142 MMOL/L — SIGNIFICANT CHANGE UP (ref 135–145)
SODIUM SERPL-SCNC: 142 MMOL/L — SIGNIFICANT CHANGE UP (ref 135–145)
WBC # BLD: 10.18 K/UL — SIGNIFICANT CHANGE UP (ref 3.8–10.5)
WBC # BLD: 11.66 K/UL — HIGH (ref 3.8–10.5)
WBC # FLD AUTO: 10.18 K/UL — SIGNIFICANT CHANGE UP (ref 3.8–10.5)
WBC # FLD AUTO: 11.66 K/UL — HIGH (ref 3.8–10.5)

## 2019-12-18 PROCEDURE — 70450 CT HEAD/BRAIN W/O DYE: CPT | Mod: 26

## 2019-12-18 PROCEDURE — 71045 X-RAY EXAM CHEST 1 VIEW: CPT | Mod: 26

## 2019-12-18 PROCEDURE — 99291 CRITICAL CARE FIRST HOUR: CPT

## 2019-12-18 PROCEDURE — 93010 ELECTROCARDIOGRAM REPORT: CPT

## 2019-12-18 PROCEDURE — 99223 1ST HOSP IP/OBS HIGH 75: CPT

## 2019-12-18 RX ORDER — ENOXAPARIN SODIUM 100 MG/ML
90 INJECTION SUBCUTANEOUS ONCE
Refills: 0 | Status: DISCONTINUED | OUTPATIENT
Start: 2019-12-18 | End: 2019-12-24

## 2019-12-18 RX ORDER — DILTIAZEM HCL 120 MG
10 CAPSULE, EXT RELEASE 24 HR ORAL ONCE
Refills: 0 | Status: DISCONTINUED | OUTPATIENT
Start: 2019-12-18 | End: 2019-12-19

## 2019-12-18 RX ORDER — ACETAMINOPHEN 500 MG
1000 TABLET ORAL ONCE
Refills: 0 | Status: DISCONTINUED | OUTPATIENT
Start: 2019-12-18 | End: 2019-12-24

## 2019-12-18 RX ORDER — HYDROCHLOROTHIAZIDE 25 MG
12.5 TABLET ORAL DAILY
Refills: 0 | Status: DISCONTINUED | OUTPATIENT
Start: 2019-12-18 | End: 2019-12-24

## 2019-12-18 RX ORDER — LABETALOL HCL 100 MG
200 TABLET ORAL
Refills: 0 | Status: DISCONTINUED | OUTPATIENT
Start: 2019-12-18 | End: 2019-12-24

## 2019-12-18 RX ORDER — POTASSIUM CHLORIDE 20 MEQ
20 PACKET (EA) ORAL ONCE
Refills: 0 | Status: DISCONTINUED | OUTPATIENT
Start: 2019-12-18 | End: 2019-12-24

## 2019-12-18 RX ORDER — LOSARTAN POTASSIUM 100 MG/1
100 TABLET, FILM COATED ORAL DAILY
Refills: 0 | Status: DISCONTINUED | OUTPATIENT
Start: 2019-12-18 | End: 2019-12-24

## 2019-12-18 RX ORDER — INSULIN LISPRO 100/ML
8 VIAL (ML) SUBCUTANEOUS
Refills: 0 | Status: DISCONTINUED | OUTPATIENT
Start: 2019-12-18 | End: 2019-12-18

## 2019-12-18 RX ORDER — PIPERACILLIN AND TAZOBACTAM 4; .5 G/20ML; G/20ML
3.38 INJECTION, POWDER, LYOPHILIZED, FOR SOLUTION INTRAVENOUS ONCE
Refills: 0 | Status: DISCONTINUED | OUTPATIENT
Start: 2019-12-18 | End: 2019-12-19

## 2019-12-18 RX ORDER — INSULIN LISPRO 100/ML
10 VIAL (ML) SUBCUTANEOUS
Refills: 0 | Status: DISCONTINUED | OUTPATIENT
Start: 2019-12-18 | End: 2019-12-19

## 2019-12-18 RX ORDER — POTASSIUM CHLORIDE 20 MEQ
10 PACKET (EA) ORAL
Refills: 0 | Status: COMPLETED | OUTPATIENT
Start: 2019-12-18 | End: 2019-12-18

## 2019-12-18 RX ORDER — PIPERACILLIN AND TAZOBACTAM 4; .5 G/20ML; G/20ML
3.38 INJECTION, POWDER, LYOPHILIZED, FOR SOLUTION INTRAVENOUS EVERY 8 HOURS
Refills: 0 | Status: DISCONTINUED | OUTPATIENT
Start: 2019-12-18 | End: 2019-12-19

## 2019-12-18 RX ADMIN — Medication 3 MILLILITER(S): at 20:37

## 2019-12-18 RX ADMIN — Medication 3 MILLILITER(S): at 05:58

## 2019-12-18 RX ADMIN — Medication 100 MILLIEQUIVALENT(S): at 21:57

## 2019-12-18 RX ADMIN — Medication 4: at 09:34

## 2019-12-18 RX ADMIN — HEPARIN SODIUM 5000 UNIT(S): 5000 INJECTION INTRAVENOUS; SUBCUTANEOUS at 05:59

## 2019-12-18 RX ADMIN — Medication 1 TABLET(S): at 13:11

## 2019-12-18 RX ADMIN — LORATADINE 10 MILLIGRAM(S): 10 TABLET ORAL at 13:12

## 2019-12-18 RX ADMIN — Medication 4: at 13:12

## 2019-12-18 RX ADMIN — OXYMETAZOLINE HYDROCHLORIDE 1 SPRAY(S): 0.5 SPRAY NASAL at 06:00

## 2019-12-18 RX ADMIN — Medication 8 UNIT(S): at 13:13

## 2019-12-18 RX ADMIN — Medication 1 TABLET(S): at 17:28

## 2019-12-18 RX ADMIN — HEPARIN SODIUM 5000 UNIT(S): 5000 INJECTION INTRAVENOUS; SUBCUTANEOUS at 17:29

## 2019-12-18 RX ADMIN — Medication 0.5 MILLIGRAM(S): at 17:28

## 2019-12-18 RX ADMIN — Medication 3 MILLILITER(S): at 13:12

## 2019-12-18 RX ADMIN — Medication 30 MILLIGRAM(S): at 06:00

## 2019-12-18 RX ADMIN — Medication 1 SPRAY(S): at 05:59

## 2019-12-18 RX ADMIN — Medication 30 MILLIGRAM(S): at 17:30

## 2019-12-18 RX ADMIN — Medication 1 TABLET(S): at 05:59

## 2019-12-18 RX ADMIN — Medication 0.5 MILLIGRAM(S): at 05:59

## 2019-12-18 RX ADMIN — Medication 10 UNIT(S): at 17:28

## 2019-12-18 RX ADMIN — Medication 50 MILLIGRAM(S): at 06:00

## 2019-12-18 RX ADMIN — POLYETHYLENE GLYCOL 3350 17 GRAM(S): 17 POWDER, FOR SOLUTION ORAL at 13:12

## 2019-12-18 RX ADMIN — Medication 3 MILLILITER(S): at 17:28

## 2019-12-18 RX ADMIN — INSULIN GLARGINE 56 UNIT(S): 100 INJECTION, SOLUTION SUBCUTANEOUS at 23:20

## 2019-12-18 RX ADMIN — Medication 4: at 17:28

## 2019-12-18 RX ADMIN — Medication 100 MILLIEQUIVALENT(S): at 20:37

## 2019-12-18 RX ADMIN — Medication 2000 UNIT(S): at 13:11

## 2019-12-18 RX ADMIN — Medication 4 UNIT(S): at 09:34

## 2019-12-18 RX ADMIN — Medication 200 MILLIGRAM(S): at 17:39

## 2019-12-18 RX ADMIN — Medication 3 MILLILITER(S): at 00:18

## 2019-12-18 RX ADMIN — Medication 100 MILLIEQUIVALENT(S): at 22:30

## 2019-12-18 NOTE — CHART NOTE - NSCHARTNOTEFT_GEN_A_CORE
83 yo male PMhx DM on insulin, HTN, BPH, HLD p/w generalized weakness and inability to ambulate after a fall, congestion with GRANT     ICU Vital Signs Last 24 Hrs  T(C): 37 (18 Dec 2019 12:18), Max: 37 (18 Dec 2019 12:18)  T(F): 98.6 (18 Dec 2019 12:18), Max: 98.6 (18 Dec 2019 12:18)  HR: 105 (18 Dec 2019 12:18) (102 - 108)  BP: 175/89 (18 Dec 2019 12:18) (163/97 - 175/97)  BP(mean): --  ABP: --  ABP(mean): --  RR: 18 (18 Dec 2019 12:18) (18 - 18)  SpO2: 95% (18 Dec 2019 12:18) (95% - 97%)      Family at bedside report change ini 83 yo male PMhx DM on insulin, HTN, BPH, HLD p/w generalized weakness and inability to ambulate after a fall, congestion with GRANT     ICU Vital Signs Last 24 Hrs  T(C): 37 (18 Dec 2019 12:18), Max: 37 (18 Dec 2019 12:18)  T(F): 98.6 (18 Dec 2019 12:18), Max: 98.6 (18 Dec 2019 12:18)  HR: 105 (18 Dec 2019 12:18) (102 - 108)  BP: 175/89 (18 Dec 2019 12:18) (163/97 - 175/97)  BP(mean): --  ABP: --  ABP(mean): --  RR: 18 (18 Dec 2019 12:18) (18 - 18)  SpO2: 95% (18 Dec 2019 12:18) (95% - 97%)      Family at bedside reports change in Mental status which is different than his baseline.    Seen and examined patient  Report Reid is president  Year 1953 and he is in his friend house.    NO chest pain, SOB, GRANT, PND, orthopnea, palpitations, diaphoresis, lightheadedness, dizziness, syncope, increased lowr extremity edema, fever chills, malaise, myalgias, anorexia,  generalized fatigue abdominal pain, N/V/C/D BRBPR, melena, urinary symptoms, cough, and wheezing.    Called Dr. Rodríguez reviewed chart and change in mental status    Plan   Head CT urgent non contrast   Psych consult      Family updated and agree with plan.      Will continue to monitor     Internal medicine   Carlita Rebollar NP

## 2019-12-18 NOTE — DISCHARGE NOTE PROVIDER - NSDCFUSCHEDAPPT_GEN_ALL_CORE_FT
HOLLIE STEVENS ; 12/19/2019 ; NPP OrthoSurg [address] [facility]  HOLLIE STEVENS ; 01/14/2020 ; NPP Cardio [address] [facility] HOLLIE STEVENS ; 01/14/2020 ; NPP Cardio [address] HOLLIE STEVENS ; 01/14/2020 ; NPP Cardio [address] [facility] HOLLIE STEVENS ; 01/14/2020 ; NPP Cardio [facility] HOLLIE STEVENS ; 01/14/2020 ; NPP Cardio [facility] HOLLIE STEVENS ; 01/14/2020 ; NPP Cardio [facility] HOLLIE STEVENS ; 01/14/2020 ; NPP Cardio [address]

## 2019-12-18 NOTE — RAPID RESPONSE TEAM SUMMARY - NSOTHERINTERVENTIONSRRT_GEN_ALL_CORE
CT head (P) pt non focal at this time if confusion persistant may need MRI, however the patient may be deliriouse from infectiouse process.   Please follow up with Cardiologist / medical attending for possible TTE / or AC for new onset A fib   Chads Score 4   The patient to be transffered to telemetry   If persistat a fib may concider additional cardizem 30 mg q 6 hold for SBP <120 or hrt rate <50  Please follow up with Speach and swallow - Aspiration precautions   PT/OT evaluation and treatment   Please follow up with all blood work and cx please consider continued IV ABT

## 2019-12-18 NOTE — BEHAVIORAL HEALTH ASSESSMENT NOTE - NSBHCHARTREVIEWIMAGING_PSY_A_CORE FT
< from: CT Head No Cont (12.15.19 @ 17:19) >    PROCEDURE DATE:  12/15/2019            INTERPRETATION:HISTORY: Trauma. Back pain status post fall.    COMPARISON: CT head from 11/20/2019.    TECHNIQUE: Axial noncontrast CT images of the head and cervical spine   were obtained and submitted for interpretation. Sagittal and coronal   reformatted images of the cervical spine were provided. Bone and soft   tissue windows were evaluated.    FINDINGS:     CT BRAIN:   There is no acute intracranial mass-effect, hemorrhage, midline shift, or   abnormal extra-axial fluid collection.   Basal cisterns are patent.   The ventricles, and sulci are prominent consistent with mild to moderate   volume loss.   Patchy hypodensities in the periventricular white matter is consistent   with mild chronic microvascular ischemic changes.  Paranasal sinuses and mastoidair cells are clear. The calvarium is   intact. Small right frontal scalp hematoma which is significantly   decreased in size compared to prior CT head from 11/28/2019.    < end of copied text >

## 2019-12-18 NOTE — CONSULT NOTE ADULT - ASSESSMENT
Assessment  DMT2: 84y Male with DM T2 with hyperglycemia, A1C 7.9%, was on oral meds and insulin at home, now on basal bolus insulin, blood sugars running high and not at target, no hypoglycemic episode. Patient is eating meals with good appetite as per RN, appears confused.  URI: on management, stable, monitored.  Frequent Falls: On fall precautions, PT eval.  HTN: Controlled,  on antihypertensive medications.  HLD: Controlled, on statin.          Gentry Cole MD  Cell: [phone]  Office: [phone] Assessment  DMT2: 84y Male with DM T2 with hyperglycemia, A1C 7.9%, was on oral meds and insulin at home, now on basal bolus insulin, blood sugars running high and not at target,  no hypoglycemic episode. Patient is eating meals with good appetite as per RN, appears confused.  URI: on management, stable, monitored.  Frequent Falls: On fall precautions, PT eval.  HTN: Controlled,  on antihypertensive medications.  HLD: Controlled, on statin.          Gentry Cole MD  Cell: [phone]  Office: [phone]

## 2019-12-18 NOTE — SWALLOW BEDSIDE ASSESSMENT ADULT - SWALLOW EVAL: DIAGNOSIS
Pt seen for evaluation of oropharyngeal swallow function s/p team reporting pt not tolerating PO diet/coughing. Family at bedside reporting pt with intermittent coughing at baseline prior to hospitalization, which is exacerbated with liquids. Today, patient presented with 1) oropharyngeal dysphagia characterized suspected incoordination of respiration and swallowing and s/s suggestive of laryngeal penetration/aspiration 2) Impaired cognition

## 2019-12-18 NOTE — SWALLOW BEDSIDE ASSESSMENT ADULT - COMMENTS
Updated History: Per H&P dx: 1) Generalized weakness. Likely due to recent fall and respiratory infection-afebrile due to using Ibuprofen 800mg ATC for back and leg pain. 2) Frequent Falls: This time, fall was due to generalized weakness and slipped OOB, possibly due to recent respiratory illness. 3) Acute respiratory failure: unspecified whether with hypoxia or hypercapnia. Plan: HR >100, RR 26, but not hypoxic, clinically appears some distress with increase WOB; Suspect viral respiratory infection with significant brochospasm. Cardio following: Airways sound tight, but patient is not grossly volume overloaded. No evidence of recent ACS or decompensated heart failure. Per IM: pt w/ congestion/wheezing. +fevers. Per pulm (12/16), The patient describes chronic nasal congestion which is somewhat worse than usual associated with rhinorrhea and a post nasal drip. He has chest congestion and wheeze without cough or sputum production. He is not short of breath sitting in the chair on room air. He has low grade fevers without chills or sweats. No chest pain/pressure or palpitations. Pulm assessment: less rhinorrhea, post-nasal drip, nasal congestion ->URI/sinusitis although RVP is negative. Bronchospasm- improved, likely obstructive sleep apnea. Recurrent falls with mild elevation in CPK. No SOB, no cough, or sputum production. F/u with pulm: +confusion and leukocytosis likely related to systemic steroids. ID following for fevers: Possible URI given congestion with post-nasal drip, pt with wheezing.  Started on steroids and empiric abx.  No consolidations on CT imaging.   Chest x-ray (12/16): FINDINGS:  The lungs are clear. No pleural effusions or pneumothoraces. The cardiomediastinal silhouette cannot be accurately assessed on the current projection. Degenerative changes of the spine.  IMPRESSION: Clear lungs.  Now elevated WBC

## 2019-12-18 NOTE — BEHAVIORAL HEALTH ASSESSMENT NOTE - HPI (INCLUDE ILLNESS QUALITY, SEVERITY, DURATION, TIMING, CONTEXT, MODIFYING FACTORS, ASSOCIATED SIGNS AND SYMPTOMS)
This is a 84 year-old AM patient, , domiciled with wife, no PPHx, no psychiatric hospitalizations or treatment, no history of substance or alcohol use, PMHx of DM on insulin, HTN, BPH, HLD, admitted on 12/15 for an unwitnessed fall, now evaluated for altered mental status and confusion.    Patient came to the ED on 12/15 s/p unwitnessed fall, had a prior fall on  for which he was evaluated in ED and discharged home. Patient states that he is at the hospital because he fell on , cannot recall more recent fall that resulted in current admission. Patient oriented to self only, able to give his name and , states date is 1955, president is Reid, states he is currently at a restaurant in [place]. Patient cannot provide details regarding past medical or psychiatric history. States that he has been sleeping well, has no pain, is not feeling sad or anxious, denies SI/SA.     Patient's wife and son at bedside interviewed with patient's permission. Patient's wife indicates that patient has been somewhat confused since his admission but became much more disoriented today, not at his cognitive baseline. He has not been agitated or anxious, appears to be calm but disoriented. She states that he has always had good memory prior to this hospitalization, no prior evidence of dementia, normally able to name all presidents and candidates, follows the stock market actively, performs all ADLs and IADLs independently. States that patient has no history of depression or anxiety, no history of SI/SA, no history of hallucinations, delusions, or paranoia, no history of insomnia. States that patient has not shown signs of visual or auditory hallucinations during current episode of confusion. Patient has never been in outpatient psychiatric treatment, never took any psychotropic medications, no history of alcohol or substance use. Patient is retired, , lives with wife. Wife denied any family history of psychiatric or neurological conditions.

## 2019-12-18 NOTE — PROGRESS NOTE ADULT - ASSESSMENT
ASSESSMENT:    confusion and leukocytosis likely related to systemic steroids    rhinorrhea, post-nasal drip, nasal congestion -> URI/sinusitis although RVP is negative -> improved    bronchospasm -> resolved    likely obstructive sleep apnea    recurrent falls with mild elevation in CPK    HTN/HLD/DM    PLAN/RECOMMENDATIONS:    stable oxygenation on room air  augmentin 500mg 2 times daily  discontinue systemic steroids  albuterol/atrovent/pulmicort nebs  flonase/Afrin/claritin/sudafed  DVT prophylaxis  glucose control  out of bed and into the chair  physical therapy evaluation noted  outpatient balance and strengthening training  flomax  cardiology evaluation noted    Will follow with you. Plan of care discussed with the patient at bedside and the dedicated floor NP.    Ignacio Rollins MD, [facility]  [phone]  Pulmonary Medicine

## 2019-12-18 NOTE — PROGRESS NOTE ADULT - SUBJECTIVE AND OBJECTIVE BOX
[facility] - PROGRESS NOTE    CHIEF COMPLAINT: sinusitis; bronchospasm; rhinorrhea; nasal congestion; post nasal drip; s/p fall    INTERVAL HISTORY: quite confused - climbing out of bed - visual hallucinations; much less rhinorrhea, nasal congestion and post-nasal drip; no shortness of breath on room air; no cough or sputum production; decreased chest congestion and wheeze; no fevers, chills or sweats; no chest pain/pressure or palpitations; out of bed and into the chair; noticed to have a "shuffling gait" when walking;    REVIEW OF SYSTEMS:  Constitutional: As per interval history  HEENT: As per interval history  CV: As per interval history  Resp: As per interval history  GI: Within normal limits   : Within normal limits  Musculoskeletal: Within normal limits  Skin: Within normal limits  Neurological: Within normal limits  Psychiatric: confusion  Endocrine: Within normal limits  Hematologic/Lymphatic: Within normal limits  Allergic/Immunologic: Within normal limits    MEDICATIONS:     Pulmonary "  albuterol/ipratropium for Nebulization 3 milliLiter(s) Nebulizer every 6 hours  buDESOnide    Inhalation Suspension 0.5 milliGRAM(s) Inhalation two times a day  loratadine 10 milliGRAM(s) Oral daily  pseudoephedrine 30 milliGRAM(s) Oral two times a day    Anti-microbials:  amoxicillin  500 milliGRAM(s)/clavulanate 1 Tablet(s) Oral two times a day    Cardiovascular:  tamsulosin 0.4 milliGRAM(s) Oral at bedtime    Other:  cholecalciferol 2000 Unit(s) Oral daily  dextrose 5%. 1000 milliLiter(s) IV Continuous <Continuous>  dextrose 50% Injectable 12.5 Gram(s) IV Push once  dextrose 50% Injectable 25 Gram(s) IV Push once  dextrose 50% Injectable 25 Gram(s) IV Push once  fluticasone propionate 50 MICROgram(s)/spray Nasal Spray 1 Spray(s) Both Nostrils two times a day  heparin  Injectable 5000 Unit(s) SubCutaneous every 12 hours  insulin glargine Injectable (LANTUS) 56 Unit(s) SubCutaneous at bedtime  insulin lispro (HumaLOG) corrective regimen sliding scale   SubCutaneous three times a day before meals  insulin lispro (HumaLOG) corrective regimen sliding scale   SubCutaneous at bedtime  insulin lispro Injectable (HumaLOG) 4 Unit(s) SubCutaneous three times a day before meals  multivitamin/minerals 1 Tablet(s) Oral daily  oxymetazoline 0.05% Nasal Spray 1 Spray(s) Both Nostrils two times a day  polyethylene glycol 3350 17 Gram(s) Oral daily    MEDICATIONS  (PRN):  artificial tears (preservative free) Ophthalmic Solution 1 Drop(s) Both EYES four times a day PRN Dry Eyes  dextrose 40% Gel 15 Gram(s) Oral once PRN Blood Glucose LESS THAN 70 milliGRAM(s)/deciliter  glucagon  Injectable 1 milliGRAM(s) IntraMuscular once PRN Glucose LESS THAN 70 milligrams/deciliter        OBJECTIVE:    I&O's Detail    17 Dec 2019 07:01  -  18 Dec 2019 07:00  --------------------------------------------------------  IN:  Total IN: 0 mL    OUT:    Voided: 500 mL  Total OUT: 500 mL    Total NET: -500 mL    POCT Blood Glucose.: 231 mg/dL (18 Dec 2019 08:36)  POCT Blood Glucose.: 206 mg/dL (17 Dec 2019 22:39)  POCT Blood Glucose.: 247 mg/dL (17 Dec 2019 21:29)  POCT Blood Glucose.: 270 mg/dL (17 Dec 2019 17:32)  POCT Blood Glucose.: 329 mg/dL (17 Dec 2019 12:14)      PHYSICAL EXAM:       ICU Vital Signs Last 24 Hrs  T(C): 36.7 (18 Dec 2019 04:18), Max: 37.1 (17 Dec 2019 14:12)  T(F): 98 (18 Dec 2019 04:18), Max: 98.7 (17 Dec 2019 14:12)  HR: 108 (18 Dec 2019 04:18) (102 - 111)  BP: 175/97 (18 Dec 2019 04:18) (163/97 - 175/97)  BP(mean): --  ABP: --  ABP(mean): --  RR: 18 (18 Dec 2019 04:18) (18 - 18)  SpO2: 96% (18 Dec 2019 04:18) (96% - 97%) on room air     General: Awake. Alert. Cooperative. No distress. Appears stated age. Eating breakfast with difficulty.	  HEENT: Atraumatic. Normocephalic. Anicteric. Normal oral mucosa. PERRL. EOMI. Decreased nasal mucosal injection and erythema.  Neck: Supple. Trachea midline. Thyroid without enlargement/tenderness/nodules. No carotid bruit. No JVD. Short and wide  Cardiovascular: Regular rate and rhythm. S1 S2 normal. No murmurs, rubs or gallops.  Respiratory: Respirations unlabored. No wheeze. No curvature.  Abdomen: Soft. Non-tender. Non-distended. No organomegaly. No masses. Normal bowel sounds. Obese.  Extremities: Warm to touch. No clubbing or cyanosis. No pedal edema.  Pulses: 2+ peripheral pulses all extremities.	  Skin: Ecchymoses around right eye and on right chin/neck. Hematoma on the top of the right sided of the head.  Lymph Nodes: Cervical, supraclavicular and axillary nodes normal  Neurological: Motor and sensory examination equal and normal. A and O x 2  Psychiatry: Confused.    LABS:                          14.0   11.66 )-----------( 237      ( 18 Dec 2019 09:31 )             42.6     CBC    WBC  11.66 <==, 5.82 <==, 5.62 <==, 4.58 <==    Hemoglobin  14.0 <<==, 12.2 <<==, 11.7 <<==, 12.3 <<==    Hematocrit  42.6 <==, 37.3 <==, 35.7 <==, 37.6 <==    Platelets  237 <==, 186 <==, 180 <==, 164 <==      142  |  104  |  21  ----------------------------<  269<H>    12-18  3.4<L>   |  21<L>  |  1.04      LYTES    sodium  142 <==, 142 <==, 143 <==, 142 <==    potassium   3.4 <==, 3.5 <==, 3.6 <==, 4.3 <==    chloride  104 <==, 103 <==, 103 <==, 103 <==    carbon dioxide  21 <==, 21 <==, 19 <==, 22 <==    =============================================================================================  RENAL FUNCTION:    Creatinine:   1.04  <<==, 1.03  <<==, 1.17  <<==, 1.21  <<==    BUN:   21 <==, 23 <==, 27 <==, 32 <==    ============================================================================================    calcium   8.7 <==, 8.6 <==, 8.4 <==, 8.7 <==    mag   1.9 <==, 1.5 <==    ============================================================================================  LFTs    AST:   40 <==     ALT:  31  <==     AP:  58  <=    Bili:  0.4  <=    PT/INR - ( 15 Dec 2019 15:36 )   PT: 13.7 sec;   INR: 1.20 ratio      PTT - ( 15 Dec 2019 15:36 )  PTT: 22.0 sec    Venous Blood Gas:  12-15 @ 15:36  7.42/40/39/25/74  VBG Lactate: 1.9    CARDIAC MARKERS ( 15 Dec 2019 20:49 )   U/L /CKMB x     /CKMB Units x        troponin x        CARDIAC MARKERS ( 15 Dec 2019 15:36 )   U/L /CKMB x     /CKMB Units x        troponin x          MICROBIOLOGY:     Rapid Respiratory Viral Panel (19 @ 10:27)    Rapid RVP Result: NotDete: This Respiratory Panel uses polymerase chain reaction (PCR) to detect for  adenovirus; coronavirus (HKU1, NL63, 229E, OC43); human metapneumovirus  (hMPV); human enterovirus/rhinovirus (Entero/RV); influenza A; influenza  A/H1; influenza A/H3; influenza A/H1-2009; influenza B; parainfluenza  viruses 1, 2, 3, 4; respiratory syncytial virus; Mycoplasma pneumoniae;  and Chlamydophila pneumoniae.    Urinalysis Basic - ( 15 Dec 2019 18:05 )    Color: Yellow / Appearance: Clear / S.029 / pH: x  Gluc: x / Ketone: Small  / Bili: Negative / Urobili: Negative   Blood: x / Protein: 30 mg/dL / Nitrite: Negative   Leuk Esterase: Negative / RBC: 4 /hpf / WBC 3 /HPF   Sq Epi: x / Non Sq Epi: 2 /hpf / Bacteria: Negative    Culture - Urine (12.15.19 @ 22:11)    Specimen Source: .Urine Clean Catch (Midstream)    Culture Results:   Aerococcus species  "Aerococcus spp. are predictably susceptible to penicillin,  ampicillin, tetracycline, and vancomycin.  All isolates are  resistant to sulfonamides"  <10,000 CFU/ml Normal Urogenital pema present    RADIOLOGY:  [x] Chest radiographs reviewed and interpreted by me    EXAM:  XR CHEST AP OR PA 1V                          PROCEDURE DATE:  12/15/2019      INTERPRETATION:  CLINICAL INFORMATION: Cough and fever.    EXAM: AP chest radiograph    COMPARISON: None    FINDINGS:  The heart is normal in size. Calcified aortic knob. No focal   consolidations. No pleural effusion or pneumothorax.  The visualized osseous structures demonstrate no acute pathology.    IMPRESSION:  Clear lungs    KATHY SNOW M.D., RADIOLOGY RESIDENT  This document has been electronically signed.  CAITIE MEJIAS M.D., ATTENDING RADIOLOGIST  This document has been electronically signed. Dec 16 2019  9:38AM  ---------------------------------------------------------------------------------------------------------------    EXAM:  CT ABDOMEN AND PELVIS IC                          EXAM:  CT CHEST IC                          PROCEDURE DATE:  12/15/2019      INTERPRETATION:  CLINICAL INFORMATION: Trauma. Status post fall with   diffuse abdominal tenderness.    COMPARISON: None.    PROCEDURE:   CT of the Chest, Abdomen and Pelvis was performed with intravenous   contrast.   Imaging was performed through the chest in the arterial phase followed by   imaging of the abdomen and pelvis in the portal venous phase.  Intravenous contrast: 90 ml Omnipaque 350. 10 ml discarded.  Oral contrast: None.  Sagittal and coronal reformats were performed.    FINDINGS:    CHEST:     LUNGS AND LARGE AIRWAYS: Patent central airways. No consolidation.  PLEURA: No pleural effusion. No pneumothorax.  VESSELS: Atherosclerotic changes of the aorta and coronary arteries.  HEART: Heart size is normal. No pericardial effusion. Mitral annulus and   aortic valve calcifications.  MEDIASTINUM AND KARLA: No lymphadenopathy.  CHEST WALL AND LOWER NECK: Within normal limits.    ABDOMEN AND PELVIS:    LIVER: Steatosis.  BILE DUCTS: Normal caliber.  GALLBLADDER: Within normal limits.  SPLEEN: Within normal limits.  PANCREAS: Within normal limits.  ADRENALS: 7 mm indeterminate left adrenal nodule.. Right adrenal gland is   unremarkable..  KIDNEYS/URETERS: No renal stones or hydronephrosis. Right upper pole   exophytic complex cystic lesion with irregular thick mural thickening..   Bilateral renal subcentimeter hypodensities which are too small to   characterize. Punctate 2 mm nonobstructing calculus in the midpole of the   left kidney.    BLADDER: 5 cm left and 1 cm right posterior bladder diverticuli.    REPRODUCTIVE ORGANS: Prostate within normal limits.    BOWEL: No bowel obstruction. Appendix is normal.  PERITONEUM: Trace fluid in the right paracolic gutter. No   pneumoperitoneum.  VESSELS: Atherosclerotic changes.  RETROPERITONEUM/LYMPH NODES: No lymphadenopathy.    ABDOMINAL WALL: Small degree of ventral abdominal wall subcutaneous   stranding and skin thickening, left greater the right, likely related to   traumatic contusion. No discrete hematoma. Small bilateral fat-containing   inguinal hernias..  BONES: No acute fracture or dislocation. Degenerative changes.    IMPRESSION:   Trace amount of free fluid in the right paracolic gutter.    No visceral injury is identified.    Ventral abdominal wall subcutaneous stranding and skin thickening, left   greater the right, likely related to traumatic contusion. No discrete   hematoma.     Complex exophytic right renal cystic lesion and indeterminate left   adrenal nodule. Further characterization with nonemergent contrast   enhanced MRI is recommended.    ROULA BACH M.D., RADIOLOGY RESIDENT  This document has been electronically signed.  MARK GUNDERSON M.D., ATTENDING RADIOLOGIST  This document has been electronically signed. Dec 15 2019  6:35PM  ---------------------------------------------------------------------------------------------------------------

## 2019-12-18 NOTE — BEHAVIORAL HEALTH ASSESSMENT NOTE - NSBHCHARTREVIEWVS_PSY_A_CORE FT
Vital Signs Last 24 Hrs  T(C): 37 (18 Dec 2019 12:18), Max: 37 (18 Dec 2019 12:18)  T(F): 98.6 (18 Dec 2019 12:18), Max: 98.6 (18 Dec 2019 12:18)  HR: 105 (18 Dec 2019 12:18) (102 - 108)  BP: 175/89 (18 Dec 2019 12:18) (163/97 - 175/97)  BP(mean): --  RR: 18 (18 Dec 2019 12:18) (18 - 18)  SpO2: 95% (18 Dec 2019 12:18) (95% - 97%)

## 2019-12-18 NOTE — SWALLOW BEDSIDE ASSESSMENT ADULT - PHARYNGEAL PHASE
Decreased laryngeal elevation upon palpation, Decreased laryngeal elevation upon palpation Significant cough episode post 1 single cup sip, decreased laryngeal elevation upon palpation/Cough post oral intake

## 2019-12-18 NOTE — PROGRESS NOTE ADULT - PROBLEM SELECTOR PLAN 5
- will hold HCTZ and Labetalol for now  - will treat with ARB with holding parameters bp elevated  restart home meds   cards follow up

## 2019-12-18 NOTE — BEHAVIORAL HEALTH ASSESSMENT NOTE - NSBHCHARTREVIEWLAB_PSY_A_CORE FT
14.0   11.66 )-----------( 237      ( 18 Dec 2019 09:31 )             42.6   12-18    142  |  104  |  21  ----------------------------<  269<H>  3.4<L>   |  21<L>  |  1.04    Ca    8.7      18 Dec 2019 09:29    Culture - Urine (12.15.19 @ 22:11)    Specimen Source: .Urine Clean Catch (Midstream)    Culture Results:   Aerococcus species  "Aerococcus spp. are predictably susceptible to penicillin,  ampicillin, tetracycline, and vancomycin.  All isolates are  resistant to sulfonamides"  <10,000 CFU/ml Normal Urogenital pema present

## 2019-12-18 NOTE — BEHAVIORAL HEALTH ASSESSMENT NOTE - ORIENTATION OTHER
states date is March 26 1955, president is Reid, states he is currently at a restaurant in [facility]

## 2019-12-18 NOTE — BEHAVIORAL HEALTH ASSESSMENT NOTE - SUMMARY
This is a 84 year-old AM patient, , domiciled with wife, no PPHx, no psychiatric hospitalizations or treatment, no history of substance or alcohol use, PMHx of DM on insulin, HTN, BPH, HLD, admitted on 12/15 for an unwitnessed fall, now evaluated for altered mental status and confusion.    Patient became progressively more confused during this hospitalization. He is currently oriented to self only and not at cognitive baseline per family at bedside, no evidence of agitation at the moment. Patient's current presentation consistent with delirium. No evidence of acute neurological dysfunction on head CT on 12/15, but may warrant repeating CT to rule out neuro etiology. Follow-up blood culture, UA. Will start on Haldol for delirium.

## 2019-12-18 NOTE — PROGRESS NOTE ADULT - ASSESSMENT
85 yo male PMhx DM on insulin, HTN, BPH, HLD p/w generalized weakness and inability to ambulate after a fall, congestion with GRANT     congestion/wheezing  likely URI  abx as per ID  nebs  pulm follow up    fevers  id consulted  check blood and urine cult - ngtd  abx as per ID    pt clinically improved  dc planning rehab

## 2019-12-18 NOTE — RAPID RESPONSE TEAM SUMMARY - NSSITUATIONBACKGROUNDRRT_GEN_ALL_CORE
85 yo male PMhx DM on insulin, HTN, BPH, HLD presents to the ED with unwitnessed fall the day prior with inability to get up and ambulate. Found to have (+) URI this admission. Cardiac work  upthis admission has been negative. Today, RRt has been called for SVT Hrt rate in the 150's Rapid a fib -170. Per primary provider the earlier today the patient was noted to have increased confusion.

## 2019-12-18 NOTE — CHART NOTE - NSCHARTNOTEFT_GEN_A_CORE
HPI  85 yo male PMhx DM on insulin, HTN, BPH, HLD presents to the ED with unwitnessed fall the day prior with inability to get up and ambulate. Pt reports he slipped off the bed the day prior onto floor and was unable to get up.  Wife attempted to help him up but was unable, so placed pillow under head and pt slept on floor overnight. This AM pt was still unable to get up so EMS was called. Patient states he uses a cane to ambulate outside the house, but ambulate independently.  Patient had a trip and fall outside the house in [facility], resulting in bruising and hematoma R chin, abd, and R frontal scalp, evaluated here in ED, and discharged home.  Patient has been feeling congested for the past couple days, decrease PO intake, and GRANT.  Patient denies fever (but on Ibuprofen 800 3x daily for back and leg pain), chest pain, cough, LOC, dysuria or abd pain.  No sick contact.  c/o gen weakness and inability to ambulate (15 Dec 2019 22:05)      EKG noted with 's from [facility] RRT called    Patient with AMS prior awaiting head CT and psych consult. Now presents with 's. Patient seen and examined  Sammy MG   Appears(+) tachypnea Placed on NC.    Rectal temp noted at 100.4     Medications  cardizem 10 mg x 1 IVP   IV tylenol   Piptazo 3.375   Solumedrol 40mg x1 IV   duoneb x 1      BMP, MG, Phos VBG RVP Blood cx x 2 Ua and Urine Cx ordered   awaiting head CT     ICU Vital Signs Last 24 Hrs  T(C): 37.1 (18 Dec 2019 19:37), Max: 37.1 (18 Dec 2019 19:37)  T(F): 98.8 (18 Dec 2019 19:37), Max: 98.8 (18 Dec 2019 19:37)  HR: 101 (18 Dec 2019 19:37) (101 - 174)  BP: 133/70 (18 Dec 2019 19:37) (130/84 - 175/97)  RR: 30 (18 Dec 2019 19:37) (18 - 30)  SpO2: 98% (18 Dec 2019 19:37) (95% - 98%)          Dr Rodríguez called and made aware of all innervations and upgraded telemetry services on 4 Mac[place]  Report given to  ACP team Provider Brad      Will continue to monitor    Carlita Rebollar NP  Internal Medicine

## 2019-12-18 NOTE — RAPID RESPONSE TEAM SUMMARY - NSOTHERSPECIFYRRT2_GEN_ALL_CORE
Blood cx UA Urine cx Chest x ray cbc cmp mag and phos Blood cx UA Urine cx Chest x ray cbc cmp mag and phos RVP

## 2019-12-18 NOTE — PROGRESS NOTE ADULT - SUBJECTIVE AND OBJECTIVE BOX
HOLLIE STEVENS  84y Male  MRN:[id number]    Patient is a 84y old  Male who presents with a chief complaint of s/p fall (15 Dec 2019 22:05)    HPI:  83 yo male PMhx DM on insulin, HTN, BPH, HLD presents to the ED with unwitnessed fall the day prior with inability to get up and ambulate. Pt reports he slipped off the bed the day prior onto floor and was unable to get up.  Wife attempted to help him up but was unable, so placed pillow under head and pt slept on floor overnight. This AM pt was still unable to get up so EMS was called. Patient states he uses a cane to ambulate outside the house, but ambulate independently.  Patient had [place] and [place], resulting in bruising and hematoma R chin, abd, and R frontal scalp, evaluated here in ED, and discharged home.  Patient has been feeling congested for the past couple days, decrease PO intake, and GRANT.  Patient denies fever (but on Ibuprofen 800 3x daily for back and leg pain), chest pain, cough, LOC, dysuria or abd pain.  No sick contact.  c/o gen weakness and inability to ambulate (15 Dec 2019 22:05)      Patient seen and evaluated at bedside. No acute events overnight except as noted.    Interval HPI: no events o/n    PAST MEDICAL & SURGICAL HISTORY:  Diabetes mellitus type 2 in nonobese  BPH (benign prostatic hyperplasia)  Hyperlipidemia  HTN (hypertension)  H/O arthroscopy: R knee      REVIEW OF SYSTEMS:  as per hpi       VITALS:  Vital Signs Last 24 Hrs  T(C): 37 (18 Dec 2019 12:18), Max: 37.1 (17 Dec 2019 14:12)  T(F): 98.6 (18 Dec 2019 12:18), Max: 98.7 (17 Dec 2019 14:12)  HR: 105 (18 Dec 2019 12:18) (102 - 111)  BP: 175/89 (18 Dec 2019 12:18) (163/97 - 175/97)  BP(mean): --  RR: 18 (18 Dec 2019 12:18) (18 - 18)  SpO2: 95% (18 Dec 2019 12:18) (95% - 97%)      PHYSICAL EXAM:  GENERAL: NAD, well-developed  HEAD:  +head trauma, +ecchymosis on face/neck  EYES: EOMI, PERRLA, conjunctiva and sclera clear  NECK: Supple, No JVD  CHEST/LUNG: b/l congestion/wheeze   HEART: S1, S2; No murmurs, rubs, or gallops  ABDOMEN: Soft, Nontender, Nondistended; Bowel sounds present  EXTREMITIES:  2+ Peripheral Pulses, No clubbing, cyanosis, or edema  PSYCH: Normal affect  NEUROLOGY: AAOX3; non-focal  SKIN: No rashes or lesions    Consultant(s) Notes Reviewed:  [x ] YES  [ ] NO  Care Discussed with Consultants/Other Providers [ x] YES  [ ] NO    MEDS:  MEDICATIONS  (STANDING):  albuterol/ipratropium for Nebulization 3 milliLiter(s) Nebulizer every 6 hours  amoxicillin  500 milliGRAM(s)/clavulanate 1 Tablet(s) Oral two times a day  buDESOnide    Inhalation Suspension 0.5 milliGRAM(s) Inhalation two times a day  cholecalciferol 2000 Unit(s) Oral daily  dextrose 5%. 1000 milliLiter(s) (50 mL/Hr) IV Continuous <Continuous>  dextrose 50% Injectable 12.5 Gram(s) IV Push once  dextrose 50% Injectable 25 Gram(s) IV Push once  dextrose 50% Injectable 25 Gram(s) IV Push once  fluticasone propionate 50 MICROgram(s)/spray Nasal Spray 1 Spray(s) Both Nostrils two times a day  heparin  Injectable 5000 Unit(s) SubCutaneous every 12 hours  insulin glargine Injectable (LANTUS) 56 Unit(s) SubCutaneous at bedtime  insulin lispro (HumaLOG) corrective regimen sliding scale   SubCutaneous three times a day before meals  insulin lispro (HumaLOG) corrective regimen sliding scale   SubCutaneous at bedtime  insulin lispro Injectable (HumaLOG) 8 Unit(s) SubCutaneous three times a day before meals  loratadine 10 milliGRAM(s) Oral daily  multivitamin/minerals 1 Tablet(s) Oral daily  oxymetazoline 0.05% Nasal Spray 1 Spray(s) Both Nostrils two times a day  polyethylene glycol 3350 17 Gram(s) Oral daily  potassium chloride   Solution 20 milliEquivalent(s) Oral once  pseudoephedrine 30 milliGRAM(s) Oral two times a day  tamsulosin 0.4 milliGRAM(s) Oral at bedtime    MEDICATIONS  (PRN):  artificial tears (preservative free) Ophthalmic Solution 1 Drop(s) Both EYES four times a day PRN Dry Eyes  dextrose 40% Gel 15 Gram(s) Oral once PRN Blood Glucose LESS THAN 70 milliGRAM(s)/deciliter  glucagon  Injectable 1 milliGRAM(s) IntraMuscular once PRN Glucose LESS THAN 70 milligrams/deciliter      ALLERGIES:  No Known Allergies      LABS:                             14.0   11.66 )-----------( 237      ( 18 Dec 2019 09:31 )             42.6   12-18    142  |  104  |  21  ----------------------------<  269<H>  3.4<L>   |  21<L>  |  1.04    Ca    8.7      18 Dec 2019 09:29        < from: Xray Chest 1 View- PORTABLE-Urgent (12.16.19 @ 12:45) >  IMPRESSION:    Clear lungs.    < end of copied text >         < from: CT Thoracic Spine Reform No Cont (12.15.19 @ 17:20) >    IMPRESSION:   CT BRAIN: No acute intracranial bleeding, mass effect, or shift.   Resolving right frontal scalp hematoma, now small compared to prior CT   head from 11/28/2019.     CT CERVICAL SPINE: No acute fracture subluxation. Multilevel   spondylosis..     CT THORACIC SPINE: No acute fracture subluxation. Multilevel spondylosis.   Increased density along the left posterior lateral aspect of the central   canal at the T6-7 level possibly representing a meningioma. Follow-up   thoracic spine MRI is recommended.  Partially imaged abdomen demonstrates right renal hypodensity and nodular   thickening of the bilateral adrenal glands better evaluated on same day   CT abdomen pelvis.     CT LUMBAR SPINE: No acute fracture subluxation. Multilevel spondylosis.    < end of copied text >

## 2019-12-18 NOTE — CHART NOTE - NSCHARTNOTEFT_GEN_A_CORE
12/18/2019     1859    Critical Care Note--Medicine Attending    See Rapid Response Team sheet for detail, assessment and plan.   I personally provided 35 minutes of critical care time for respiratory distress, fever, tachycardia in this patient UNKNOWN to me previously--admitted S/P fall with treated with Duoneb, antipyretic and empiric IV antibiotics for aspiration pneumonia.  Presumed new onset atrial fibrillation.  Received parenteral diltiazem.   Stable hemodynamics and improved response to treatment.  For transfer to telemetry.   For head CTT for evaluation of earlier delirium.   For cardiology evaluation.  FS checked.   Patient/ adult son / adult in attendance aware of course and agree with plan/care as above.   Emotional support provided to patient/ family.  Care reviewed with primary provider NP/PA.   Care as per PRIMARY PROVIDER.    Nelson Tapia MD  [phone]

## 2019-12-18 NOTE — DISCHARGE NOTE PROVIDER - CARE PROVIDERS DIRECT ADDRESSES
,[email or web address]

## 2019-12-18 NOTE — RAPID RESPONSE TEAM SUMMARY - NSADDTLFINDINGSRRT_GEN_ALL_CORE
Upon arrival the patient was found to be awake and alert some confusion. (+) tachypnea with associated rapid a fib. During this time the patient denies any chest pain jaw pain or arm pain. (+) auditable right mid lobe inspitory wheeze. (+) rectal temp 101.

## 2019-12-18 NOTE — CONSULT NOTE ADULT - SUBJECTIVE AND OBJECTIVE BOX
HPI:  83 yo male PMhx DM on insulin, HTN, BPH, HLD presents to the ED with unwitnessed fall the day prior with inability to get up and ambulate. Pt reports he slipped off the bed the day prior onto floor and was unable to get up.  Wife attempted to help him up but was unable, so placed pillow under head and pt slept on floor overnight. This AM pt was still unable to get up so EMS was called. Patient states he uses a cane to ambulate outside the house, but ambulate independently.  Patient had a trip and fall outside the house in [facility], resulting in bruising and hematoma R chin, abd, and R frontal scalp, evaluated here in ED, and discharged home.  Patient has been feeling congested for the past couple days, decrease PO intake, and GRANT.  Patient denies fever (but on Ibuprofen 800 3x daily for back and leg pain), chest pain, cough, LOC, dysuria or abd pain.  No sick contact.  c/o gen weakness and inability to ambulate (15 Dec 2019 22:05)    Patient has history of diabetes, A1C 7.9% on oral meds and insulin at home (Toujeo 70u at bedtime, Novolog 35/35/45 before meals, Metformin 1000mg BID). Patient follows up with PCP for diabetes management.  Endo was consulted for glycemic control.    PAST MEDICAL & SURGICAL HISTORY:  Diabetes mellitus type 2 in nonobese  BPH (benign prostatic hyperplasia)  Hyperlipidemia  HTN (hypertension)  H/O arthroscopy: R knee      FAMILY HISTORY:  FH: type 2 diabetes: sister      Social History:    Outpatient Medications:    MEDICATIONS  (STANDING):  albuterol/ipratropium for Nebulization 3 milliLiter(s) Nebulizer every 6 hours  amoxicillin  500 milliGRAM(s)/clavulanate 1 Tablet(s) Oral two times a day  buDESOnide    Inhalation Suspension 0.5 milliGRAM(s) Inhalation two times a day  cholecalciferol 2000 Unit(s) Oral daily  dextrose 5%. 1000 milliLiter(s) (50 mL/Hr) IV Continuous <Continuous>  dextrose 50% Injectable 12.5 Gram(s) IV Push once  dextrose 50% Injectable 25 Gram(s) IV Push once  dextrose 50% Injectable 25 Gram(s) IV Push once  fluticasone propionate 50 MICROgram(s)/spray Nasal Spray 1 Spray(s) Both Nostrils two times a day  heparin  Injectable 5000 Unit(s) SubCutaneous every 12 hours  hydrochlorothiazide 12.5 milliGRAM(s) Oral daily  insulin glargine Injectable (LANTUS) 56 Unit(s) SubCutaneous at bedtime  insulin lispro (HumaLOG) corrective regimen sliding scale   SubCutaneous three times a day before meals  insulin lispro (HumaLOG) corrective regimen sliding scale   SubCutaneous at bedtime  insulin lispro Injectable (HumaLOG) 8 Unit(s) SubCutaneous three times a day before meals  labetalol 200 milliGRAM(s) Oral two times a day  loratadine 10 milliGRAM(s) Oral daily  losartan 100 milliGRAM(s) Oral daily  multivitamin/minerals 1 Tablet(s) Oral daily  oxymetazoline 0.05% Nasal Spray 1 Spray(s) Both Nostrils two times a day  polyethylene glycol 3350 17 Gram(s) Oral daily  potassium chloride   Solution 20 milliEquivalent(s) Oral once  pseudoephedrine 30 milliGRAM(s) Oral two times a day  tamsulosin 0.4 milliGRAM(s) Oral at bedtime    MEDICATIONS  (PRN):  artificial tears (preservative free) Ophthalmic Solution 1 Drop(s) Both EYES four times a day PRN Dry Eyes  dextrose 40% Gel 15 Gram(s) Oral once PRN Blood Glucose LESS THAN 70 milliGRAM(s)/deciliter  glucagon  Injectable 1 milliGRAM(s) IntraMuscular once PRN Glucose LESS THAN 70 milligrams/deciliter      Allergies    No Known Allergies    Intolerances      Review of Systems:  Constitutional: No fever, no chills  Eyes: No blurry vision  Neuro: No tremors  HEENT: No pain, no neck swelling  Cardiovascular: No chest pain, no palpitations  Respiratory: Has SOB, no cough  GI: No nausea, vomiting, abdominal pain  : No dysuria  Skin: no rash  MSK: Has leg swelling.  Psych: no depression  Endocrine: no polyuria, polydipsia    ALL OTHER SYSTEMS REVIEWED AND NEGATIVE    UNABLE TO OBTAIN    PHYSICAL EXAM:  VITALS: T(C): 37 (12-18-19 @ 12:18)  T(F): 98.6 (12-18-19 @ 12:18), Max: 98.6 (12-18-19 @ 12:18)  HR: 105 (12-18-19 @ 12:18) (102 - 108)  BP: 175/89 (12-18-19 @ 12:18) (163/97 - 175/97)  RR:  (18 - 18)  SpO2:  (95% - 97%)  Wt(kg): --  GENERAL: NAD, well-groomed, well-developed  EYES: No proptosis, no lid lag  HEENT:  Atraumatic, Normocephalic  THYROID: Normal size, no palpable nodules  RESPIRATORY: Clear to auscultation bilaterally; No rales, rhonchi, wheezing  CARDIOVASCULAR: Si S2, No murmurs;  GI: Soft, non distended, normal bowel sounds  SKIN: Dry, intact, No rashes or lesions  MUSCULOSKELETAL: Has BL lower extremity edema.  NEURO:  no tremor, sensation decreased in feet BL,    POCT Blood Glucose.: 237 mg/dL (12-18-19 @ 13:10)  POCT Blood Glucose.: 215 mg/dL (12-18-19 @ 12:07)  POCT Blood Glucose.: 231 mg/dL (12-18-19 @ 08:36)  POCT Blood Glucose.: 206 mg/dL (12-17-19 @ 22:39)  POCT Blood Glucose.: 247 mg/dL (12-17-19 @ 21:29)  POCT Blood Glucose.: 270 mg/dL (12-17-19 @ 17:32)  POCT Blood Glucose.: 329 mg/dL (12-17-19 @ 12:14)  POCT Blood Glucose.: 235 mg/dL (12-17-19 @ 08:39)  POCT Blood Glucose.: 328 mg/dL (12-16-19 @ 22:01)  POCT Blood Glucose.: 205 mg/dL (12-16-19 @ 18:06)  POCT Blood Glucose.: 204 mg/dL (12-16-19 @ 12:19)  POCT Blood Glucose.: 220 mg/dL (12-16-19 @ 08:39)  POCT Blood Glucose.: 287 mg/dL (12-16-19 @ 03:18)  POCT Blood Glucose.: 238 mg/dL (12-15-19 @ 22:42)                            14.0   11.66 )-----------( 237      ( 18 Dec 2019 09:31 )             42.6       12-18    142  |  104  |  21  ----------------------------<  269<H>  3.4<L>   |  21<L>  |  1.04    EGFR if : 77  EGFR if non : 66    Ca    8.7      12-18  Mg     1.9     12-16    TPro  6.4  /  Alb  3.5  /  TBili  0.4  /  DBili  x   /  AST  40  /  ALT  31  /  AlkPhos  58  12-15      Thyroid Function Tests:      Hemoglobin A1C, Whole Blood: 7.9 % <H> [4.0 - 5.6] (12-16-19 @ 15:52)          Radiology: HPI:  85 yo male PMhx DM on insulin, HTN, BPH, HLD presents to the ED with unwitnessed fall the day prior with inability to get up and ambulate. Pt reports he slipped off the bed the day prior onto floor and was unable to get up.  Wife attempted to help him up but was unable, so placed pillow under head and pt slept on floor overnight. This AM pt was still unable to get up so EMS was called. Patient states he uses a cane to ambulate outside the house, but ambulate independently.  Patient had a trip and fall outside the house in [facility], resulting in bruising and hematoma R chin, abd, and R frontal scalp, evaluated here in ED, and discharged home.  Patient has been feeling congested for the past couple days, decrease PO intake, and GRANT.  Patient denies fever (but on Ibuprofen 800 3x daily for back and leg pain), chest pain, cough, LOC, dysuria or abd pain.  No sick contact.  c/o gen weakness and inability to ambulate (15 Dec 2019 22:05)    Patient has history of diabetes, A1C 7.9% on oral meds and insulin at home (Toujeo 70u at bedtime, Novolog 35/35/45 before meals, Metformin 1000mg BID). Patient follows up with PCP for diabetes management.  Endo was consulted for glycemic control.    PAST MEDICAL & SURGICAL HISTORY:  Diabetes mellitus type 2 in nonobese  BPH (benign prostatic hyperplasia)  Hyperlipidemia  HTN (hypertension)  H/O arthroscopy: R knee      FAMILY HISTORY:  FH: type 2 diabetes: sister      Social History:    Outpatient Medications:    MEDICATIONS  (STANDING):  albuterol/ipratropium for Nebulization 3 milliLiter(s) Nebulizer every 6 hours  amoxicillin  500 milliGRAM(s)/clavulanate 1 Tablet(s) Oral two times a day  buDESOnide    Inhalation Suspension 0.5 milliGRAM(s) Inhalation two times a day  cholecalciferol 2000 Unit(s) Oral daily  dextrose 5%. 1000 milliLiter(s) (50 mL/Hr) IV Continuous <Continuous>  dextrose 50% Injectable 12.5 Gram(s) IV Push once  dextrose 50% Injectable 25 Gram(s) IV Push once  dextrose 50% Injectable 25 Gram(s) IV Push once  fluticasone propionate 50 MICROgram(s)/spray Nasal Spray 1 Spray(s) Both Nostrils two times a day  heparin  Injectable 5000 Unit(s) SubCutaneous every 12 hours  hydrochlorothiazide 12.5 milliGRAM(s) Oral daily  insulin glargine Injectable (LANTUS) 56 Unit(s) SubCutaneous at bedtime  insulin lispro (HumaLOG) corrective regimen sliding scale   SubCutaneous three times a day before meals  insulin lispro (HumaLOG) corrective regimen sliding scale   SubCutaneous at bedtime  insulin lispro Injectable (HumaLOG) 8 Unit(s) SubCutaneous three times a day before meals  labetalol 200 milliGRAM(s) Oral two times a day  loratadine 10 milliGRAM(s) Oral daily  losartan 100 milliGRAM(s) Oral daily  multivitamin/minerals 1 Tablet(s) Oral daily  oxymetazoline 0.05% Nasal Spray 1 Spray(s) Both Nostrils two times a day  polyethylene glycol 3350 17 Gram(s) Oral daily  potassium chloride   Solution 20 milliEquivalent(s) Oral once  pseudoephedrine 30 milliGRAM(s) Oral two times a day  tamsulosin 0.4 milliGRAM(s) Oral at bedtime    MEDICATIONS  (PRN):  artificial tears (preservative free) Ophthalmic Solution 1 Drop(s) Both EYES four times a day PRN Dry Eyes  dextrose 40% Gel 15 Gram(s) Oral once PRN Blood Glucose LESS THAN 70 milliGRAM(s)/deciliter  glucagon  Injectable 1 milliGRAM(s) IntraMuscular once PRN Glucose LESS THAN 70 milligrams/deciliter      Allergies    No Known Allergies    Intolerances      Review of Systems:  Constitutional: No fever, no chills  Eyes: No blurry vision  Neuro: No tremors  HEENT: No pain, no neck swelling  Cardiovascular: No chest pain, no palpitations  Respiratory: Has SOB, no cough  GI: No nausea, vomiting, abdominal pain  : No dysuria  Skin: no rash  MSK: Has leg swelling.  Psych: no depression  Endocrine: no polyuria, polydipsia    ALL OTHER SYSTEMS REVIEWED AND NEGATIVE    UNABLE TO OBTAIN    PHYSICAL EXAM:  VITALS: T(C): 37 (12-18-19 @ 12:18)  T(F): 98.6 (12-18-19 @ 12:18), Max: 98.6 (12-18-19 @ 12:18)  HR: 105 (12-18-19 @ 12:18) (102 - 108)  BP: 175/89 (12-18-19 @ 12:18) (163/97 - 175/97)  RR:  (18 - 18)  SpO2:  (95% - 97%)  Wt(kg): --  GENERAL: NAD, well-groomed, well-developed  EYES: No proptosis, no lid lag  HEENT:  Atraumatic, Normocephalic  THYROID: Normal size, no palpable nodules  RESPIRATORY: Clear to auscultation bilaterally; No rales, rhonchi, wheezing  CARDIOVASCULAR: Si S2, No murmurs;  GI: Soft, non distended, normal bowel sounds  SKIN: Dry, intact, No rashes or lesions  MUSCULOSKELETAL: Has BL lower extremity edema.  NEURO:  no tremor, sensation decreased in feet BL,    POCT Blood Glucose.: 237 mg/dL (12-18-19 @ 13:10)  POCT Blood Glucose.: 215 mg/dL (12-18-19 @ 12:07)  POCT Blood Glucose.: 231 mg/dL (12-18-19 @ 08:36)  POCT Blood Glucose.: 206 mg/dL (12-17-19 @ 22:39)  POCT Blood Glucose.: 247 mg/dL (12-17-19 @ 21:29)  POCT Blood Glucose.: 270 mg/dL (12-17-19 @ 17:32)  POCT Blood Glucose.: 329 mg/dL (12-17-19 @ 12:14)  POCT Blood Glucose.: 235 mg/dL (12-17-19 @ 08:39)  POCT Blood Glucose.: 328 mg/dL (12-16-19 @ 22:01)  POCT Blood Glucose.: 205 mg/dL (12-16-19 @ 18:06)  POCT Blood Glucose.: 204 mg/dL (12-16-19 @ 12:19)  POCT Blood Glucose.: 220 mg/dL (12-16-19 @ 08:39)  POCT Blood Glucose.: 287 mg/dL (12-16-19 @ 03:18)  POCT Blood Glucose.: 238 mg/dL (12-15-19 @ 22:42)                            14.0   11.66 )-----------( 237      ( 18 Dec 2019 09:31 )             42.6       12-18    142  |  104  |  21  ----------------------------<  269<H>  3.4<L>   |  21<L>  |  1.04    EGFR if : 77  EGFR if non : 66    Ca    8.7      12-18  Mg     1.9     12-16    TPro  6.4  /  Alb  3.5  /  TBili  0.4  /  DBili  x   /  AST  40  /  ALT  31  /  AlkPhos  58  12-15      Thyroid Function Tests:      Hemoglobin A1C, Whole Blood: 7.9 % <H> [4.0 - 5.6] (12-16-19 @ 15:52)          Radiology:

## 2019-12-18 NOTE — BEHAVIORAL HEALTH ASSESSMENT NOTE - NSBHCHARTREVIEWINVESTIGATE_PSY_A_CORE FT
< from: 12 Lead ECG (12.15.19 @ 17:19) >    Ventricular Rate 98 BPM    Atrial Rate 98 BPM    P-R Interval 168 ms    QRS Duration 136 ms    Q-T Interval 378 ms    QTC Calculation(Bezet) 482 ms    P Axis 34 degrees    R Axis -15 degrees    T Axis 27 degrees    Diagnosis Line NORMAL SINUS RHYTHM  RIGHT BUNDLE BRANCH BLOCK  ABNORMAL ECG    Confirmed by MD NATALI, CEASAR ([id number]) on 12/16/2019 10:55:53 PM    < end of copied text >

## 2019-12-18 NOTE — DISCHARGE NOTE PROVIDER - PROVIDER TOKENS
PROVIDER:[TOKEN:[[id number]]] PROVIDER:[TOKEN:[[id number]]],PROVIDER:[TOKEN:[[id number]]] PROVIDER:[TOKEN:[[id number]]],PROVIDER:[TOKEN:[[id number]]],PROVIDER:[TOKEN:[[id number]],FOLLOWUP:[1 week]]

## 2019-12-18 NOTE — DISCHARGE NOTE PROVIDER - HOSPITAL COURSE
85 yo male PMhx DM on insulin, HTN, BPH, HLD p/w generalized weakness and inability to ambulate after a fall, congestion with GRANT          Generalized weakness.  Plan: - IVF, eval and treat infection, PT eval with fall precaution.     Frequent falls.  Plan: Fall during Thanksgiving (tripped outside the house, and fell forward with head/facial/anterior abd bruising) was mechanical in nature.  This time, fall was due to generalized weakness and slipped OOB, possibly due to recent respiratory illness..    - PT eval and fall precaution.         Acute respiratory failure, unspecified whether with hypoxia or hypercapnia.  Plan: pulm f/u    f/u repeat cxr     - will treat with Duonebs, inhaled steroid, and O2 supplementation    - BIPAP if worsen or to decrease WOB.      Type 2 diabetes mellitus with other specified complication, with long-term current use of insulin.  Plan: - will hold all oral agents    - will monitor finger sticks and cover with low dose ISS    - will decrease basal insulin to half with decrease PO intake.          Essential hypertension.  Plan: - will hold HCTZ and Labetalol for now    - will treat with ARB with holding parameters.         Benign prostatic hyperplasia, unspecified whether lower urinary tract symptoms present. Plan: - will treat with Flomax 0.4.    Medication management.  Plan: Wife and patient unable to clarify Losartan/HCTZ,  Patient was told to discontinue some meds and started on Labetalol with parameters.         ACP (advance care planning).  Plan: Advanced care planning was discussed with patient and family.  Advanced care planning forms were reviewed and discussed.    Differential diagnosis and plan of care discussed with patient after the evaluation.     Pain assessed and judicious use of narcotics when appropriate was discussed.    Importance of Fall prevention discussed.    Counseling on Smoking and Alcohol cessation was offered when appropriate.    Counseling on Diet, exercise, and medication compliance was done. 83 yo male PMhx DM on insulin, HTN, BPH, HLD p/w generalized weakness and inability to ambulate after a fall, congestion with GRANT.         Congestion/wheezing, likely URI, abx stopped by pulm, nebs, pulm following, improved.     Fevers, id consulted, check blood and urine cult - ngtd, abx now stopped, resolved.     Afib with rvr, AC with lovenox bid, switch to NOAC upon d/c. Rate control with cardizem.    Echo. Plan for dccv 12/23, cancelled, pt converted to NSR. Patch upon discharge.    Dysphagia diet as per swallow eval.        Discharge to [facility] with outpatient Follow-up

## 2019-12-18 NOTE — BEHAVIORAL HEALTH ASSESSMENT NOTE - RISK ASSESSMENT
Low Acute Suicide Risk Risk factors include acute medical problems, insomnia. Protective factors include residential and financial stability, supportive family and social network, no history of depression or SI/SA, help-seeking behavior.

## 2019-12-18 NOTE — DISCHARGE NOTE PROVIDER - NSDCCPCAREPLAN_GEN_ALL_CORE_FT
PRINCIPAL DISCHARGE DIAGNOSIS  Diagnosis: Frequent falls  Assessment and Plan of Treatment:       SECONDARY DISCHARGE DIAGNOSES  Diagnosis: Benign prostatic hyperplasia, unspecified whether lower urinary tract symptoms present  Assessment and Plan of Treatment: Benign prostatic hyperplasia, unspecified whether lower urinary tract symptoms present  You have an enlarged prostate gland which gets bigger as men get older - it is a very common problem and has nothing to do with prostate cancer  Call your doctor if you are urinating more frequently, have trouble starting to urinate, have weak stream, urine leaking or dribbling, and feeling as though bladder is not empty after urination  Your doctor will monitor your prostate with a rectal exam as well as urine or blood testing  You can help yourself by reducing the amount of fluid you drink before going to bed, limiting the amount of alcohol & caffeine you drink   Avoid cold & allergy medication that contain decongestants or antihistamines which make BPH symptoms worse  You can also "double void" by waiting a moment after urinating & trying again  Take your medication as prescribed - one medication helps to relax the muscle aroung the urethra and the other medication you may take prevents the prostate from growing more or even shrinking the prostate      Diagnosis: Type 2 diabetes mellitus with other specified complication, with long-term current use of insulin  Assessment and Plan of Treatment: Type 2 diabetes mellitus with other specified complication, with long-term current use of insulin  HgA1C this admission.  Make sure you get your HgA1c checked every three months.  If you take oral diabetes medications, check your blood glucose two times a day.  If you take insulin, check your blood glucose before meals and at bedtime.  It's important not to skip any meals.  Keep a log of your blood glucose results and always take it with you to your doctor appointments.  Keep a list of your current medications including injectables and over the counter medications and bring this medication list with you to all your doctor appointments.  If you have not seen your opthalmologist this year call for appointment.  Check your feet daily for redness, sores, or openings. Do not self treat. If no improvement in two days call your primary care physician for an appointment.  Low blood sugar (hypoglycemia) is a blood sugar below 70mg/dl. Check your blood sugar if you feel signs/symptoms of hypoglycemia. If your blood sugar is below 70 take 15 grams of carbohydrates (ex 4 oz of apple juice, 3-4 glucosr tablets, or 4-6 oz of regular soda) wait 15 minutes and repeat blood sugar to make sure it comes up above 70.  If your blood sugar is above 70 and you are due for a meal, have a meal.  If you are not due for a meal have a snack.  This snack helps keeps your blood sugar at a safe range.      Diagnosis: Generalized weakness  Assessment and Plan of Treatment: PRINCIPAL DISCHARGE DIAGNOSIS  Diagnosis: Frequent falls  Assessment and Plan of Treatment: Physical Therapy  Fall precautions      SECONDARY DISCHARGE DIAGNOSES  Diagnosis: Afib  Assessment and Plan of Treatment: Atrial fibrillation is the most common heart rhythm problem.  The condition puts you at risk for has stroke and heart attack  It helps if you control your blood pressure, not drink more than 1-2 alcohol drinks per day, cut down on caffeine, getting treatment for over active thyroid gland, and get regular exercise  Call your doctor if you feel your heart racing or beating unusually, chest tightness or pain, lightheaded, faint, shortness of breath especially with exercise  It is important to take your heart medication as prescribed  You may be on anticoagulation which is very important to take as directed - you may need blood work to monitor drug levels      Diagnosis: Type 2 diabetes mellitus with other specified complication, with long-term current use of insulin  Assessment and Plan of Treatment: Type 2 diabetes mellitus with other specified complication, with long-term current use of insulin  HgA1C this admission.  Make sure you get your HgA1c checked every three months.  If you take oral diabetes medications, check your blood glucose two times a day.  If you take insulin, check your blood glucose before meals and at bedtime.  It's important not to skip any meals.  Keep a log of your blood glucose results and always take it with you to your doctor appointments.  Keep a list of your current medications including injectables and over the counter medications and bring this medication list with you to all your doctor appointments.  If you have not seen your opthalmologist this year call for appointment.  Check your feet daily for redness, sores, or openings. Do not self treat. If no improvement in two days call your primary care physician for an appointment.  Low blood sugar (hypoglycemia) is a blood sugar below 70mg/dl. Check your blood sugar if you feel signs/symptoms of hypoglycemia. If your blood sugar is below 70 take 15 grams of carbohydrates (ex 4 oz of apple juice, 3-4 glucosr tablets, or 4-6 oz of regular soda) wait 15 minutes and repeat blood sugar to make sure it comes up above 70.  If your blood sugar is above 70 and you are due for a meal, have a meal.  If you are not due for a meal have a snack.  This snack helps keeps your blood sugar at a safe range.      Diagnosis: Benign prostatic hyperplasia, unspecified whether lower urinary tract symptoms present  Assessment and Plan of Treatment: Benign prostatic hyperplasia, unspecified whether lower urinary tract symptoms present  You have an enlarged prostate gland which gets bigger as men get older - it is a very common problem and has nothing to do with prostate cancer  Call your doctor if you are urinating more frequently, have trouble starting to urinate, have weak stream, urine leaking or dribbling, and feeling as though bladder is not empty after urination  Your doctor will monitor your prostate with a rectal exam as well as urine or blood testing  You can help yourself by reducing the amount of fluid you drink before going to bed, limiting the amount of alcohol & caffeine you drink   Avoid cold & allergy medication that contain decongestants or antihistamines which make BPH symptoms worse  You can also "double void" by waiting a moment after urinating & trying again  Take your medication as prescribed - one medication helps to relax the muscle aroung the urethra and the other medication you may take prevents the prostate from growing more or even shrinking the prostate      Diagnosis: Generalized weakness  Assessment and Plan of Treatment:

## 2019-12-18 NOTE — SWALLOW BEDSIDE ASSESSMENT ADULT - NS ASR SWALLOW FINDINGS DISCUS
Patient/Family/Nursing/D/W Son at bedside, Patient/Family/Nursing/D/W Son at bedside, GONZALO Zazueta RN Mathieu

## 2019-12-18 NOTE — PROGRESS NOTE ADULT - PROBLEM SELECTOR PLAN 3
pulm f/u  f/u repeat cxr - neg  - will treat with Duonebs, inhaled steroid, and O2 supplementation  - BIPAP if worsen or to decrease WOB

## 2019-12-18 NOTE — CONSULT NOTE ADULT - PROBLEM SELECTOR RECOMMENDATION 9
Will increase Humalog to 10u before each meal.  Will continue Lantus 56u at bedtime as well as Humalog correction scale coverage. Will continue monitoring FS, log, and FU.  Patient counseled for compliance with consistent low carb diet and exercise as tolerated outpatient. Patient counseled for compliance with consistent low carb diet and exercise as tolerated outpatient.

## 2019-12-18 NOTE — DISCHARGE NOTE PROVIDER - NSDCMRMEDTOKEN_GEN_ALL_CORE_FT
Centrum Silver oral tablet: 1 tab(s) orally once a day  DULoxetine 60 mg oral delayed release capsule: 1 cap(s) orally once a day  GenTeal ophthalmic gel: 1 drop(s) in each eye once a day (at bedtime)  GenTeal ophthalmic solution: 1 drop(s) in each eye 3 times a day  ibuprofen 800 mg oral tablet: 1 tab(s) orally 3 times a day  labetalol 200 mg oral tablet: 2 tab(s) orally 2 times a day  losartan-hydrochlorothiazide 100mg-12.5mg oral tablet: 1 tab(s) orally once a day in the evening  metFORMIN 500 mg oral tablet: 2 tab(s) orally 2 times a day  NovoLOG FlexPen 100 units/mL injectable solution: 35 unit(s) subcutaneous with breakfast, 35 units with lunch, and 45 units with dinner  tamsulosin 0.4 mg oral capsule: 1 cap(s) orally once a day (at bedtime)  Toujeo SoloStar 300 units/mL subcutaneous solution: 70 unit(s) subcutaneous once a day (at bedtime)  triple flex (glucosamine 1500mg+ chondroitin 800mg): 1 tab(s) orally once a day  Victoza 18 mg/3 mL subcutaneous solution: 1.5-1.8 milligram(s) subcutaneous once a day  Vitamin D3 2000 intl units oral tablet: 1 tab(s) orally once a day Cardizem  mg/24 hours oral capsule, extended release: 1 cap(s) orally once a day   cholecalciferol oral tablet: 2000 unit(s) orally once a day  Eliquis 5 mg oral tablet: 1 tab(s) orally 2 times a day   labetalol 200 mg oral tablet: 2 tab(s) orally 2 times a day  losartan-hydrochlorothiazide 100mg-12.5mg oral tablet: 1 tab(s) orally once a day in the evening  Multiple Vitamins with Minerals oral tablet: 1 tab(s) orally once a day  NovoLOG FlexPen 100 units/mL injectable solution: 35 unit(s) subcutaneous with breakfast, 35 units with lunch, and 45 units with dinner  ocular lubricant ophthalmic solution: 1 drop(s) to each affected eye 4 times a day, As needed, Dry Eyes  tamsulosin 0.4 mg oral capsule: 1 cap(s) orally once a day (at bedtime)  Toujeo SoloStar 300 units/mL subcutaneous solution: 70 unit(s) subcutaneous once a day (at bedtime)  triple flex (glucosamine 1500mg+ chondroitin 800mg): 1 tab(s) orally once a day  Vitamin D3 2000 intl units oral tablet: 1 tab(s) orally once a day Cardizem  mg/24 hours oral capsule, extended release: 1 cap(s) orally once a day   cholecalciferol oral tablet: 2000 unit(s) orally once a day  Eliquis 5 mg oral tablet: 1 tab(s) orally 2 times a day   fluticasone 50 mcg/inh nasal spray: 1 spray(s) nasal 2 times a day  insulin glargine: 40 unit(s) subcutaneous once a day (at bedtime)  insulin lispro (concentrated) 200 units/mL subcutaneous solution: 8 unit(s) subcutaneous 3 times a day (before meals)   loratadine 10 mg oral tablet: 1 tab(s) orally once a day  losartan-hydrochlorothiazide 100mg-12.5mg oral tablet: 1 tab(s) orally once a day in the evening  Multiple Vitamins with Minerals oral tablet: 1 tab(s) orally once a day  ocular lubricant ophthalmic solution: 1 drop(s) to each affected eye 4 times a day, As needed, Dry Eyes  polyethylene glycol 3350 oral powder for reconstitution: 17 gram(s) orally once a day  senna oral tablet: 2 tab(s) orally once a day (at bedtime)  tamsulosin 0.4 mg oral capsule: 1 cap(s) orally once a day (at bedtime)

## 2019-12-19 ENCOUNTER — CHART COPY (OUTPATIENT)
Age: 84
End: 2019-12-19

## 2019-12-19 ENCOUNTER — APPOINTMENT (OUTPATIENT)
Dept: ORTHOPEDIC SURGERY | Facility: CLINIC | Age: 84
End: 2019-12-19

## 2019-12-19 LAB
ANION GAP SERPL CALC-SCNC: 22 MMOL/L — HIGH (ref 5–17)
BUN SERPL-MCNC: 29 MG/DL — HIGH (ref 7–23)
CALCIUM SERPL-MCNC: 8.9 MG/DL — SIGNIFICANT CHANGE UP (ref 8.4–10.5)
CHLORIDE SERPL-SCNC: 102 MMOL/L — SIGNIFICANT CHANGE UP (ref 96–108)
CO2 SERPL-SCNC: 22 MMOL/L — SIGNIFICANT CHANGE UP (ref 22–31)
CREAT SERPL-MCNC: 1.25 MG/DL — SIGNIFICANT CHANGE UP (ref 0.5–1.3)
GLUCOSE BLDC GLUCOMTR-MCNC: 104 MG/DL — HIGH (ref 70–99)
GLUCOSE BLDC GLUCOMTR-MCNC: 198 MG/DL — HIGH (ref 70–99)
GLUCOSE BLDC GLUCOMTR-MCNC: 276 MG/DL — HIGH (ref 70–99)
GLUCOSE BLDC GLUCOMTR-MCNC: 336 MG/DL — HIGH (ref 70–99)
GLUCOSE BLDC GLUCOMTR-MCNC: 63 MG/DL — LOW (ref 70–99)
GLUCOSE BLDC GLUCOMTR-MCNC: 64 MG/DL — LOW (ref 70–99)
GLUCOSE BLDC GLUCOMTR-MCNC: 80 MG/DL — SIGNIFICANT CHANGE UP (ref 70–99)
GLUCOSE SERPL-MCNC: 292 MG/DL — HIGH (ref 70–99)
HBA1C BLD-MCNC: 8.3 % — HIGH (ref 4–5.6)
HCT VFR BLD CALC: 41.6 % — SIGNIFICANT CHANGE UP (ref 39–50)
HGB BLD-MCNC: 13.9 G/DL — SIGNIFICANT CHANGE UP (ref 13–17)
MCHC RBC-ENTMCNC: 30.5 PG — SIGNIFICANT CHANGE UP (ref 27–34)
MCHC RBC-ENTMCNC: 33.4 GM/DL — SIGNIFICANT CHANGE UP (ref 32–36)
MCV RBC AUTO: 91.4 FL — SIGNIFICANT CHANGE UP (ref 80–100)
NRBC # BLD: 0 /100 WBCS — SIGNIFICANT CHANGE UP (ref 0–0)
PLATELET # BLD AUTO: 243 K/UL — SIGNIFICANT CHANGE UP (ref 150–400)
POTASSIUM SERPL-MCNC: 3.7 MMOL/L — SIGNIFICANT CHANGE UP (ref 3.5–5.3)
POTASSIUM SERPL-SCNC: 3.7 MMOL/L — SIGNIFICANT CHANGE UP (ref 3.5–5.3)
RBC # BLD: 4.55 M/UL — SIGNIFICANT CHANGE UP (ref 4.2–5.8)
RBC # FLD: 12.9 % — SIGNIFICANT CHANGE UP (ref 10.3–14.5)
SODIUM SERPL-SCNC: 146 MMOL/L — HIGH (ref 135–145)
WBC # BLD: 8.95 K/UL — SIGNIFICANT CHANGE UP (ref 3.8–10.5)
WBC # FLD AUTO: 8.95 K/UL — SIGNIFICANT CHANGE UP (ref 3.8–10.5)

## 2019-12-19 PROCEDURE — 93010 ELECTROCARDIOGRAM REPORT: CPT

## 2019-12-19 PROCEDURE — 99233 SBSQ HOSP IP/OBS HIGH 50: CPT

## 2019-12-19 RX ORDER — INSULIN GLARGINE 100 [IU]/ML
62 INJECTION, SOLUTION SUBCUTANEOUS AT BEDTIME
Refills: 0 | Status: DISCONTINUED | OUTPATIENT
Start: 2019-12-19 | End: 2019-12-23

## 2019-12-19 RX ORDER — INSULIN LISPRO 100/ML
14 VIAL (ML) SUBCUTANEOUS
Refills: 0 | Status: DISCONTINUED | OUTPATIENT
Start: 2019-12-19 | End: 2019-12-23

## 2019-12-19 RX ORDER — ENOXAPARIN SODIUM 100 MG/ML
80 INJECTION SUBCUTANEOUS EVERY 12 HOURS
Refills: 0 | Status: DISCONTINUED | OUTPATIENT
Start: 2019-12-19 | End: 2019-12-20

## 2019-12-19 RX ORDER — DILTIAZEM HCL 120 MG
30 CAPSULE, EXT RELEASE 24 HR ORAL ONCE
Refills: 0 | Status: COMPLETED | OUTPATIENT
Start: 2019-12-19 | End: 2019-12-19

## 2019-12-19 RX ORDER — DILTIAZEM HCL 120 MG
10 CAPSULE, EXT RELEASE 24 HR ORAL ONCE
Refills: 0 | Status: COMPLETED | OUTPATIENT
Start: 2019-12-19 | End: 2019-12-19

## 2019-12-19 RX ORDER — DILTIAZEM HCL 120 MG
60 CAPSULE, EXT RELEASE 24 HR ORAL EVERY 8 HOURS
Refills: 0 | Status: DISCONTINUED | OUTPATIENT
Start: 2019-12-19 | End: 2019-12-24

## 2019-12-19 RX ORDER — DILTIAZEM HCL 120 MG
30 CAPSULE, EXT RELEASE 24 HR ORAL EVERY 6 HOURS
Refills: 0 | Status: DISCONTINUED | OUTPATIENT
Start: 2019-12-19 | End: 2019-12-19

## 2019-12-19 RX ADMIN — Medication 200 MILLIGRAM(S): at 06:19

## 2019-12-19 RX ADMIN — TAMSULOSIN HYDROCHLORIDE 0.4 MILLIGRAM(S): 0.4 CAPSULE ORAL at 21:58

## 2019-12-19 RX ADMIN — Medication 10 MILLIGRAM(S): at 15:05

## 2019-12-19 RX ADMIN — Medication 1 SPRAY(S): at 05:31

## 2019-12-19 RX ADMIN — Medication 12.5 MILLIGRAM(S): at 06:19

## 2019-12-19 RX ADMIN — Medication 30 MILLIGRAM(S): at 06:19

## 2019-12-19 RX ADMIN — Medication 2000 UNIT(S): at 12:16

## 2019-12-19 RX ADMIN — Medication 1 SPRAY(S): at 18:02

## 2019-12-19 RX ADMIN — Medication 3 MILLILITER(S): at 05:32

## 2019-12-19 RX ADMIN — Medication 30 MILLIGRAM(S): at 06:20

## 2019-12-19 RX ADMIN — Medication 8: at 12:15

## 2019-12-19 RX ADMIN — Medication 200 MILLIGRAM(S): at 18:02

## 2019-12-19 RX ADMIN — POLYETHYLENE GLYCOL 3350 17 GRAM(S): 17 POWDER, FOR SOLUTION ORAL at 12:17

## 2019-12-19 RX ADMIN — Medication 30 MILLIGRAM(S): at 12:26

## 2019-12-19 RX ADMIN — Medication 6: at 08:22

## 2019-12-19 RX ADMIN — INSULIN GLARGINE 62 UNIT(S): 100 INJECTION, SOLUTION SUBCUTANEOUS at 22:03

## 2019-12-19 RX ADMIN — PIPERACILLIN AND TAZOBACTAM 25 GRAM(S): 4; .5 INJECTION, POWDER, LYOPHILIZED, FOR SOLUTION INTRAVENOUS at 05:32

## 2019-12-19 RX ADMIN — Medication 1 TABLET(S): at 12:16

## 2019-12-19 RX ADMIN — Medication 0.5 MILLIGRAM(S): at 05:32

## 2019-12-19 RX ADMIN — ENOXAPARIN SODIUM 80 MILLIGRAM(S): 100 INJECTION SUBCUTANEOUS at 18:02

## 2019-12-19 RX ADMIN — PIPERACILLIN AND TAZOBACTAM 25 GRAM(S): 4; .5 INJECTION, POWDER, LYOPHILIZED, FOR SOLUTION INTRAVENOUS at 01:31

## 2019-12-19 RX ADMIN — Medication 14 UNIT(S): at 12:15

## 2019-12-19 RX ADMIN — LOSARTAN POTASSIUM 100 MILLIGRAM(S): 100 TABLET, FILM COATED ORAL at 06:20

## 2019-12-19 RX ADMIN — OXYMETAZOLINE HYDROCHLORIDE 1 SPRAY(S): 0.5 SPRAY NASAL at 05:32

## 2019-12-19 RX ADMIN — Medication 10 UNIT(S): at 08:22

## 2019-12-19 RX ADMIN — Medication 60 MILLIGRAM(S): at 21:58

## 2019-12-19 RX ADMIN — LORATADINE 10 MILLIGRAM(S): 10 TABLET ORAL at 12:16

## 2019-12-19 NOTE — SWALLOW BEDSIDE ASSESSMENT ADULT - SWALLOW EVAL: RECOMMENDED FEEDING/EATING TECHNIQUES
crush medication (when feasible)/no straws
allow for swallow between intakes/crush medication (when feasible)/no straws/position upright (90 degrees)/small sips/bites/maintain upright posture during/after eating for 30 mins/oral hygiene

## 2019-12-19 NOTE — SWALLOW BEDSIDE ASSESSMENT ADULT - SWALLOW EVAL: RECOMMENDED DIET
Dysphagia I diet with honey thick fluids. Crush meds of provide via alternate source.
1)Dysphagia 2 with nectar thickened liquids 2)SUPERVISION with meals to ensure slow rate/small bites due to suspected impulsivity with PO intake

## 2019-12-19 NOTE — PROGRESS NOTE ADULT - SUBJECTIVE AND OBJECTIVE BOX
HOLLIE STEVENS  84y Male  MRN:[id number]    Patient is a 84y old  Male who presents with a chief complaint of s/p fall (15 Dec 2019 22:05)    HPI:  83 yo male PMhx DM on insulin, HTN, BPH, HLD presents to the ED with unwitnessed fall the day prior with inability to get up and ambulate. Pt reports he slipped off the bed the day prior onto floor and was unable to get up.  Wife attempted to help him up but was unable, so placed pillow under head and pt slept on floor overnight. This AM pt was still unable to get up so EMS was called. Patient states he uses a cane to ambulate outside the house, but ambulate independently.  Patient had a trip and fall [place] the [place] in [place], resulting in bruising and hematoma R chin, abd, and R frontal scalp, evaluated here in ED, and discharged home.  Patient has been feeling congested for the past couple days, decrease PO intake, and GRANT.  Patient denies fever (but on Ibuprofen 800 3x daily for back and leg pain), chest pain, cough, LOC, dysuria or abd pain.  No sick contact.  c/o gen weakness and inability to ambulate (15 Dec 2019 22:05)      Patient seen and evaluated at bedside. No acute events overnight except as noted.    Interval HPI: overnight events noted. developed afib with rvr. tx to tele     PAST MEDICAL & SURGICAL HISTORY:  Diabetes mellitus type 2 in nonobese  BPH (benign prostatic hyperplasia)  Hyperlipidemia  HTN (hypertension)  H/O arthroscopy: R knee      REVIEW OF SYSTEMS:  as per hpi       VITALS:  Vital Signs Last 24 Hrs  T(C): 36.3 (19 Dec 2019 11:43), Max: 37.1 (18 Dec 2019 19:37)  T(F): 97.4 (19 Dec 2019 11:43), Max: 98.8 (18 Dec 2019 19:37)  HR: 105 (19 Dec 2019 11:43) (80 - 174)  BP: 125/85 (19 Dec 2019 11:43) (125/85 - 157/92)  BP(mean): --  RR: 19 (19 Dec 2019 11:43) (19 - 30)  SpO2: 94% (19 Dec 2019 11:43) (94% - 98%)      PHYSICAL EXAM:  GENERAL: NAD, well-developed  HEAD:  +head trauma, +ecchymosis on face/neck  EYES: EOMI, PERRLA, conjunctiva and sclera clear  NECK: Supple, No JVD  CHEST/LUNG: b/l congestion/wheeze   HEART: S1, S2; irreg irreg. No murmurs, rubs, or gallops  ABDOMEN: Soft, Nontender, Nondistended; Bowel sounds present  EXTREMITIES:  2+ Peripheral Pulses, No clubbing, cyanosis, or edema  PSYCH: Normal affect  NEUROLOGY: AAOX3; non-focal  SKIN: No rashes or lesions    Consultant(s) Notes Reviewed:  [x ] YES  [ ] NO  Care Discussed with Consultants/Other Providers [ x] YES  [ ] NO    MEDS:  MEDICATIONS  (STANDING):  acetaminophen  IVPB .. 1000 milliGRAM(s) IV Intermittent once  cholecalciferol 2000 Unit(s) Oral daily  dextrose 5%. 1000 milliLiter(s) (50 mL/Hr) IV Continuous <Continuous>  dextrose 50% Injectable 12.5 Gram(s) IV Push once  dextrose 50% Injectable 25 Gram(s) IV Push once  dextrose 50% Injectable 25 Gram(s) IV Push once  diltiazem    Tablet 30 milliGRAM(s) Oral every 6 hours  enoxaparin Injectable 80 milliGRAM(s) SubCutaneous every 12 hours  enoxaparin Injectable 90 milliGRAM(s) SubCutaneous once  fluticasone propionate 50 MICROgram(s)/spray Nasal Spray 1 Spray(s) Both Nostrils two times a day  hydrochlorothiazide 12.5 milliGRAM(s) Oral daily  insulin glargine Injectable (LANTUS) 62 Unit(s) SubCutaneous at bedtime  insulin lispro (HumaLOG) corrective regimen sliding scale   SubCutaneous three times a day before meals  insulin lispro (HumaLOG) corrective regimen sliding scale   SubCutaneous at bedtime  insulin lispro Injectable (HumaLOG) 14 Unit(s) SubCutaneous three times a day before meals  labetalol 200 milliGRAM(s) Oral two times a day  loratadine 10 milliGRAM(s) Oral daily  losartan 100 milliGRAM(s) Oral daily  multivitamin/minerals 1 Tablet(s) Oral daily  polyethylene glycol 3350 17 Gram(s) Oral daily  potassium chloride   Solution 20 milliEquivalent(s) Oral once  tamsulosin 0.4 milliGRAM(s) Oral at bedtime    MEDICATIONS  (PRN):  artificial tears (preservative free) Ophthalmic Solution 1 Drop(s) Both EYES four times a day PRN Dry Eyes  dextrose 40% Gel 15 Gram(s) Oral once PRN Blood Glucose LESS THAN 70 milliGRAM(s)/deciliter  glucagon  Injectable 1 milliGRAM(s) IntraMuscular once PRN Glucose LESS THAN 70 milligrams/deciliter        ALLERGIES:  No Known Allergies      LABS:                                       13.9   8.95  )-----------( 243      ( 19 Dec 2019 06:58 )             41.6   12-19    146<H>  |  102  |  29<H>  ----------------------------<  292<H>  3.7   |  22  |  1.25    Ca    8.9      19 Dec 2019 06:58  Phos  2.5     12-18  Mg     1.9     12-18          < from: Xray Chest 1 View- PORTABLE-Urgent (12.16.19 @ 12:45) >  IMPRESSION:    Clear lungs.    < end of copied text >         < from: CT Thoracic Spine Reform No Cont (12.15.19 @ 17:20) >    IMPRESSION:   CT BRAIN: No acute intracranial bleeding, mass effect, or shift.   Resolving right frontal scalp hematoma, now small compared to prior CT   head from 11/28/2019.     CT CERVICAL SPINE: No acute fracture subluxation. Multilevel   spondylosis..     CT THORACIC SPINE: No acute fracture subluxation. Multilevel spondylosis.   Increased density along the left posterior lateral aspect of the central   canal at the T6-7 level possibly representing a meningioma. Follow-up   thoracic spine MRI is recommended.  Partially imaged abdomen demonstrates right renal hypodensity and nodular   thickening of the bilateral adrenal glands better evaluated on same day   CT abdomen pelvis.     CT LUMBAR SPINE: No acute fracture subluxation. Multilevel spondylosis.    < end of copied text >

## 2019-12-19 NOTE — PROGRESS NOTE ADULT - SUBJECTIVE AND OBJECTIVE BOX
Infectious Diseases progress note:    Subjective: Events noted.  RRT called yesterday due to rapid A.fib.  As per RRT notes, pt with rectal temp 101.  Sepsis w/u sent.   Pt currently denies worsening cough or sputum production, sore throat, congestion, abd pain, dysuria, diarrhea.  Pt s/p swallow eval.   Now off abx.     ROS:  CONSTITUTIONAL:  No fever, chills, rigors  CARDIOVASCULAR:  No chest pain or palpitations  RESPIRATORY:   No SOB, cough, dyspnea on exertion.  No wheezing  GASTROINTESTINAL:  No abd pain, N/V, diarrhea/constipation  EXTREMITIES:  No swelling or joint pain  GENITOURINARY:  No burning on urination, increased frequency or urgency.  No flank pain  NEUROLOGIC:  No HA, visual disturbances  SKIN: No rashes    Allergies    No Known Allergies    Intolerances        ANTIBIOTICS/RELEVANT:  antimicrobials    immunologic:    OTHER:  acetaminophen  IVPB .. 1000 milliGRAM(s) IV Intermittent once  artificial tears (preservative free) Ophthalmic Solution 1 Drop(s) Both EYES four times a day PRN  cholecalciferol 2000 Unit(s) Oral daily  dextrose 40% Gel 15 Gram(s) Oral once PRN  dextrose 5%. 1000 milliLiter(s) IV Continuous <Continuous>  dextrose 50% Injectable 12.5 Gram(s) IV Push once  dextrose 50% Injectable 25 Gram(s) IV Push once  dextrose 50% Injectable 25 Gram(s) IV Push once  diltiazem    Tablet 30 milliGRAM(s) Oral every 6 hours  diltiazem Injectable 10 milliGRAM(s) IV Push once  enoxaparin Injectable 90 milliGRAM(s) SubCutaneous once  enoxaparin Injectable 80 milliGRAM(s) SubCutaneous every 12 hours  fluticasone propionate 50 MICROgram(s)/spray Nasal Spray 1 Spray(s) Both Nostrils two times a day  glucagon  Injectable 1 milliGRAM(s) IntraMuscular once PRN  hydrochlorothiazide 12.5 milliGRAM(s) Oral daily  insulin glargine Injectable (LANTUS) 62 Unit(s) SubCutaneous at bedtime  insulin lispro (HumaLOG) corrective regimen sliding scale   SubCutaneous three times a day before meals  insulin lispro (HumaLOG) corrective regimen sliding scale   SubCutaneous at bedtime  insulin lispro Injectable (HumaLOG) 14 Unit(s) SubCutaneous three times a day before meals  labetalol 200 milliGRAM(s) Oral two times a day  loratadine 10 milliGRAM(s) Oral daily  losartan 100 milliGRAM(s) Oral daily  multivitamin/minerals 1 Tablet(s) Oral daily  polyethylene glycol 3350 17 Gram(s) Oral daily  potassium chloride   Solution 20 milliEquivalent(s) Oral once  tamsulosin 0.4 milliGRAM(s) Oral at bedtime      Objective:  Vital Signs Last 24 Hrs  T(C): 36.3 (19 Dec 2019 11:43), Max: 37.1 (18 Dec 2019 19:37)  T(F): 97.4 (19 Dec 2019 11:43), Max: 98.8 (18 Dec 2019 19:37)  HR: 105 (19 Dec 2019 11:43) (80 - 174)  BP: 125/85 (19 Dec 2019 11:43) (125/85 - 157/92)  BP(mean): --  RR: 19 (19 Dec 2019 11:43) (19 - 30)  SpO2: 94% (19 Dec 2019 11:43) (94% - 98%)    PHYSICAL EXAM:  Constitutional:NAD  Eyes:CURTIS, EOMI  Ear/Nose/Throat: no thrush, mucositis.  Moist mucous membranes	  Neck:no JVD, no lymphadenopathy, supple  Respiratory: CTA jamila  Cardiovascular: S1S2 RRR, no murmurs  Gastrointestinal:soft, nontender,  nondistended (+) BS  Extremities:no e/e/c  Skin:  no rashes, open wounds or ulcerations        LABS:                        13.9   8.95  )-----------( 243      ( 19 Dec 2019 06:58 )             41.6     12-19    146<H>  |  102  |  29<H>  ----------------------------<  292<H>  3.7   |  22  |  1.25    Ca    8.9      19 Dec 2019 06:58  Phos  2.5     12-18  Mg     1.9     12-18                      Rapid RVP Result: NotDetec  Rapid RVP Result: NotDetec          MICROBIOLOGY:    Culture - Blood (12.16.19 @ 17:34)    Specimen Source: .Blood Blood-Peripheral    Culture Results:   No growth to date.    Culture - Blood (12.16.19 @ 17:34)    Specimen Source: .Blood Blood-Peripheral    Culture Results:   No growth to date.    Culture - Urine (12.15.19 @ 22:11)    Specimen Source: .Urine Clean Catch (Midstream)    Culture Results:   Aerococcus species  "Aerococcus spp. are predictably susceptible to penicillin,  ampicillin, tetracycline, and vancomycin.  All isolates are  resistant to sulfonamides"  <10,000 CFU/ml Normal Urogenital pema present          RADIOLOGY & ADDITIONAL STUDIES:    < from: CT Head No Cont (12.18.19 @ 21:00) >  FINDINGS: There is no acute intracranial hemorrhage, mass effect, shift of the midline structures, herniation, extra-axial fluid collection, or hydrocephalus.    There is diffuse cerebral volume loss with prominence of the sulci, fissures, and cisternal spaces which is normal for the patient's age. 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    The paranasal sinuses and mastoid air cells are clear. The calvarium is intact. A mild amount of right frontal scalp soft tissue swelling is again appreciated. There is evidence of bilateral cataract removal.    IMPRESSION: No acute intracranial hemorrhage, mass effect, or shift of the midline structures.    < end of copied text >

## 2019-12-19 NOTE — PROGRESS NOTE ADULT - SUBJECTIVE AND OBJECTIVE BOX
HPI:  Patient seen and examined at bedside on 4 [place].  New onset atrial fibrillation - started on anticoagulation.    Review Of Systems:           Respiratory: No shortness of breath, cough, or wheezing  Cardiovascular: No chest pain or palpitations  10 point review of systems is otherwise negative except as mentioned above        Medications:  acetaminophen  IVPB .. 1000 milliGRAM(s) IV Intermittent once  artificial tears (preservative free) Ophthalmic Solution 1 Drop(s) Both EYES four times a day PRN  cholecalciferol 2000 Unit(s) Oral daily  dextrose 40% Gel 15 Gram(s) Oral once PRN  dextrose 5%. 1000 milliLiter(s) IV Continuous <Continuous>  dextrose 50% Injectable 12.5 Gram(s) IV Push once  dextrose 50% Injectable 25 Gram(s) IV Push once  dextrose 50% Injectable 25 Gram(s) IV Push once  diltiazem    Tablet 60 milliGRAM(s) Oral every 8 hours  enoxaparin Injectable 90 milliGRAM(s) SubCutaneous once  enoxaparin Injectable 80 milliGRAM(s) SubCutaneous every 12 hours  fluticasone propionate 50 MICROgram(s)/spray Nasal Spray 1 Spray(s) Both Nostrils two times a day  glucagon  Injectable 1 milliGRAM(s) IntraMuscular once PRN  hydrochlorothiazide 12.5 milliGRAM(s) Oral daily  insulin glargine Injectable (LANTUS) 62 Unit(s) SubCutaneous at bedtime  insulin lispro (HumaLOG) corrective regimen sliding scale   SubCutaneous three times a day before meals  insulin lispro (HumaLOG) corrective regimen sliding scale   SubCutaneous at bedtime  insulin lispro Injectable (HumaLOG) 14 Unit(s) SubCutaneous three times a day before meals  labetalol 200 milliGRAM(s) Oral two times a day  loratadine 10 milliGRAM(s) Oral daily  losartan 100 milliGRAM(s) Oral daily  multivitamin/minerals 1 Tablet(s) Oral daily  polyethylene glycol 3350 17 Gram(s) Oral daily  potassium chloride   Solution 20 milliEquivalent(s) Oral once  tamsulosin 0.4 milliGRAM(s) Oral at bedtime    PAST MEDICAL & SURGICAL HISTORY:  Diabetes mellitus type 2 in nonobese  BPH (benign prostatic hyperplasia)  Hyperlipidemia  HTN (hypertension)  H/O arthroscopy: R knee    Vitals:  T(C): 36.9 (19 @ 21:06), Max: 36.9 (19 @ 21:06)  HR: 88 (19 @ 21:06) (88 - 105)  BP: 110/74 (19 @ 21:06) (110/74 - 142/77)  BP(mean): --  RR: 18 (19 @ 21:06) (18 - 20)  SpO2: 97% (19 @ 21:06) (94% - 98%)  Wt(kg): --  Daily     Daily Weight in k.1 (19 Dec 2019 07:46)  I&O's Summary    18 Dec 2019 07:  -  19 Dec 2019 07:00  --------------------------------------------------------  IN: 480 mL / OUT: 0 mL / NET: 480 mL    19 Dec 2019 07:01  -  19 Dec 2019 23:41  --------------------------------------------------------  IN: 1500 mL / OUT: 0 mL / NET: 1500 mL        Physical Exam:  Appearance: Normal, well groomed, NAD  Eyes: PERRLA, EOMI, pink conjunctiva, no scleral icterus   HENT: Normal oral mucosa; old ecchymoses  Cardiovascular: irregular S1, S2, no murmur, rub, or gallop; no edema; no JVD  Respiratory: Clear to auscultation bilaterally  Gastrointestinal: Soft, non-tender, non-distended, BS+  Musculoskeletal: No clubbing or joint deformity   Neurologic: No focal weakness  Lymphatic: No lymphadenopathy  Psychiatry: AAOx3 with appropriate mood and affect  Skin: No rashes, ecchymoses, or cyanosis                          13.9   8.95  )-----------( 243      ( 19 Dec 2019 06:58 )             41.6         146<H>  |  102  |  29<H>  ----------------------------<  292<H>  3.7   |  22  |  1.25    Ca    8.9      19 Dec 2019 06:58  Phos  2.5     12-18  Mg     1.9     12-18        Interpretation of Telemetry: rate controlled AFib

## 2019-12-19 NOTE — SWALLOW BEDSIDE ASSESSMENT ADULT - ASR SWALLOW RECOMMEND DIAG
Will continue to monitor pt clinically to determine capactity for subjective upgrade vs instrumental assessment
not clinically warranted at this time

## 2019-12-19 NOTE — SWALLOW BEDSIDE ASSESSMENT ADULT - ADDITIONAL RECOMMENDATIONS
Maintain adequate oral hygiene.   This service to follow.
maintain good oral hygiene  Monitor for s/s aspiration/laryngeal penetration. If noted:  D/C p.o. intake, provide non-oral nutrition/hydration/meds, and contact this service @ [phone]

## 2019-12-19 NOTE — PROGRESS NOTE ADULT - ASSESSMENT
Assessment  DMT2: 84y Male with DM T2 with hyperglycemia, A1C 7.9%, was on oral meds and insulin at home, now on basal bolus insulin, increased dose yesterday, blood sugars still running high and not at target (, 336),  no hypoglycemic episode. Patient is eating meals, appears more alert today.  URI: on management, stable, monitored.  Frequent Falls: On fall precautions, PT eval.  HTN: Controlled,  on antihypertensive medications.  HLD: Controlled, on statin.          Gentry Cole MD  Cell: [phone]  Office: [phone] Assessment  DMT2: 84y Male with DM T2 with hyperglycemia, A1C 7.9%, was on oral meds and insulin at home, now on basal bolus insulin, increased dose yesterday, blood sugars still running high and not at target (, 336),   no hypoglycemic episode. Patient is eating meals, appears more alert today.  URI: on management, stable, monitored.  Frequent Falls: On fall precautions, PT eval.  HTN: Controlled,  on antihypertensive medications.  HLD: Controlled, on statin.          Gentry Cole MD  Cell: [phone]  Office: [phone]

## 2019-12-19 NOTE — PROGRESS NOTE ADULT - ASSESSMENT
84 year-old gentleman with multiple cardiovascular risk factors as above presents with falls and weakness of unclear etiology.  Airways sound tight, but patient is not grossly volume overloaded.  No evidence of recent ACS or decompensated heart failure.    New onset atrial fibrillation is likely unrelated to his fall.    NPO after midnight for KATHLEEN and DCCV tomorrow.  Continue therapeutic anticoagulation with plans to discharge on Xarelto 20 mg daily or Eliquis 5 mg BID for patient with age > 80, weight > 60 kg, and creatinine < 1.5 mg/dL.

## 2019-12-19 NOTE — PROGRESS NOTE ADULT - ASSESSMENT
85 yo male PMhx DM on insulin, HTN, BPH, HLD presents to the ED with unwitnessed fall the day prior with inability to get up and ambulate. Pt reports he slipped off the bed the day prior onto floor and was unable to get up.  Wife attempted to help him up but was unable, so placed pillow under head and pt slept on floor overnight. This AM pt was still unable to get up so EMS was called. Patient states he uses a cane to ambulate outside the house, but ambulate independently.  Patient had a trip and fall outside the house in [facility], resulting in bruising and hematoma R chin, abd, and R frontal scalp, evaluated here in ED, and discharged home.  Patient has been feeling congested for the past couple days, decrease PO intake, and GRANT.  Patient denies fever (but on Ibuprofen 800 3x daily for back and leg pain), chest pain, cough, LOC, dysuria or abd pain.  No sick contact.  c/o gen weakness and inability to ambulate (15 Dec 2019 22:05)    ER vitals:  T 98.2, P 104, /98.  Tmax 100.8 (R).  No leukocytosis.  No acute fx on CT imaging.  CT c/a/p no visceral injuries, no consolidation, no infectious focus identified.  UA (-) nit/LE.  RVP (-) x 2.      Pt seen by Pulm for c/o  rhinorrhea, post-nasal drip, nasal congestion.  Started on treatment for URI with bronchospasm although RVP (-).   -> URI although RVP is negative.  Pt started on Prednisone 50mg Qdaily and augmentin 500mg PO bid.     ID consult called for evaluation of fever.     Fever:    - Possible URI given congestion with post-nasal drip, pt with wheezing.  Started on steroids and empiric abx.  No consolidations on CT imaging.      - Pt s/p completion of augmentin.    - s/p RRT on 12/18 for rapid A.fib - transferred to telemetry.   Pt currently afebrile, normal WBC.  Pt without new localizing symptoms on clinical exam.  Denies productive cough, congestion/sore throat.  Repeat bcx sent yesterday.  f/u cxr.        Will follow,    Zenia Berg  [phone]

## 2019-12-19 NOTE — PROGRESS NOTE ADULT - PROBLEM SELECTOR PLAN 1
Will increase Lantus to 62u at bedtime, increase Humalog to 14u before each meal, and continue coverage scale. Will continue monitoring FS, log, and FU.  Patient counseled for compliance with consistent low carb diet and exercise as tolerated outpatient. Patient counseled for compliance with consistent low carb diet and exercise as tolerated outpatient.

## 2019-12-19 NOTE — PROGRESS NOTE ADULT - ASSESSMENT
85 yo male PMhx DM on insulin, HTN, BPH, HLD p/w generalized weakness and inability to ambulate after a fall, congestion with GRANT     congestion/wheezing  likely URI  abx stopped by pulm  trey  pulm follow up    fevers  id consulted  check blood and urine cult - ngtd  abx now stopped     afib with rvr  AC with lovenox bid  rate control with cardizem  f/u echo  cont tele monitor  cards f/u

## 2019-12-19 NOTE — PROGRESS NOTE ADULT - SUBJECTIVE AND OBJECTIVE BOX
[facility] - [facility] - PROGRESS NOTE    CHIEF COMPLAINT: CHIEF COMPLAINT: sinusitis; bronchospasm; rhinorrhea; nasal congestion; post nasal drip; s/p fall    INTERVAL HISTORY: mental status almost back to baseline off systemic steroids (although given a dose of IV solumedrol last evening); transferred to telemetry due to AF with RVR without hemodynamic instability, chest pain/pressure or palpitations -> sudafed and Afrin discontinued; much less rhinorrhea, nasal congestion and post-nasal drip; no shortness of breath on a nasal canula; no cough or sputum production; no chest congestion or wheeze; no fevers, chills or sweats;    REVIEW OF SYSTEMS:  Constitutional: As per interval history  HEENT: As per interval history  CV: As per interval history  Resp: As per interval history  GI: Within normal limits   : Within normal limits  Musculoskeletal: Within normal limits  Skin: Within normal limits  Neurological: Within normal limits  Psychiatric: confusion -> improved  Endocrine: Within normal limits  Hematologic/Lymphatic: Within normal limits  Allergic/Immunologic: Within normal limits    MEDICATIONS:     Pulmonary "  albuterol/ipratropium for Nebulization 3 milliLiter(s) Nebulizer every 6 hours  buDESOnide    Inhalation Suspension 0.5 milliGRAM(s) Inhalation two times a day  loratadine 10 milliGRAM(s) Oral daily    Anti-microbials:  piperacillin/tazobactam IVPB. 3.375 Gram(s) IV Intermittent once  piperacillin/tazobactam IVPB.. 3.375 Gram(s) IV Intermittent every 8 hours    Cardiovascular:  diltiazem    Tablet 30 milliGRAM(s) Oral every 6 hours  diltiazem Injectable 10 milliGRAM(s) IV Push once  hydrochlorothiazide 12.5 milliGRAM(s) Oral daily  labetalol 200 milliGRAM(s) Oral two times a day  losartan 100 milliGRAM(s) Oral daily  tamsulosin 0.4 milliGRAM(s) Oral at bedtime    Other:  acetaminophen  IVPB .. 1000 milliGRAM(s) IV Intermittent once  cholecalciferol 2000 Unit(s) Oral daily  dextrose 5%. 1000 milliLiter(s) IV Continuous <Continuous>  dextrose 50% Injectable 12.5 Gram(s) IV Push once  dextrose 50% Injectable 25 Gram(s) IV Push once  dextrose 50% Injectable 25 Gram(s) IV Push once  enoxaparin Injectable 80 milliGRAM(s) SubCutaneous every 12 hours  enoxaparin Injectable 90 milliGRAM(s) SubCutaneous once  fluticasone propionate 50 MICROgram(s)/spray Nasal Spray 1 Spray(s) Both Nostrils two times a day  insulin glargine Injectable (LANTUS) 62 Unit(s) SubCutaneous at bedtime  insulin lispro (HumaLOG) corrective regimen sliding scale   SubCutaneous three times a day before meals  insulin lispro (HumaLOG) corrective regimen sliding scale   SubCutaneous at bedtime  insulin lispro Injectable (HumaLOG) 14 Unit(s) SubCutaneous three times a day before meals  multivitamin/minerals 1 Tablet(s) Oral daily  oxymetazoline 0.05% Nasal Spray 1 Spray(s) Both Nostrils two times a day  polyethylene glycol 3350 17 Gram(s) Oral daily  potassium chloride   Solution 20 milliEquivalent(s) Oral once    MEDICATIONS  (PRN):  artificial tears (preservative free) Ophthalmic Solution 1 Drop(s) Both EYES four times a day PRN Dry Eyes  dextrose 40% Gel 15 Gram(s) Oral once PRN Blood Glucose LESS THAN 70 milliGRAM(s)/deciliter  glucagon  Injectable 1 milliGRAM(s) IntraMuscular once PRN Glucose LESS THAN 70 milligrams/deciliter        OBJECTIVE:    I&O's Detail    18 Dec 2019 07:  -  19 Dec 2019 07:00  --------------------------------------------------------  IN:    Oral Fluid: 480 mL  Total IN: 480 mL    OUT:  Total OUT: 0 mL    Total NET: 480 mL      19 Dec 2019 07:  -  19 Dec 2019 10:56  --------------------------------------------------------  IN:    Oral Fluid: 420 mL  Total IN: 420 mL    OUT:  Total OUT: 0 mL    Total NET: 420 mL       Daily Weight in k.1 (19 Dec 2019 07:46)    POCT Blood Glucose.: 276 mg/dL (19 Dec 2019 07:46)  POCT Blood Glucose.: 143 mg/dL (18 Dec 2019 22:23)  POCT Blood Glucose.: 260 mg/dL (18 Dec 2019 18:31)  POCT Blood Glucose.: 250 mg/dL (18 Dec 2019 17:09)  POCT Blood Glucose.: 237 mg/dL (18 Dec 2019 13:10)  POCT Blood Glucose.: 215 mg/dL (18 Dec 2019 12:07)      PHYSICAL EXAM:       ICU Vital Signs Last 24 Hrs  T(C): 36.8 (19 Dec 2019 05:21), Max: 37.1 (18 Dec 2019 19:37)  T(F): 98.2 (19 Dec 2019 05:21), Max: 98.8 (18 Dec 2019 19:37)  HR: 91 (19 Dec 2019 05:21) (80 - 174)  BP: 142/77 (19 Dec 2019 05:21) (130/84 - 175/89)  BP(mean): --  ABP: --  ABP(mean): --  RR: 20 (19 Dec 2019 05:21) (18 - 30)  SpO2: 98% (19 Dec 2019 05:21) (95% - 98%) on 4lpm nasal canula     General: Awake. Alert. Cooperative. No distress. Appears stated age. 	  HEENT: Atraumatic. Normocephalic. Anicteric. Normal oral mucosa. PERRL. EOMI. Decreased nasal mucosal injection and erythema.  Neck: Supple. Trachea midline. Thyroid without enlargement/tenderness/nodules. No carotid bruit. No JVD. Short and wide  Cardiovascular: Irregularly irregular and tachycardic rate and rhythm. S1 S2 normal. No murmurs, rubs or gallops.  Respiratory: Respirations unlabored. No wheeze. No curvature.  Abdomen: Soft. Non-tender. Non-distended. No organomegaly. No masses. Normal bowel sounds. Obese.  Extremities: Warm to touch. No clubbing or cyanosis. No pedal edema.  Pulses: 2+ peripheral pulses all extremities.	  Skin: Ecchymoses around right eye and on right chin/neck. Hematoma on the top of the right sided of the head.  Lymph Nodes: Cervical, supraclavicular and axillary nodes normal  Neurological: Motor and sensory examination equal and normal. A and O x 2  Psychiatry: Appropriate mood and affect.    LABS:                          13.9   8.95  )-----------( 243      ( 19 Dec 2019 06:58 )             41.6     CBC    WBC  8.95 <==, 10.18 <==, 11.66 <==, 5.82 <==, 5.62 <==, 4.58 <==    Hemoglobin  13.9 <<==, 14.2 <<==, 14.0 <<==, 12.2 <<==, 11.7 <<==, 12.3 <<==    Hematocrit  41.6 <==, 43.9 <==, 42.6 <==, 37.3 <==, 35.7 <==, 37.6 <==    Platelets  243 <==, 269 <==, 237 <==, 186 <==, 180 <==, 164 <==      146<H>  |  102  |  29<H>  ----------------------------<  292<H>    12-19  3.7   |  22  |  1.25      LYTES    sodium  146 <==, 142 <==, 142 <==, 142 <==, 143 <==, 142 <==    potassium   3.7 <==, 3.3 <==, 3.4 <==, 3.5 <==, 3.6 <==, 4.3 <==    chloride  102 <==, 104 <==, 104 <==, 103 <==, 103 <==, 103 <==    carbon dioxide  22 <==, 22 <==, 21 <==, 21 <==, 19 <==, 22 <==    =============================================================================================  RENAL FUNCTION:    Creatinine:   1.25  <<==, 1.23  <<==, 1.04  <<==, 1.03  <<==, 1.17  <<==, 1.21  <<==    BUN:   29 <==, 27 <==, 21 <==, 23 <==, 27 <==, 32 <==    ============================================================================================    calcium   8.9 <==, 9.1 <==, 8.7 <==, 8.6 <==, 8.4 <==, 8.7 <==    phos   2.5 <==    mag   1.9 <==, 1.9 <==, 1.5 <==    ============================================================================================  LFTs    AST:   40 <==     ALT:  31  <==     AP:  58  <=    Bili:  0.4  <=      PT/INR - ( 15 Dec 2019 15:36 )   PT: 13.7 sec;   INR: 1.20 ratio      Venous Blood Gas:  18 @ 18:47  7.48/33/109/24/98  VBG Lactate: 1.8    CARDIAC MARKERS ( 15 Dec 2019 20:49 )   U/L /CKMB x     /CKMB Units x        troponin x        CARDIAC MARKERS ( 15 Dec 2019 15:36 )   U/L /CKMB x     /CKMB Units x        troponin x        MICROBIOLOGY:     Rapid Respiratory Viral Panel (. @ 10:27)    Rapid RVP Result: NotDetec: This Respiratory Panel uses polymerase chain reaction (PCR) to detect for  adenovirus; coronavirus (HKU1, NL63, 229E, OC43); human metapneumovirus  (hMPV); human enterovirus/rhinovirus (Entero/RV); influenza A; influenza  A/H1; influenza A/H3; influenza A/H1-2009; influenza B; parainfluenza  viruses 1, 2, 3, 4; respiratory syncytial virus; Mycoplasma pneumoniae;  and Chlamydophila pneumoniae.    Urinalysis Basic - ( 15 Dec 2019 18:05 )    Color: Yellow / Appearance: Clear / S.029 / pH: x  Gluc: x / Ketone: Small  / Bili: Negative / Urobili: Negative   Blood: x / Protein: 30 mg/dL / Nitrite: Negative   Leuk Esterase: Negative / RBC: 4 /hpf / WBC 3 /HPF   Sq Epi: x / Non Sq Epi: 2 /hpf / Bacteria: Negative    Culture - Urine (.15.19 @ 22:11)    Specimen Source: .Urine Clean Catch (Midstream)    Culture Results:   Aerococcus species  "Aerococcus spp. are predictably susceptible to penicillin,  ampicillin, tetracycline, and vancomycin.  All isolates are  resistant to sulfonamides"  <10,000 CFU/ml Normal Urogenital pema present    RADIOLOGY:  [x] Chest radiographs reviewed and interpreted by me    EXAM:  XR CHEST AP OR PA 1V                          PROCEDURE DATE:  12/15/2019      INTERPRETATION:  CLINICAL INFORMATION: Cough and fever.    EXAM: AP chest radiograph    COMPARISON: None    FINDINGS:  The heart is normal in size. Calcified aortic knob. No focal   consolidations. No pleural effusion or pneumothorax.  The visualized osseous structures demonstrate no acute pathology.    IMPRESSION:  Clear lungs    KATHY SNOW M.D., RADIOLOGY RESIDENT  This document has been electronically signed.  CAITIE MEJIAS M.D., ATTENDING RADIOLOGIST  This document has been electronically signed. Dec 16 2019  9:38AM  ---------------------------------------------------------------------------------------------------------------    EXAM:  CT ABDOMEN AND PELVIS IC                          EXAM:  CT CHEST IC                          PROCEDURE DATE:  12/15/2019      INTERPRETATION:  CLINICAL INFORMATION: Trauma. Status post fall with   diffuse abdominal tenderness.    COMPARISON: None.    PROCEDURE:   CT of the Chest, Abdomen and Pelvis was performed with intravenous   contrast.   Imaging was performed through the chest in the arterial phase followed by   imaging of the abdomen and pelvis in the portal venous phase.  Intravenous contrast: 90 ml Omnipaque 350. 10 ml discarded.  Oral contrast: None.  Sagittal and coronal reformats were performed.    FINDINGS:    CHEST:     LUNGS AND LARGE AIRWAYS: Patent central airways. No consolidation.  PLEURA: No pleural effusion. No pneumothorax.  VESSELS: Atherosclerotic changes of the aorta and coronary arteries.  HEART: Heart size is normal. No pericardial effusion. Mitral annulus and   aortic valve calcifications.  MEDIASTINUM AND KARLA: No lymphadenopathy.  CHEST WALL AND LOWER NECK: Within normal limits.    ABDOMEN AND PELVIS:    LIVER: Steatosis.  BILE DUCTS: Normal caliber.  GALLBLADDER: Within normal limits.  SPLEEN: Within normal limits.  PANCREAS: Within normal limits.  ADRENALS: 7 mm indeterminate left adrenal nodule.. Right adrenal gland is   unremarkable..  KIDNEYS/URETERS: No renal stones or hydronephrosis. Right upper pole   exophytic complex cystic lesion with irregular thick mural thickening..   Bilateral renal subcentimeter hypodensities which are too small to   characterize. Punctate 2 mm nonobstructing calculus in the midpole of the   left kidney.    BLADDER: 5 cm left and 1 cm right posterior bladder diverticuli.    REPRODUCTIVE ORGANS: Prostate within normal limits.    BOWEL: No bowel obstruction. Appendix is normal.  PERITONEUM: Trace fluid in the right paracolic gutter. No   pneumoperitoneum.  VESSELS: Atherosclerotic changes.  RETROPERITONEUM/LYMPH NODES: No lymphadenopathy.    ABDOMINAL WALL: Small degree of ventral abdominal wall subcutaneous   stranding and skin thickening, left greater the right, likely related to   traumatic contusion. No discrete hematoma. Small bilateral fat-containing   inguinal hernias..  BONES: No acute fracture or dislocation. Degenerative changes.    IMPRESSION:   Trace amount of free fluid in the right paracolic gutter.    No visceral injury is identified.    Ventral abdominal wall subcutaneous stranding and skin thickening, left   greater the right, likely related to traumatic contusion. No discrete   hematoma.     Complex exophytic right renal cystic lesion and indeterminate left   adrenal nodule. Further characterization with nonemergent contrast   enhanced MRI is recommended.    ROULA BACH M.D., RADIOLOGY RESIDENT  This document has been electronically signed.  MARK GUNDERSON M.D., ATTENDING RADIOLOGIST  This document has been electronically signed. Dec 15 2019  6:35PM  ---------------------------------------------------------------------------------------------------------------

## 2019-12-19 NOTE — PROGRESS NOTE ADULT - SUBJECTIVE AND OBJECTIVE BOX
Chief complaint  Patient is a 84y old  Male who presents with a chief complaint of s/p fall (19 Dec 2019 15:04)   Review of systems  Patient in bed, looks comfortable, no fever, no hypoglycemia.    Labs and Fingersticks  CAPILLARY BLOOD GLUCOSE      POCT Blood Glucose.: 336 mg/dL (19 Dec 2019 11:47)  POCT Blood Glucose.: 276 mg/dL (19 Dec 2019 07:46)  POCT Blood Glucose.: 143 mg/dL (18 Dec 2019 22:23)  POCT Blood Glucose.: 260 mg/dL (18 Dec 2019 18:31)  POCT Blood Glucose.: 250 mg/dL (18 Dec 2019 17:09)      Anion Gap, Serum: 22 <H> (12-19 @ 06:58)  Anion Gap, Serum: 16 (12-18 @ 18:51)  Anion Gap, Serum: 17 (12-18 @ 09:29)    Hemoglobin A1C, Whole Blood: 8.3 <H> (12-18 @ 22:55)    Calcium, Total Serum: 8.9 (12-19 @ 06:58)  Calcium, Total Serum: 9.1 (12-18 @ 18:51)  Calcium, Total Serum: 8.7 (12-18 @ 09:29)          12-19    146<H>  |  102  |  29<H>  ----------------------------<  292<H>  3.7   |  22  |  1.25    Ca    8.9      19 Dec 2019 06:58  Phos  2.5     12-18  Mg     1.9     12-18                          13.9   8.95  )-----------( 243      ( 19 Dec 2019 06:58 )             41.6     Medications  MEDICATIONS  (STANDING):  acetaminophen  IVPB .. 1000 milliGRAM(s) IV Intermittent once  cholecalciferol 2000 Unit(s) Oral daily  dextrose 5%. 1000 milliLiter(s) (50 mL/Hr) IV Continuous <Continuous>  dextrose 50% Injectable 12.5 Gram(s) IV Push once  dextrose 50% Injectable 25 Gram(s) IV Push once  dextrose 50% Injectable 25 Gram(s) IV Push once  diltiazem    Tablet 30 milliGRAM(s) Oral every 6 hours  enoxaparin Injectable 90 milliGRAM(s) SubCutaneous once  enoxaparin Injectable 80 milliGRAM(s) SubCutaneous every 12 hours  fluticasone propionate 50 MICROgram(s)/spray Nasal Spray 1 Spray(s) Both Nostrils two times a day  hydrochlorothiazide 12.5 milliGRAM(s) Oral daily  insulin glargine Injectable (LANTUS) 62 Unit(s) SubCutaneous at bedtime  insulin lispro (HumaLOG) corrective regimen sliding scale   SubCutaneous three times a day before meals  insulin lispro (HumaLOG) corrective regimen sliding scale   SubCutaneous at bedtime  insulin lispro Injectable (HumaLOG) 14 Unit(s) SubCutaneous three times a day before meals  labetalol 200 milliGRAM(s) Oral two times a day  loratadine 10 milliGRAM(s) Oral daily  losartan 100 milliGRAM(s) Oral daily  multivitamin/minerals 1 Tablet(s) Oral daily  polyethylene glycol 3350 17 Gram(s) Oral daily  potassium chloride   Solution 20 milliEquivalent(s) Oral once  tamsulosin 0.4 milliGRAM(s) Oral at bedtime      Physical Exam  General: Patient comfortable in bed  Vital Signs Last 12 Hrs  T(F): 97.4 (12-19-19 @ 11:43), Max: 98.2 (12-19-19 @ 05:21)  HR: 105 (12-19-19 @ 11:43) (91 - 105)  BP: 125/85 (12-19-19 @ 11:43) (125/85 - 142/77)  BP(mean): --  RR: 19 (12-19-19 @ 11:43) (19 - 20)  SpO2: 94% (12-19-19 @ 11:43) (94% - 98%)  Neck: No palpable thyroid nodules.  CVS: S1S2, No murmurs  Respiratory: No wheezing, no crepitations  GI: Abdomen soft, bowel sounds positive  Musculoskeletal:  edema lower extremities.   Skin: No skin rashes, no ecchymosis    Diagnostics Chief complaint  Patient is a 84y old  Male who presents with a chief complaint of s/p fall (19 Dec 2019 15:04)   Review of systems  Patient in bed, looks comfortable, no fever,  no hypoglycemia.    Labs and Fingersticks  CAPILLARY BLOOD GLUCOSE      POCT Blood Glucose.: 336 mg/dL (19 Dec 2019 11:47)  POCT Blood Glucose.: 276 mg/dL (19 Dec 2019 07:46)  POCT Blood Glucose.: 143 mg/dL (18 Dec 2019 22:23)  POCT Blood Glucose.: 260 mg/dL (18 Dec 2019 18:31)  POCT Blood Glucose.: 250 mg/dL (18 Dec 2019 17:09)      Anion Gap, Serum: 22 <H> (12-19 @ 06:58)  Anion Gap, Serum: 16 (12-18 @ 18:51)  Anion Gap, Serum: 17 (12-18 @ 09:29)    Hemoglobin A1C, Whole Blood: 8.3 <H> (12-18 @ 22:55)    Calcium, Total Serum: 8.9 (12-19 @ 06:58)  Calcium, Total Serum: 9.1 (12-18 @ 18:51)  Calcium, Total Serum: 8.7 (12-18 @ 09:29)          12-19    146<H>  |  102  |  29<H>  ----------------------------<  292<H>  3.7   |  22  |  1.25    Ca    8.9      19 Dec 2019 06:58  Phos  2.5     12-18  Mg     1.9     12-18                          13.9   8.95  )-----------( 243      ( 19 Dec 2019 06:58 )             41.6     Medications  MEDICATIONS  (STANDING):  acetaminophen  IVPB .. 1000 milliGRAM(s) IV Intermittent once  cholecalciferol 2000 Unit(s) Oral daily  dextrose 5%. 1000 milliLiter(s) (50 mL/Hr) IV Continuous <Continuous>  dextrose 50% Injectable 12.5 Gram(s) IV Push once  dextrose 50% Injectable 25 Gram(s) IV Push once  dextrose 50% Injectable 25 Gram(s) IV Push once  diltiazem    Tablet 30 milliGRAM(s) Oral every 6 hours  enoxaparin Injectable 90 milliGRAM(s) SubCutaneous once  enoxaparin Injectable 80 milliGRAM(s) SubCutaneous every 12 hours  fluticasone propionate 50 MICROgram(s)/spray Nasal Spray 1 Spray(s) Both Nostrils two times a day  hydrochlorothiazide 12.5 milliGRAM(s) Oral daily  insulin glargine Injectable (LANTUS) 62 Unit(s) SubCutaneous at bedtime  insulin lispro (HumaLOG) corrective regimen sliding scale   SubCutaneous three times a day before meals  insulin lispro (HumaLOG) corrective regimen sliding scale   SubCutaneous at bedtime  insulin lispro Injectable (HumaLOG) 14 Unit(s) SubCutaneous three times a day before meals  labetalol 200 milliGRAM(s) Oral two times a day  loratadine 10 milliGRAM(s) Oral daily  losartan 100 milliGRAM(s) Oral daily  multivitamin/minerals 1 Tablet(s) Oral daily  polyethylene glycol 3350 17 Gram(s) Oral daily  potassium chloride   Solution 20 milliEquivalent(s) Oral once  tamsulosin 0.4 milliGRAM(s) Oral at bedtime      Physical Exam  General: Patient comfortable in bed  Vital Signs Last 12 Hrs  T(F): 97.4 (12-19-19 @ 11:43), Max: 98.2 (12-19-19 @ 05:21)  HR: 105 (12-19-19 @ 11:43) (91 - 105)  BP: 125/85 (12-19-19 @ 11:43) (125/85 - 142/77)  BP(mean): --  RR: 19 (12-19-19 @ 11:43) (19 - 20)  SpO2: 94% (12-19-19 @ 11:43) (94% - 98%)  Neck: No palpable thyroid nodules.  CVS: S1S2, No murmurs  Respiratory: No wheezing, no crepitations  GI: Abdomen soft, bowel sounds positive  Musculoskeletal:  edema lower extremities.   Skin: No skin rashes, no ecchymosis    Diagnostics

## 2019-12-19 NOTE — SWALLOW BEDSIDE ASSESSMENT ADULT - ASR SWALLOW ASPIRATION MONITOR
gurgly voice/fever/pneumonia/throat clearing/change of breathing pattern/cough/upper respiratory infection
upper respiratory infection/change of breathing pattern/cough/gurgly voice/throat clearing/fever/pneumonia

## 2019-12-19 NOTE — SWALLOW BEDSIDE ASSESSMENT ADULT - SLP GENERAL OBSERVATIONS
Pt encountered awake and alert, reclined in bed on room air. HOB elevated prior to evaluation. A&Ox1; pt stated he was at a [employer] restaurant. When provided a semantic cue, pt stated he was at acupuncture. Pt son and sister at bedside. Per family, pt mental status altered at this time. Not aware of president, year, children's names, etc. which is NOT baseline. Team ordered stat CT head. Patient endorsed dysphagia with liquids stating, "I have trouble with water, it feels loose and I cough." No prior history of pna, respiratory complications, weight loss, GERD, or odynophagia.
Pt encountered bedside, AA&Ox2(person, place) and required cues for month/year. Pt able to follow simple directives and answer yes/no questions re: current hospitalization as well as biographical information. Pt demonstrated slight incoordination of respiration and deglutition with occasional audible breathing/exhalations during mastication of solids. Educated Pt re: aspiration precautions and importance of slow rate/small bites and allowing rest breaks to avoid risk of aspiration. Due to slight impulsivity, suggested mechanical soft due to suspected fatigue and greater risk of incoordination during mastication of a soft/hard solids at this time. Pt expressed an understanding of aspiration precautions and safe swallowing strategies.

## 2019-12-19 NOTE — SWALLOW BEDSIDE ASSESSMENT ADULT - PHARYNGEAL PHASE
Within functional limits Decreased laryngeal elevation Decreased laryngeal elevation/Cough post oral intake/Throat clear post oral intake

## 2019-12-19 NOTE — SWALLOW BEDSIDE ASSESSMENT ADULT - SWALLOW EVAL: DIAGNOSIS
Pt seen for f/u of diet tolerance and assess for candidacy for upgrade. SHIN Caceres and GONZALO Gutiérrez both approved session. Pt presents with evidence of a suspected pharyngeal phase dysphagia, likely due to incoordination of respiration and deglutition. Pt demonstrated rapid, uncontrolled volume/intake with thin liquids which likely contributed to immediate cough response post thins. No overt signs or symptoms of penetration/aspiration on purees, mechanical soft, soft solids, nectar or honey thickened liquids. Pt encouraged to take small bites/sips at a slow rate. Would suggest conservative texture of mechanical soft with nectar at this time. Pt seen for f/u of diet tolerance and assess for candidacy for upgrade. SHIN Caceres and NP Marla both approved session. Pt seen by Pulm immediately prior to SLP.   Per pulm, "respirations unlabored. No wheeze. No curvature. rhinorrhea, post-nasal drip, nasal congestion -> improved. likely obstructive sleep apnea."  Upon today's bedside swallow evaluation, pt presents with evidence of a suspected pharyngeal phase dysphagia, likely due to incoordination of respiration and deglutition. Pt demonstrated rapid, uncontrolled volume/intake with thin liquids which likely contributed to immediate cough response post thins. No overt signs or symptoms of penetration/aspiration on purees, mechanical soft, soft solids, nectar or honey thickened liquids. Pt encouraged to take small bites/sips at a slow rate. Would suggest conservative texture of mechanical soft with nectar at this time.

## 2019-12-19 NOTE — SWALLOW BEDSIDE ASSESSMENT ADULT - SLP PERTINENT HISTORY OF CURRENT PROBLEM
83 yo male PMhx DM on insulin, HTN, BPH, HLD presents to the ED with unwitnessed fall the day prior with inability to get up and ambulate. Pt reports he slipped off the bed the day prior onto floor and was unable to get up.  Wife attempted to help him up but was unable, so placed pillow under head and pt slept on floor overnight. This AM pt was still unable to get up so EMS was called. Patient states he uses a cane to ambulate outside the house, but ambulate independently.  [facility] in bruising and hematoma R chin, abd, and R frontal scalp, evaluated here in ED, and discharged home.  Patient has been feeling congested for the past couple days, decrease PO intake, and GRANT.  Patient denies fever (but on Ibuprofen 800 3x daily for back and leg pain), chest pain, cough, LOC, dysuria or abd pain.  No sick contact.  c/o gen weakness and inability to ambulate
83 yo male PMhx DM on insulin, HTN, BPH, HLD presents to the ED with unwitnessed fall the day prior with inability to get up and ambulate. Pt reports he slipped off the bed the day prior onto floor and was unable to get up.  Wife attempted to help him up but was unable, so placed pillow under head and pt slept on floor overnight. This AM pt was still unable to get up so EMS was called. Patient states he uses a cane to ambulate outside the house, but ambulate independently.  [facility] in bruising and hematoma R chin, abd, and R frontal scalp, evaluated here in ED, and discharged home.  Patient has been feeling congested for the past couple days, decrease PO intake, and GRANT.  Patient denies fever (but on Ibuprofen 800 3x daily for back and leg pain), chest pain, cough, LOC, dysuria or abd pain.  No sick contact.  c/o gen weakness and inability to ambulate
83 yo male PMhx DM on insulin, HTN, BPH, HLD presents to the ED with unwitnessed fall the day prior with inability to get up and ambulate. Pt reports he slipped off the bed the day prior onto floor and was unable to get up.  Wife attempted to help him up but was unable, so placed pillow under head and pt slept on floor overnight. This AM pt was still unable to get up so EMS was called. Patient states he uses a cane to ambulate outside the house, but ambulate independently.  [facility] in bruising and hematoma R chin, abd, and R frontal scalp, evaluated here in ED, and discharged home.  Patient has been feeling congested for the past couple days, decrease PO intake, and GRANT.  Patient denies fever (but on Ibuprofen 800 3x daily for back and leg pain), chest pain, cough, LOC, dysuria or abd pain.  No sick contact.  c/o gen weakness and inability to ambulate

## 2019-12-19 NOTE — PROGRESS NOTE ADULT - ASSESSMENT
ASSESSMENT:    atrial fibrillation with RVR -> likely related to beta-agonist stimulating medications    confusion and leukocytosis related to systemic steroids -> improved -> no evidence of acute CNS pathology on brain CT    rhinorrhea, post-nasal drip, nasal congestion -> URI/sinusitis although RVP is negative -> improved    bronchospasm -> resolved    likely obstructive sleep apnea    recurrent falls with mild elevation in CPK    HTN/HLD/DM    PLAN/RECOMMENDATIONS:    stable oxygenation on room air  discontinue zosyn  observe systemic steroids  discontinue albuterol/atrovent/pulmicort nebs  flonase/claritin - Afrin and sudafed discontinued  DVT prophylaxis  glucose control  out of bed and into the chair  physical therapy evaluation noted  outpatient balance and strengthening training  flomax  cardiology evaluation noted  cardiac meds: diltiazem/labetalol/cozaar/HCTZ    Will follow with you. Plan of care discussed with the patient at bedside and the dedicated floor NP.    Ignacio Rollins MD, [facility]  [phone]  Pulmonary Medicine ASSESSMENT:    atrial fibrillation with RVR -> likely related to beta-agonist stimulating medications    confusion and leukocytosis related to systemic steroids -> improved -> no evidence of acute CNS pathology on brain CT    rhinorrhea, post-nasal drip, nasal congestion -> URI/sinusitis although RVP is negative -> improved    bronchospasm -> resolved    likely obstructive sleep apnea    recurrent falls with mild elevation in CPK    HTN/HLD/DM    PLAN/RECOMMENDATIONS:    stable oxygenation on room air  discontinue zosyn  observe off systemic steroids  discontinue albuterol/atrovent/pulmicort nebs  flonase/claritin - Afrin and sudafed discontinued  DVT prophylaxis  glucose control  out of bed and into the chair  physical therapy evaluation noted  outpatient balance and strengthening training  flomax  cardiology evaluation noted  cardiac meds: diltiazem/labetalol/cozaar/HCTZ    Will follow with you. Plan of care discussed with the patient at bedside and the dedicated floor NP.    Ignacio Rollins MD, [facility]  [phone]  Pulmonary Medicine

## 2019-12-20 LAB
ANION GAP SERPL CALC-SCNC: 15 MMOL/L — SIGNIFICANT CHANGE UP (ref 5–17)
BUN SERPL-MCNC: 28 MG/DL — HIGH (ref 7–23)
CALCIUM SERPL-MCNC: 8.8 MG/DL — SIGNIFICANT CHANGE UP (ref 8.4–10.5)
CHLORIDE SERPL-SCNC: 104 MMOL/L — SIGNIFICANT CHANGE UP (ref 96–108)
CO2 SERPL-SCNC: 23 MMOL/L — SIGNIFICANT CHANGE UP (ref 22–31)
CREAT SERPL-MCNC: 1.29 MG/DL — SIGNIFICANT CHANGE UP (ref 0.5–1.3)
GLUCOSE BLDC GLUCOMTR-MCNC: 176 MG/DL — HIGH (ref 70–99)
GLUCOSE BLDC GLUCOMTR-MCNC: 202 MG/DL — HIGH (ref 70–99)
GLUCOSE BLDC GLUCOMTR-MCNC: 213 MG/DL — HIGH (ref 70–99)
GLUCOSE BLDC GLUCOMTR-MCNC: 250 MG/DL — HIGH (ref 70–99)
GLUCOSE SERPL-MCNC: 185 MG/DL — HIGH (ref 70–99)
HCT VFR BLD CALC: 43.4 % — SIGNIFICANT CHANGE UP (ref 39–50)
HGB BLD-MCNC: 13.9 G/DL — SIGNIFICANT CHANGE UP (ref 13–17)
MAGNESIUM SERPL-MCNC: 1.8 MG/DL — SIGNIFICANT CHANGE UP (ref 1.6–2.6)
MCHC RBC-ENTMCNC: 30.3 PG — SIGNIFICANT CHANGE UP (ref 27–34)
MCHC RBC-ENTMCNC: 32 GM/DL — SIGNIFICANT CHANGE UP (ref 32–36)
MCV RBC AUTO: 94.8 FL — SIGNIFICANT CHANGE UP (ref 80–100)
PLATELET # BLD AUTO: 218 K/UL — SIGNIFICANT CHANGE UP (ref 150–400)
POTASSIUM SERPL-MCNC: 3.1 MMOL/L — LOW (ref 3.5–5.3)
POTASSIUM SERPL-SCNC: 3.1 MMOL/L — LOW (ref 3.5–5.3)
RBC # BLD: 4.58 M/UL — SIGNIFICANT CHANGE UP (ref 4.2–5.8)
RBC # FLD: 13.2 % — SIGNIFICANT CHANGE UP (ref 10.3–14.5)
SODIUM SERPL-SCNC: 142 MMOL/L — SIGNIFICANT CHANGE UP (ref 135–145)
WBC # BLD: 11.39 K/UL — HIGH (ref 3.8–10.5)
WBC # FLD AUTO: 11.39 K/UL — HIGH (ref 3.8–10.5)

## 2019-12-20 PROCEDURE — 93306 TTE W/DOPPLER COMPLETE: CPT | Mod: 26

## 2019-12-20 PROCEDURE — 99233 SBSQ HOSP IP/OBS HIGH 50: CPT

## 2019-12-20 RX ORDER — POTASSIUM CHLORIDE 20 MEQ
10 PACKET (EA) ORAL
Refills: 0 | Status: COMPLETED | OUTPATIENT
Start: 2019-12-20 | End: 2019-12-20

## 2019-12-20 RX ORDER — POTASSIUM CHLORIDE 20 MEQ
40 PACKET (EA) ORAL ONCE
Refills: 0 | Status: DISCONTINUED | OUTPATIENT
Start: 2019-12-20 | End: 2019-12-20

## 2019-12-20 RX ORDER — ENOXAPARIN SODIUM 100 MG/ML
90 INJECTION SUBCUTANEOUS EVERY 12 HOURS
Refills: 0 | Status: DISCONTINUED | OUTPATIENT
Start: 2019-12-20 | End: 2019-12-24

## 2019-12-20 RX ADMIN — Medication 60 MILLIGRAM(S): at 05:02

## 2019-12-20 RX ADMIN — Medication 200 MILLIGRAM(S): at 17:48

## 2019-12-20 RX ADMIN — Medication 200 MILLIGRAM(S): at 05:08

## 2019-12-20 RX ADMIN — Medication 100 MILLIEQUIVALENT(S): at 17:48

## 2019-12-20 RX ADMIN — Medication 100 MILLIEQUIVALENT(S): at 14:58

## 2019-12-20 RX ADMIN — Medication 2000 UNIT(S): at 11:48

## 2019-12-20 RX ADMIN — Medication 4: at 07:54

## 2019-12-20 RX ADMIN — Medication 1 SPRAY(S): at 17:47

## 2019-12-20 RX ADMIN — Medication 4: at 17:17

## 2019-12-20 RX ADMIN — Medication 1 SPRAY(S): at 05:04

## 2019-12-20 RX ADMIN — Medication 14 UNIT(S): at 17:18

## 2019-12-20 RX ADMIN — Medication 60 MILLIGRAM(S): at 21:14

## 2019-12-20 RX ADMIN — Medication 60 MILLIGRAM(S): at 13:08

## 2019-12-20 RX ADMIN — INSULIN GLARGINE 62 UNIT(S): 100 INJECTION, SOLUTION SUBCUTANEOUS at 22:01

## 2019-12-20 RX ADMIN — POLYETHYLENE GLYCOL 3350 17 GRAM(S): 17 POWDER, FOR SOLUTION ORAL at 17:48

## 2019-12-20 RX ADMIN — LORATADINE 10 MILLIGRAM(S): 10 TABLET ORAL at 11:48

## 2019-12-20 RX ADMIN — ENOXAPARIN SODIUM 80 MILLIGRAM(S): 100 INJECTION SUBCUTANEOUS at 05:02

## 2019-12-20 RX ADMIN — Medication 2: at 12:02

## 2019-12-20 RX ADMIN — ENOXAPARIN SODIUM 90 MILLIGRAM(S): 100 INJECTION SUBCUTANEOUS at 17:48

## 2019-12-20 RX ADMIN — Medication 100 MILLIEQUIVALENT(S): at 15:26

## 2019-12-20 RX ADMIN — Medication 1 TABLET(S): at 11:48

## 2019-12-20 RX ADMIN — TAMSULOSIN HYDROCHLORIDE 0.4 MILLIGRAM(S): 0.4 CAPSULE ORAL at 21:15

## 2019-12-20 RX ADMIN — LOSARTAN POTASSIUM 100 MILLIGRAM(S): 100 TABLET, FILM COATED ORAL at 05:08

## 2019-12-20 RX ADMIN — Medication 12.5 MILLIGRAM(S): at 05:07

## 2019-12-20 NOTE — PROGRESS NOTE ADULT - ASSESSMENT
ASSESSMENT:    atrial fibrillation with RVR -> likely related to beta-agonist medications    confusion and leukocytosis related to systemic steroids -> improved -> no evidence of acute CNS pathology on brain CT    rhinorrhea, post-nasal drip, nasal congestion -> URI/sinusitis although RVP is negative -> improved    bronchospasm -> resolved    likely obstructive sleep apnea    recurrent falls with mild elevation in CPK    HTN/HLD/DM    PLAN/RECOMMENDATIONS:    stable oxygenation on room air  observe off antibiotics  observe off systemic steroids  observe off nebs  flonase/astelin (not on formulary)/claritin   cardiology evaluation noted - for KATHLEEN/DCCV  cardiac meds: diltiazem/labetalol/cozaar/HCTZ/full dose lovenox  DVT prophylaxis  glucose control  out of bed and into the chair  physical therapy evaluation noted  outpatient balance and strengthening training  flomax    Will follow with you. Plan of care discussed with the patient at bedside and the dedicated floor NP.    Ignacio Rollins MD, [facility]  [phone]  Pulmonary Medicine

## 2019-12-20 NOTE — PROGRESS NOTE ADULT - SUBJECTIVE AND OBJECTIVE BOX
Infectious Diseases progress note:    Subjective: Afebrile.  WBC 11.3.  No cough/sob/cp/congestion/sore throat.  Pt awaiting KATHLEEN/DCCV.  Off abx as of 12/19    ROS:  CONSTITUTIONAL:  No fever, chills, rigors  CARDIOVASCULAR:  No chest pain or palpitations  RESPIRATORY:   No SOB, cough, dyspnea on exertion.  No wheezing  GASTROINTESTINAL:  No abd pain, N/V, diarrhea/constipation  EXTREMITIES:  No swelling or joint pain  GENITOURINARY:  No burning on urination, increased frequency or urgency.  No flank pain  NEUROLOGIC:  No HA, visual disturbances  SKIN: No rashes    Allergies    No Known Allergies    Intolerances        ANTIBIOTICS/RELEVANT:  antimicrobials    immunologic:    OTHER:  acetaminophen  IVPB .. 1000 milliGRAM(s) IV Intermittent once  artificial tears (preservative free) Ophthalmic Solution 1 Drop(s) Both EYES four times a day PRN  cholecalciferol 2000 Unit(s) Oral daily  dextrose 40% Gel 15 Gram(s) Oral once PRN  dextrose 5%. 1000 milliLiter(s) IV Continuous <Continuous>  dextrose 50% Injectable 12.5 Gram(s) IV Push once  dextrose 50% Injectable 25 Gram(s) IV Push once  dextrose 50% Injectable 25 Gram(s) IV Push once  diltiazem    Tablet 60 milliGRAM(s) Oral every 8 hours  enoxaparin Injectable 90 milliGRAM(s) SubCutaneous once  enoxaparin Injectable 90 milliGRAM(s) SubCutaneous every 12 hours  fluticasone propionate 50 MICROgram(s)/spray Nasal Spray 1 Spray(s) Both Nostrils two times a day  glucagon  Injectable 1 milliGRAM(s) IntraMuscular once PRN  hydrochlorothiazide 12.5 milliGRAM(s) Oral daily  insulin glargine Injectable (LANTUS) 62 Unit(s) SubCutaneous at bedtime  insulin lispro (HumaLOG) corrective regimen sliding scale   SubCutaneous three times a day before meals  insulin lispro (HumaLOG) corrective regimen sliding scale   SubCutaneous at bedtime  insulin lispro Injectable (HumaLOG) 14 Unit(s) SubCutaneous three times a day before meals  labetalol 200 milliGRAM(s) Oral two times a day  loratadine 10 milliGRAM(s) Oral daily  losartan 100 milliGRAM(s) Oral daily  multivitamin/minerals 1 Tablet(s) Oral daily  polyethylene glycol 3350 17 Gram(s) Oral daily  potassium chloride   Solution 20 milliEquivalent(s) Oral once  potassium chloride  10 mEq/100 mL IVPB 10 milliEquivalent(s) IV Intermittent every 1 hour  tamsulosin 0.4 milliGRAM(s) Oral at bedtime      Objective:  Vital Signs Last 24 Hrs  T(C): 36.3 (21 Dec 2019 11:19), Max: 36.8 (20 Dec 2019 20:59)  T(F): 97.4 (21 Dec 2019 11:19), Max: 98.3 (20 Dec 2019 20:59)  HR: 103 (21 Dec 2019 11:19) (84 - 103)  BP: 95/59 (21 Dec 2019 11:19) (95/59 - 131/81)  BP(mean): --  RR: 18 (21 Dec 2019 11:19) (18 - 18)  SpO2: 99% (21 Dec 2019 11:19) (96% - 99%)    PHYSICAL EXAM:  Constitutional:NAD  Eyes:CURTIS, EOMI  Ear/Nose/Throat: no thrush, mucositis.  Moist mucous membranes	  Neck:no JVD, no lymphadenopathy, supple  Respiratory: CTA jamila  Cardiovascular: S1S2 RRR, no murmurs  Gastrointestinal:soft, nontender,  nondistended (+) BS  Extremities:no e/e/c  Skin:  no rashes, open wounds or ulcerations        LABS:             Complete Blood Count in AM (12.20.19 @ 08:56)    WBC Count: 11.39 K/uL    RBC Count: 4.58 M/uL    Hemoglobin: 13.9 g/dL    Hematocrit: 43.4 %    Mean Cell Volume: 94.8 fl    Mean Cell Hemoglobin: 30.3 pg    Mean Cell Hemoglobin Conc: 32.0 gm/dL    Red Cell Distrib Width: 13.2 %    Platelet Count - Automated: 218 K/uL      Mg     1.8     12-20                      Rapid RVP Result: NotDetec  Rapid RVP Result: NotDetec          MICROBIOLOGY:    Culture - Blood (12.18.19 @ 22:01)    Specimen Source: .Blood Blood-Peripheral    Culture Results:   No growth to date.    Culture - Blood (12.18.19 @ 22:01)    Specimen Source: .Blood Blood    Culture Results:   No growth to date.    Culture - Blood (12.16.19 @ 17:34)    Specimen Source: .Blood Blood-Peripheral    Culture Results:   No growth to date.          RADIOLOGY & ADDITIONAL STUDIES:    < from: CT Head No Cont (12.18.19 @ 21:00) >  FINDINGS: There is no acute intracranial hemorrhage, mass effect, shift of the midline structures, herniation, extra-axial fluid collection, or hydrocephalus.    There is diffuse cerebral volume loss with prominence of the sulci, fissures, and cisternal spaces which is normal for the patient's age. 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    The paranasal sinuses and mastoid air cells are clear. The calvarium is intact. A mild amount of right frontal scalp soft tissue swelling is again appreciated. There is evidence of bilateral cataract removal.    IMPRESSION: No acute intracranial hemorrhage, mass effect, or shift of the midline structures.    < end of copied text >      < from: Xray Chest 1 View AP/PA (12.18.19 @ 19:10) >  FINDINGS:     The lungs are clear.    The heart size is normal.    Degenerative changes of the spine.    IMPRESSION:    Clear lungs.    < end of copied text >

## 2019-12-20 NOTE — PROGRESS NOTE ADULT - ASSESSMENT
83 yo male PMhx DM on insulin, HTN, BPH, HLD p/w generalized weakness and inability to ambulate after a fall, congestion with GRANT     congestion/wheezing  likely URI  abx stopped by pulshira yang  pulm follow up  improved     fevers  id consulted  check blood and urine cult - ngtd  abx now stopped   resolved     afib with rvr  AC with lovenox bid  rate control with cardizem  f/u echo  cont tele monitor  cards f/u  plan for dccv today

## 2019-12-20 NOTE — PROGRESS NOTE ADULT - SUBJECTIVE AND OBJECTIVE BOX
[facility] - [facility] - PROGRESS NOTE    CHIEF COMPLAINT: CHIEF COMPLAINT: sinusitis; bronchospasm; rhinorrhea; nasal congestion; post nasal drip; AF with RVR; s/p fall    INTERVAL HISTORY: tele -> atrial fibrillation/flutter with controlled rate; awaiting KATHLEEN/DCCV; mental status almost back to baseline off systemic steroids but remains somewhat confused; no chest pain/pressure or palpitations; much less rhinorrhea, nasal congestion and post-nasal drip; no shortness of breath room air; no cough or sputum production; no chest congestion or wheeze; no fevers, chills or sweats;    REVIEW OF SYSTEMS:  Constitutional: As per interval history  HEENT: As per interval history  CV: As per interval history  Resp: As per interval history  GI: Within normal limits   : Within normal limits  Musculoskeletal: Within normal limits  Skin: Within normal limits  Neurological: Within normal limits  Psychiatric: confusion -> improved  Endocrine: Within normal limits  Hematologic/Lymphatic: Within normal limits  Allergic/Immunologic: Within normal limits    MEDICATIONS:     Pulmonary "  loratadine 10 milliGRAM(s) Oral daily    Anti-microbials:    Cardiovascular:  diltiazem    Tablet 60 milliGRAM(s) Oral every 8 hours  hydrochlorothiazide 12.5 milliGRAM(s) Oral daily  labetalol 200 milliGRAM(s) Oral two times a day  losartan 100 milliGRAM(s) Oral daily  tamsulosin 0.4 milliGRAM(s) Oral at bedtime    Other:  acetaminophen  IVPB .. 1000 milliGRAM(s) IV Intermittent once  cholecalciferol 2000 Unit(s) Oral daily  dextrose 5%. 1000 milliLiter(s) IV Continuous <Continuous>  dextrose 50% Injectable 12.5 Gram(s) IV Push once  dextrose 50% Injectable 25 Gram(s) IV Push once  dextrose 50% Injectable 25 Gram(s) IV Push once  enoxaparin Injectable 90 milliGRAM(s) SubCutaneous once  enoxaparin Injectable 80 milliGRAM(s) SubCutaneous every 12 hours  fluticasone propionate 50 MICROgram(s)/spray Nasal Spray 1 Spray(s) Both Nostrils two times a day  insulin glargine Injectable (LANTUS) 62 Unit(s) SubCutaneous at bedtime  insulin lispro (HumaLOG) corrective regimen sliding scale   SubCutaneous three times a day before meals  insulin lispro (HumaLOG) corrective regimen sliding scale   SubCutaneous at bedtime  insulin lispro Injectable (HumaLOG) 14 Unit(s) SubCutaneous three times a day before meals  multivitamin/minerals 1 Tablet(s) Oral daily  polyethylene glycol 3350 17 Gram(s) Oral daily  potassium chloride   Solution 20 milliEquivalent(s) Oral once    MEDICATIONS  (PRN):  artificial tears (preservative free) Ophthalmic Solution 1 Drop(s) Both EYES four times a day PRN Dry Eyes  dextrose 40% Gel 15 Gram(s) Oral once PRN Blood Glucose LESS THAN 70 milliGRAM(s)/deciliter  glucagon  Injectable 1 milliGRAM(s) IntraMuscular once PRN Glucose LESS THAN 70 milligrams/deciliter        OBJECTIVE:    I&O's Detail    19 Dec 2019 07:01  -  20 Dec 2019 07:00  --------------------------------------------------------  IN:    Oral Fluid: 1500 mL  Total IN: 1500 mL    OUT:  Total OUT: 0 mL    Total NET: 1500 mL       Daily Weight in k.1 (20 Dec 2019 07:33)    POCT Blood Glucose.: 202 mg/dL (20 Dec 2019 07:49)  POCT Blood Glucose.: 198 mg/dL (19 Dec 2019 21:34)  POCT Blood Glucose.: 104 mg/dL (19 Dec 2019 17:44)  POCT Blood Glucose.: 80 mg/dL (19 Dec 2019 17:11)  POCT Blood Glucose.: 64 mg/dL (19 Dec 2019 16:46)  POCT Blood Glucose.: 63 mg/dL (19 Dec 2019 16:44)  POCT Blood Glucose.: 336 mg/dL (19 Dec 2019 11:47)      PHYSICAL EXAM:       ICU Vital Signs Last 24 Hrs  T(C): 37 (20 Dec 2019 05:05), Max: 37 (20 Dec 2019 05:05)  T(F): 98.6 (20 Dec 2019 05:05), Max: 98.6 (20 Dec 2019 05:05)  HR: 130 (20 Dec 2019 05:05) (88 - 130)  BP: 121/73 (20 Dec 2019 05:05) (110/74 - 125/85)  BP(mean): --  ABP: --  ABP(mean): --  RR: 18 (20 Dec 2019 05:05) (18 - 19)  SpO2: 94% (20 Dec 2019 05:05) (94% - 97%) on room air     General: Awake. Alert. Cooperative. No distress. Appears stated age. 	  HEENT: Atraumatic. Normocephalic. Anicteric. Normal oral mucosa. PERRL. EOMI. Decreased nasal mucosal injection and erythema.  Neck: Supple. Trachea midline. Thyroid without enlargement/tenderness/nodules. No carotid bruit. No JVD. Short and wide  Cardiovascular: Irregularly irregular rate and rhythm. S1 S2 normal. No murmurs, rubs or gallops.  Respiratory: Respirations unlabored. No wheeze. No curvature.  Abdomen: Soft. Non-tender. Non-distended. No organomegaly. No masses. Normal bowel sounds. Obese.  Extremities: Warm to touch. No clubbing or cyanosis. No pedal edema.  Pulses: 2+ peripheral pulses all extremities.	  Skin: Ecchymoses around right eye and on right chin/neck. Hematoma on the top of the right sided of the head.  Lymph Nodes: Cervical, supraclavicular and axillary nodes normal  Neurological: Motor and sensory examination equal and normal. A and O x 2  Psychiatry: Appropriate mood and affect.    LABS:                          13.9   11.39 )-----------( 218      ( 20 Dec 2019 08:56 )             43.4     CBC    WBC  11.39 <==, 8.95 <==, 10.18 <==, 11.66 <==, 5.82 <==, 5.62 <==, 4.58 <==    Hemoglobin  13.9 <<==, 13.9 <<==, 14.2 <<==, 14.0 <<==, 12.2 <<==, 11.7 <<==, 12.3 <<==    Hematocrit  43.4 <==, 41.6 <==, 43.9 <==, 42.6 <==, 37.3 <==, 35.7 <==, 37.6 <==    Platelets  218 <==, 243 <==, 269 <==, 237 <==, 186 <==, 180 <==, 164 <==      142  |  104  |  28<H>  ----------------------------<  185<H>    12-20  3.1<L>   |  23  |  1.29      LYTES    sodium  142 <==, 146 <==, 142 <==, 142 <==, 142 <==, 143 <==, 142 <==    potassium   3.1 <==, 3.7 <==, 3.3 <==, 3.4 <==, 3.5 <==, 3.6 <==, 4.3 <==    chloride  104 <==, 102 <==, 104 <==, 104 <==, 103 <==, 103 <==, 103 <==    carbon dioxide  23 <==, 22 <==, 22 <==, 21 <==, 21 <==, 19 <==, 22 <==    =============================================================================================  RENAL FUNCTION:    Creatinine:   1.29  <<==, 1.25  <<==, 1.23  <<==, 1.04  <<==, 1.03  <<==, 1.17  <<==, 1.21  <<==    BUN:   28 <==, 29 <==, 27 <==, 21 <==, 23 <==, 27 <==, 32 <==    ============================================================================================    calcium   8.8 <==, 8.9 <==, 9.1 <==, 8.7 <==, 8.6 <==, 8.4 <==, 8.7 <==    phos   2.5 <==    mag   1.8 <==, 1.9 <==, 1.9 <==, 1.5 <==    ============================================================================================  LFTs    AST:   40 <==     ALT:  31  <==     AP:  58  <=    Bili:  0.4  <=      PT/INR - ( 15 Dec 2019 15:36 )   PT: 13.7 sec;   INR: 1.20 ratio      Venous Blood Gas:   @ 18:47  7.48/33/109/24/98  VBG Lactate: 1.8    CARDIAC MARKERS ( 15 Dec 2019 20:49 )   U/L /CKMB x     /CKMB Units x        troponin x        CARDIAC MARKERS ( 15 Dec 2019 15:36 )   U/L /CKMB x     /CKMB Units x        troponin x        MICROBIOLOGY:     Rapid Respiratory Viral Panel (12.16.19 @ 10:27)    Rapid RVP Result: NotDetec: This Respiratory Panel uses polymerase chain reaction (PCR) to detect for  adenovirus; coronavirus (HKU1, NL63, 229E, OC43); human metapneumovirus  (hMPV); human enterovirus/rhinovirus (Entero/RV); influenza A; influenza  A/H1; influenza A/H3; influenza A/H1-2009; influenza B; parainfluenza  viruses 1, 2, 3, 4; respiratory syncytial virus; Mycoplasma pneumoniae;  and Chlamydophila pneumoniae.    Urinalysis Basic - ( 15 Dec 2019 18:05 )    Color: Yellow / Appearance: Clear / S.029 / pH: x  Gluc: x / Ketone: Small  / Bili: Negative / Urobili: Negative   Blood: x / Protein: 30 mg/dL / Nitrite: Negative   Leuk Esterase: Negative / RBC: 4 /hpf / WBC 3 /HPF   Sq Epi: x / Non Sq Epi: 2 /hpf / Bacteria: Negative    Culture - Urine (.15. @ 22:11)    Specimen Source: .Urine Clean Catch (Midstream)    Culture Results:   Aerococcus species  "Aerococcus spp. are predictably susceptible to penicillin,  ampicillin, tetracycline, and vancomycin.  All isolates are  resistant to sulfonamides"  <10,000 CFU/ml Normal Urogenital pema present    RADIOLOGY:  [x] Chest radiographs reviewed and interpreted by me    EXAM:  XR CHEST AP OR PA 1V                          PROCEDURE DATE:  12/15/2019      INTERPRETATION:  CLINICAL INFORMATION: Cough and fever.    EXAM: AP chest radiograph    COMPARISON: None    FINDINGS:  The heart is normal in size. Calcified aortic knob. No focal   consolidations. No pleural effusion or pneumothorax.  The visualized osseous structures demonstrate no acute pathology.    IMPRESSION:  Clear lungs    KATHY SNOW M.D., RADIOLOGY RESIDENT  This document has been electronically signed.  CAITIE MEJIAS M.D., ATTENDING RADIOLOGIST  This document has been electronically signed. Dec 16 2019  9:38AM  ---------------------------------------------------------------------------------------------------------------    EXAM:  CT ABDOMEN AND PELVIS IC                          EXAM:  CT CHEST IC                          PROCEDURE DATE:  12/15/2019      INTERPRETATION:  CLINICAL INFORMATION: Trauma. Status post fall with   diffuse abdominal tenderness.    COMPARISON: None.    PROCEDURE:   CT of the Chest, Abdomen and Pelvis was performed with intravenous   contrast.   Imaging was performed through the chest in the arterial phase followed by   imaging of the abdomen and pelvis in the portal venous phase.  Intravenous contrast: 90 ml Omnipaque 350. 10 ml discarded.  Oral contrast: None.  Sagittal and coronal reformats were performed.    FINDINGS:    CHEST:     LUNGS AND LARGE AIRWAYS: Patent central airways. No consolidation.  PLEURA: No pleural effusion. No pneumothorax.  VESSELS: Atherosclerotic changes of the aorta and coronary arteries.  HEART: Heart size is normal. No pericardial effusion. Mitral annulus and   aortic valve calcifications.  MEDIASTINUM AND KARLA: No lymphadenopathy.  CHEST WALL AND LOWER NECK: Within normal limits.    ABDOMEN AND PELVIS:    LIVER: Steatosis.  BILE DUCTS: Normal caliber.  GALLBLADDER: Within normal limits.  SPLEEN: Within normal limits.  PANCREAS: Within normal limits.  ADRENALS: 7 mm indeterminate left adrenal nodule.. Right adrenal gland is   unremarkable..  KIDNEYS/URETERS: No renal stones or hydronephrosis. Right upper pole   exophytic complex cystic lesion with irregular thick mural thickening..   Bilateral renal subcentimeter hypodensities which are too small to   characterize. Punctate 2 mm nonobstructing calculus in the midpole of the   left kidney.    BLADDER: 5 cm left and 1 cm right posterior bladder diverticuli.    REPRODUCTIVE ORGANS: Prostate within normal limits.    BOWEL: No bowel obstruction. Appendix is normal.  PERITONEUM: Trace fluid in the right paracolic gutter. No   pneumoperitoneum.  VESSELS: Atherosclerotic changes.  RETROPERITONEUM/LYMPH NODES: No lymphadenopathy.    ABDOMINAL WALL: Small degree of ventral abdominal wall subcutaneous   stranding and skin thickening, left greater the right, likely related to   traumatic contusion. No discrete hematoma. Small bilateral fat-containing   inguinal hernias..  BONES: No acute fracture or dislocation. Degenerative changes.    IMPRESSION:   Trace amount of free fluid in the right paracolic gutter.    No visceral injury is identified.    Ventral abdominal wall subcutaneous stranding and skin thickening, left   greater the right, likely related to traumatic contusion. No discrete   hematoma.     Complex exophytic right renal cystic lesion and indeterminate left   adrenal nodule. Further characterization with nonemergent contrast   enhanced MRI is recommended.    ROULA BACH M.D., RADIOLOGY RESIDENT  This document has been electronically signed.  MARK GUNDERSON M.D., ATTENDING RADIOLOGIST  This document has been electronically signed. Dec 15 2019  6:35PM  ---------------------------------------------------------------------------------------------------------------

## 2019-12-20 NOTE — PROGRESS NOTE ADULT - ASSESSMENT
83 yo male PMhx DM on insulin, HTN, BPH, HLD presents to the ED with unwitnessed fall the day prior with inability to get up and ambulate. Pt reports he slipped off the bed the day prior onto floor and was unable to get up.  Wife attempted to help him up but was unable, so placed pillow under head and pt slept on floor overnight. This AM pt was still unable to get up so EMS was called. Patient states he uses a cane to ambulate outside the house, but ambulate independently.  Patient had a trip and fall outside the house in [facility], resulting in bruising and hematoma R chin, abd, and R frontal scalp, evaluated here in ED, and discharged home.  Patient has been feeling congested for the past couple days, decrease PO intake, and GRANT.  Patient denies fever (but on Ibuprofen 800 3x daily for back and leg pain), chest pain, cough, LOC, dysuria or abd pain.  No sick contact.  c/o gen weakness and inability to ambulate (15 Dec 2019 22:05)    ER vitals:  T 98.2, P 104, /98.  Tmax 100.8 (R).  No leukocytosis.  No acute fx on CT imaging.  CT c/a/p no visceral injuries, no consolidation, no infectious focus identified.  UA (-) nit/LE.  RVP (-) x 2.      Pt seen by Pulm for c/o  rhinorrhea, post-nasal drip, nasal congestion.  Started on treatment for URI with bronchospasm although RVP (-).   -> URI although RVP is negative.  Pt started on Prednisone 50mg Qdaily and augmentin 500mg PO bid.     ID consult called for evaluation of fever.     Fever:    - Possible URI given congestion with post-nasal drip, pt with wheezing.  Started on steroids and empiric abx.  No consolidations on CT imaging.      - Pt s/p completion of augmentin.    - s/p RRT on 12/18 for rapid A.fib - transferred to telemetry.   Pt currently afebrile, normal WBC.  Pt without new localizing symptoms on clinical exam.  Denies productive cough, congestion/sore throat.  Repeat bcx from 12/18 negative.  Repeat cxr 12/18 shows clear lungs.   Cont to monitor off abx.    * Pt planned for KATHLEEN/DCCV      Will follow,    Zenia Berg  [phone]

## 2019-12-20 NOTE — PROGRESS NOTE ADULT - ASSESSMENT
Assessment  DMT2: 84y Male with DM T2 with hyperglycemia, A1C 7.9%, was on oral meds and insulin at home, now on basal bolus insulin, increased dose yesterday, patient had hypoglycemic episode yesterday afternoon, blood sugars now improved, no new hypoglycemic episode. Patient appears more alert today, family by the bedside, patient is NPO for KATHLEEN.  URI: on management, stable, monitored.  Frequent Falls: On fall precautions, PT eval.  HTN: Controlled,  on antihypertensive medications.  HLD: Controlled, on statin.          Gentry Cole MD  Cell: [phone]  Office: [phone] Assessment  DMT2: 84y Male with DM T2 with hyperglycemia, A1C 7.9%, was on oral meds and insulin at home, now on basal bolus insulin, increased dose yesterday, patient had hypoglycemic episode yesterday afternoon, blood sugars now improved, no new hypoglycemic episode.  Patient appears more alert today, family by the bedside, patient is NPO for KATHLEEN.  URI: on management, stable, monitored.  Frequent Falls: On fall precautions, PT eval.  HTN: Controlled,  on antihypertensive medications.  HLD: Controlled, on statin.          Gentry Cole MD  Cell: [phone]  Office: [phone]

## 2019-12-20 NOTE — PROGRESS NOTE ADULT - ASSESSMENT
84 year-old gentleman with multiple cardiovascular risk factors as above presents with falls and weakness of unclear etiology.  Airways sound tight, but patient is not grossly volume overloaded.  No evidence of recent ACS or decompensated heart failure.    New onset atrial fibrillation is likely unrelated to his fall.    Request EP consult for cardioversion on Monday.  NPO after midnight on Sunday night for KATHLEEN and DCCV on Monday.  Continue therapeutic anticoagulation with plans to discharge on Xarelto 20 mg daily or Eliquis 5 mg BID for patient with age > 80, weight > 60 kg, and creatinine < 1.5 mg/dL.    I will be covered by Avinash Campo MD this weekend. Please call him with any active cardiac issues. 84 year-old gentleman with multiple cardiovascular risk factors as above presents with falls and weakness of unclear etiology.  Airways sound tight, but patient is not grossly volume overloaded.  No evidence of recent ACS or decompensated heart failure.    New onset atrial fibrillation is likely unrelated to his fall.    Request EP consult for cardioversion on Monday.  Uptitrate diltiazem as tolerated. Can change beta-blocker from labetalol to more cardioselective metoprolol for additional antiarrhythmic effect if needed.  NPO after midnight on Sunday night for KATHLEEN and DCCV on Monday.  Continue therapeutic anticoagulation with plans to discharge on Xarelto 20 mg daily or Eliquis 5 mg BID for patient with age > 80, weight > 60 kg, and creatinine < 1.5 mg/dL.    I will be covered by Avinash Campo MD this weekend. Please call him with any active cardiac issues.

## 2019-12-20 NOTE — PROGRESS NOTE ADULT - SUBJECTIVE AND OBJECTIVE BOX
HOLLIE STEVENS  84y Male  MRN:[id number]    Patient is a 84y old  Male who presents with a chief complaint of s/p fall (15 Dec 2019 22:05)    HPI:  85 yo male PMhx DM on insulin, HTN, BPH, HLD presents to the ED with unwitnessed fall the day prior with inability to get up and ambulate. Pt reports he slipped off the bed the day prior onto floor and was unable to get up.  Wife attempted to help him up but was unable, so placed pillow under head and pt slept on floor overnight. This AM pt was still unable to get up so EMS was called. Patient states he uses a cane to ambulate outside the house, but ambulate independently.  Patient had a trip and [place], resulting in bruising and hematoma R chin, abd, and R frontal scalp, evaluated here in ED, and discharged home.  Patient has been feeling congested for the past couple days, decrease PO intake, and GRNAT.  Patient denies fever (but on Ibuprofen 800 3x daily for back and leg pain), chest pain, cough, LOC, dysuria or abd pain.  No sick contact.  c/o gen weakness and inability to ambulate (15 Dec 2019 22:05)      Patient seen and evaluated at bedside. No acute events overnight except as noted.    Interval HPI: no events o/n  Tele: afib/ flutter     PAST MEDICAL & SURGICAL HISTORY:  Diabetes mellitus type 2 in nonobese  BPH (benign prostatic hyperplasia)  Hyperlipidemia  HTN (hypertension)  H/O arthroscopy: R knee      REVIEW OF SYSTEMS:  as per hpi       VITALS:  Vital Signs Last 24 Hrs  T(C): 37.2 (20 Dec 2019 11:17), Max: 37.2 (20 Dec 2019 11:17)  T(F): 99 (20 Dec 2019 11:17), Max: 99 (20 Dec 2019 11:17)  HR: 67 (20 Dec 2019 11:17) (67 - 130)  BP: 116/68 (20 Dec 2019 11:17) (110/74 - 121/73)  BP(mean): --  RR: 19 (20 Dec 2019 11:17) (18 - 19)  SpO2: 95% (20 Dec 2019 11:17) (94% - 97%)      PHYSICAL EXAM:  GENERAL: NAD, well-developed  HEAD:  +head trauma, +ecchymosis on face/neck  EYES: EOMI, PERRLA, conjunctiva and sclera clear  NECK: Supple, No JVD  CHEST/LUNG: b/l congestion/wheeze   HEART: S1, S2; irreg irreg. No murmurs, rubs, or gallops  ABDOMEN: Soft, Nontender, Nondistended; Bowel sounds present  EXTREMITIES:  2+ Peripheral Pulses, No clubbing, cyanosis, or edema  PSYCH: Normal affect  NEUROLOGY: AAOX3; non-focal  SKIN: No rashes or lesions    Consultant(s) Notes Reviewed:  [x ] YES  [ ] NO  Care Discussed with Consultants/Other Providers [ x] YES  [ ] NO    MEDS:  MEDICATIONS  (STANDING):  acetaminophen  IVPB .. 1000 milliGRAM(s) IV Intermittent once  cholecalciferol 2000 Unit(s) Oral daily  dextrose 5%. 1000 milliLiter(s) (50 mL/Hr) IV Continuous <Continuous>  dextrose 50% Injectable 12.5 Gram(s) IV Push once  dextrose 50% Injectable 25 Gram(s) IV Push once  dextrose 50% Injectable 25 Gram(s) IV Push once  diltiazem    Tablet 60 milliGRAM(s) Oral every 8 hours  enoxaparin Injectable 90 milliGRAM(s) SubCutaneous once  enoxaparin Injectable 80 milliGRAM(s) SubCutaneous every 12 hours  fluticasone propionate 50 MICROgram(s)/spray Nasal Spray 1 Spray(s) Both Nostrils two times a day  hydrochlorothiazide 12.5 milliGRAM(s) Oral daily  insulin glargine Injectable (LANTUS) 62 Unit(s) SubCutaneous at bedtime  insulin lispro (HumaLOG) corrective regimen sliding scale   SubCutaneous three times a day before meals  insulin lispro (HumaLOG) corrective regimen sliding scale   SubCutaneous at bedtime  insulin lispro Injectable (HumaLOG) 14 Unit(s) SubCutaneous three times a day before meals  labetalol 200 milliGRAM(s) Oral two times a day  loratadine 10 milliGRAM(s) Oral daily  losartan 100 milliGRAM(s) Oral daily  multivitamin/minerals 1 Tablet(s) Oral daily  polyethylene glycol 3350 17 Gram(s) Oral daily  potassium chloride   Solution 20 milliEquivalent(s) Oral once  tamsulosin 0.4 milliGRAM(s) Oral at bedtime    MEDICATIONS  (PRN):  artificial tears (preservative free) Ophthalmic Solution 1 Drop(s) Both EYES four times a day PRN Dry Eyes  dextrose 40% Gel 15 Gram(s) Oral once PRN Blood Glucose LESS THAN 70 milliGRAM(s)/deciliter  glucagon  Injectable 1 milliGRAM(s) IntraMuscular once PRN Glucose LESS THAN 70 milligrams/deciliter        ALLERGIES:  No Known Allergies      LABS:                                      13.9   11.39 )-----------( 218      ( 20 Dec 2019 08:56 )             43.4   12-20    142  |  104  |  28<H>  ----------------------------<  185<H>  3.1<L>   |  23  |  1.29    Ca    8.8      20 Dec 2019 06:06  Phos  2.5     12-18  Mg     1.8     12-20          < from: Xray Chest 1 View- PORTABLE-Urgent (12.16.19 @ 12:45) >  IMPRESSION:    Clear lungs.    < end of copied text >         < from: CT Thoracic Spine Reform No Cont (12.15.19 @ 17:20) >    IMPRESSION:   CT BRAIN: No acute intracranial bleeding, mass effect, or shift.   Resolving right frontal scalp hematoma, now small compared to prior CT   head from 11/28/2019.     CT CERVICAL SPINE: No acute fracture subluxation. Multilevel   spondylosis..     CT THORACIC SPINE: No acute fracture subluxation. Multilevel spondylosis.   Increased density along the left posterior lateral aspect of the central   canal at the T6-7 level possibly representing a meningioma. Follow-up   thoracic spine MRI is recommended.  Partially imaged abdomen demonstrates right renal hypodensity and nodular   thickening of the bilateral adrenal glands better evaluated on same day   CT abdomen pelvis.     CT LUMBAR SPINE: No acute fracture subluxation. Multilevel spondylosis.    < end of copied text >

## 2019-12-20 NOTE — PHARMACOTHERAPY INTERVENTION NOTE - COMMENTS
Dosing weight 87.6 kg, scr 1.26, calculated crcl ccg - 54 mL/min.    Recommend enoxaparin 90 mg subcutaneously q12h.

## 2019-12-20 NOTE — PROGRESS NOTE ADULT - SUBJECTIVE AND OBJECTIVE BOX
Chief complaint  Patient is a 84y old  Male who presents with a chief complaint of s/p fall (20 Dec 2019 12:52)   Review of systems  Patient in bed, looks comfortable, no fever, had hypoglycemic episode yesterday afternoon (FS 63, 64).    Labs and Fingersticks  CAPILLARY BLOOD GLUCOSE      POCT Blood Glucose.: 176 mg/dL (20 Dec 2019 12:00)  POCT Blood Glucose.: 202 mg/dL (20 Dec 2019 07:49)  POCT Blood Glucose.: 198 mg/dL (19 Dec 2019 21:34)  POCT Blood Glucose.: 104 mg/dL (19 Dec 2019 17:44)  POCT Blood Glucose.: 80 mg/dL (19 Dec 2019 17:11)  POCT Blood Glucose.: 64 mg/dL (19 Dec 2019 16:46)  POCT Blood Glucose.: 63 mg/dL (19 Dec 2019 16:44)      Anion Gap, Serum: 15 (12-20 @ 06:06)  Anion Gap, Serum: 22 <H> (12-19 @ 06:58)  Anion Gap, Serum: 16 (12-18 @ 18:51)    Hemoglobin A1C, Whole Blood: 8.3 <H> (12-18 @ 22:55)    Calcium, Total Serum: 8.8 (12-20 @ 06:06)  Calcium, Total Serum: 8.9 (12-19 @ 06:58)  Calcium, Total Serum: 9.1 (12-18 @ 18:51)          12-20    142  |  104  |  28<H>  ----------------------------<  185<H>  3.1<L>   |  23  |  1.29    Ca    8.8      20 Dec 2019 06:06  Phos  2.5     12-18  Mg     1.8     12-20                          13.9   11.39 )-----------( 218      ( 20 Dec 2019 08:56 )             43.4     Medications  MEDICATIONS  (STANDING):  acetaminophen  IVPB .. 1000 milliGRAM(s) IV Intermittent once  cholecalciferol 2000 Unit(s) Oral daily  dextrose 5%. 1000 milliLiter(s) (50 mL/Hr) IV Continuous <Continuous>  dextrose 50% Injectable 12.5 Gram(s) IV Push once  dextrose 50% Injectable 25 Gram(s) IV Push once  dextrose 50% Injectable 25 Gram(s) IV Push once  diltiazem    Tablet 60 milliGRAM(s) Oral every 8 hours  enoxaparin Injectable 90 milliGRAM(s) SubCutaneous once  enoxaparin Injectable 80 milliGRAM(s) SubCutaneous every 12 hours  fluticasone propionate 50 MICROgram(s)/spray Nasal Spray 1 Spray(s) Both Nostrils two times a day  hydrochlorothiazide 12.5 milliGRAM(s) Oral daily  insulin glargine Injectable (LANTUS) 62 Unit(s) SubCutaneous at bedtime  insulin lispro (HumaLOG) corrective regimen sliding scale   SubCutaneous three times a day before meals  insulin lispro (HumaLOG) corrective regimen sliding scale   SubCutaneous at bedtime  insulin lispro Injectable (HumaLOG) 14 Unit(s) SubCutaneous three times a day before meals  labetalol 200 milliGRAM(s) Oral two times a day  loratadine 10 milliGRAM(s) Oral daily  losartan 100 milliGRAM(s) Oral daily  multivitamin/minerals 1 Tablet(s) Oral daily  polyethylene glycol 3350 17 Gram(s) Oral daily  potassium chloride   Solution 20 milliEquivalent(s) Oral once  tamsulosin 0.4 milliGRAM(s) Oral at bedtime      Physical Exam  General: Patient comfortable in bed  Vital Signs Last 12 Hrs  T(F): 99 (12-20-19 @ 11:17), Max: 99 (12-20-19 @ 11:17)  HR: 67 (12-20-19 @ 11:17) (67 - 130)  BP: 116/68 (12-20-19 @ 11:17) (116/68 - 121/73)  BP(mean): --  RR: 19 (12-20-19 @ 11:17) (18 - 19)  SpO2: 95% (12-20-19 @ 11:17) (94% - 95%)  Neck: No palpable thyroid nodules.  CVS: S1S2, No murmurs  Respiratory: No wheezing, no crepitations  GI: Abdomen soft, bowel sounds positive  Musculoskeletal:  edema lower extremities.   Skin: No skin rashes, no ecchymosis    Diagnostics Chief complaint  Patient is a 84y old  Male who presents with a chief complaint of s/p fall (20 Dec 2019 12:52)   Review of systems  Patient in bed, looks comfortable, no fever,  had hypoglycemic episode yesterday afternoon (FS 63, 64).    Labs and Fingersticks  CAPILLARY BLOOD GLUCOSE      POCT Blood Glucose.: 176 mg/dL (20 Dec 2019 12:00)  POCT Blood Glucose.: 202 mg/dL (20 Dec 2019 07:49)  POCT Blood Glucose.: 198 mg/dL (19 Dec 2019 21:34)  POCT Blood Glucose.: 104 mg/dL (19 Dec 2019 17:44)  POCT Blood Glucose.: 80 mg/dL (19 Dec 2019 17:11)  POCT Blood Glucose.: 64 mg/dL (19 Dec 2019 16:46)  POCT Blood Glucose.: 63 mg/dL (19 Dec 2019 16:44)      Anion Gap, Serum: 15 (12-20 @ 06:06)  Anion Gap, Serum: 22 <H> (12-19 @ 06:58)  Anion Gap, Serum: 16 (12-18 @ 18:51)    Hemoglobin A1C, Whole Blood: 8.3 <H> (12-18 @ 22:55)    Calcium, Total Serum: 8.8 (12-20 @ 06:06)  Calcium, Total Serum: 8.9 (12-19 @ 06:58)  Calcium, Total Serum: 9.1 (12-18 @ 18:51)          12-20    142  |  104  |  28<H>  ----------------------------<  185<H>  3.1<L>   |  23  |  1.29    Ca    8.8      20 Dec 2019 06:06  Phos  2.5     12-18  Mg     1.8     12-20                          13.9   11.39 )-----------( 218      ( 20 Dec 2019 08:56 )             43.4     Medications  MEDICATIONS  (STANDING):  acetaminophen  IVPB .. 1000 milliGRAM(s) IV Intermittent once  cholecalciferol 2000 Unit(s) Oral daily  dextrose 5%. 1000 milliLiter(s) (50 mL/Hr) IV Continuous <Continuous>  dextrose 50% Injectable 12.5 Gram(s) IV Push once  dextrose 50% Injectable 25 Gram(s) IV Push once  dextrose 50% Injectable 25 Gram(s) IV Push once  diltiazem    Tablet 60 milliGRAM(s) Oral every 8 hours  enoxaparin Injectable 90 milliGRAM(s) SubCutaneous once  enoxaparin Injectable 80 milliGRAM(s) SubCutaneous every 12 hours  fluticasone propionate 50 MICROgram(s)/spray Nasal Spray 1 Spray(s) Both Nostrils two times a day  hydrochlorothiazide 12.5 milliGRAM(s) Oral daily  insulin glargine Injectable (LANTUS) 62 Unit(s) SubCutaneous at bedtime  insulin lispro (HumaLOG) corrective regimen sliding scale   SubCutaneous three times a day before meals  insulin lispro (HumaLOG) corrective regimen sliding scale   SubCutaneous at bedtime  insulin lispro Injectable (HumaLOG) 14 Unit(s) SubCutaneous three times a day before meals  labetalol 200 milliGRAM(s) Oral two times a day  loratadine 10 milliGRAM(s) Oral daily  losartan 100 milliGRAM(s) Oral daily  multivitamin/minerals 1 Tablet(s) Oral daily  polyethylene glycol 3350 17 Gram(s) Oral daily  potassium chloride   Solution 20 milliEquivalent(s) Oral once  tamsulosin 0.4 milliGRAM(s) Oral at bedtime      Physical Exam  General: Patient comfortable in bed  Vital Signs Last 12 Hrs  T(F): 99 (12-20-19 @ 11:17), Max: 99 (12-20-19 @ 11:17)  HR: 67 (12-20-19 @ 11:17) (67 - 130)  BP: 116/68 (12-20-19 @ 11:17) (116/68 - 121/73)  BP(mean): --  RR: 19 (12-20-19 @ 11:17) (18 - 19)  SpO2: 95% (12-20-19 @ 11:17) (94% - 95%)  Neck: No palpable thyroid nodules.  CVS: S1S2, No murmurs  Respiratory: No wheezing, no crepitations  GI: Abdomen soft, bowel sounds positive  Musculoskeletal:  edema lower extremities.   Skin: No skin rashes, no ecchymosis    Diagnostics

## 2019-12-20 NOTE — PROGRESS NOTE ADULT - SUBJECTIVE AND OBJECTIVE BOX
HPI:  Patient seen and examined at bedside on 4 [place].  Patient remains in rate controlled AFib.    Review Of Systems:           Respiratory: No shortness of breath, cough, or wheezing  Cardiovascular: No chest pain or palpitations  10 point review of systems is otherwise negative except as mentioned above        Medications:  acetaminophen  IVPB .. 1000 milliGRAM(s) IV Intermittent once  artificial tears (preservative free) Ophthalmic Solution 1 Drop(s) Both EYES four times a day PRN  cholecalciferol 2000 Unit(s) Oral daily  dextrose 40% Gel 15 Gram(s) Oral once PRN  dextrose 5%. 1000 milliLiter(s) IV Continuous <Continuous>  dextrose 50% Injectable 12.5 Gram(s) IV Push once  dextrose 50% Injectable 25 Gram(s) IV Push once  dextrose 50% Injectable 25 Gram(s) IV Push once  diltiazem    Tablet 60 milliGRAM(s) Oral every 8 hours  enoxaparin Injectable 90 milliGRAM(s) SubCutaneous once  enoxaparin Injectable 90 milliGRAM(s) SubCutaneous every 12 hours  fluticasone propionate 50 MICROgram(s)/spray Nasal Spray 1 Spray(s) Both Nostrils two times a day  glucagon  Injectable 1 milliGRAM(s) IntraMuscular once PRN  hydrochlorothiazide 12.5 milliGRAM(s) Oral daily  insulin glargine Injectable (LANTUS) 62 Unit(s) SubCutaneous at bedtime  insulin lispro (HumaLOG) corrective regimen sliding scale   SubCutaneous three times a day before meals  insulin lispro (HumaLOG) corrective regimen sliding scale   SubCutaneous at bedtime  insulin lispro Injectable (HumaLOG) 14 Unit(s) SubCutaneous three times a day before meals  labetalol 200 milliGRAM(s) Oral two times a day  loratadine 10 milliGRAM(s) Oral daily  losartan 100 milliGRAM(s) Oral daily  multivitamin/minerals 1 Tablet(s) Oral daily  polyethylene glycol 3350 17 Gram(s) Oral daily  potassium chloride   Solution 20 milliEquivalent(s) Oral once  tamsulosin 0.4 milliGRAM(s) Oral at bedtime    PAST MEDICAL & SURGICAL HISTORY:  Diabetes mellitus type 2 in nonobese  BPH (benign prostatic hyperplasia)  Hyperlipidemia  HTN (hypertension)  H/O arthroscopy: R knee    Vitals:  T(C): 36.8 (19 @ 20:59), Max: 37.2 (19 @ 11:17)  HR: 87 (19 @ 20:59) (67 - 130)  BP: 123/76 (19 @ 20:59) (116/68 - 123/76)  BP(mean): --  RR: 18 (19 @ 20:59) (18 - 19)  SpO2: 96% (19 @ 20:59) (94% - 96%)  Wt(kg): --  Daily     Daily Weight in k.1 (20 Dec 2019 07:33)  I&O's Summary    19 Dec 2019 07:  -  20 Dec 2019 07:00  --------------------------------------------------------  IN: 1500 mL / OUT: 0 mL / NET: 1500 mL    20 Dec 2019 07:01  -  20 Dec 2019 21:07  --------------------------------------------------------  IN: 120 mL / OUT: 0 mL / NET: 120 mL        Physical Exam:  Appearance: Normal, well groomed, NAD  Eyes: PERRLA, EOMI, pink conjunctiva, no scleral icterus   HENT: Normal oral mucosa; old ecchymoses  Cardiovascular: irregular S1, S2, no murmur, rub, or gallop; no edema; no JVD  Respiratory: Clear to auscultation bilaterally  Gastrointestinal: Soft, non-tender, non-distended, BS+  Musculoskeletal: No clubbing or joint deformity   Neurologic: No focal weakness  Lymphatic: No lymphadenopathy  Psychiatry: AAOx3 with appropriate mood and affect  Skin: No rashes, ecchymoses, or cyanosis                          13.9   11.39 )-----------( 218      ( 20 Dec 2019 08:56 )             43.4     12    142  |  104  |  28<H>  ----------------------------<  185<H>  3.1<L>   |  23  |  1.29    Ca    8.8      20 Dec 2019 06:06  Mg     1.8     12-20              Echo: < from: TTE with Doppler (w/Cont) (19 @ 08:15) >  ------------------------------------------------------------------------  Dimensions:    Normal Values:  LA:     2.7    2.0 - 4.0 cm  Ao:            2.0 - 3.8 cm  SEPTUM: 0.9    0.6 - 1.2 cm  PWT:    0.8  0.6 - 1.1 cm  LVIDd:  3.3    3.0 - 5.6 cm  LVIDs:  2.4    1.8 - 4.0 cm  Derived variables:  LVMI: 40 g/m2  RWT: 0.47  EF (Visual Estimate): 65 %  Doppler Peak Velocity (m/sec): MV=1.7 AoV=1.3  ------------------------------------------------------------------------  Observations:  Mitral Valve: Mitral annular calcification. Calcified  mitral leaflets.  Mean mitral gradient 2.0 mmHg at 89/min.  Aortic Valve/Aorta: Calcified aortic valve with normal  opening.  Mild aortic regurgitation.  Normal aortic root.  Left Atrium: Mild left atrial enlargement.  Left Ventricle: Endocardial visualization enhanced with  intravenous injection of Ultrasonic Enhancing Agent  (Definity).  Normal left ventricular internal dimensions and wall  thicknesses.  Normal left ventricular systolic function. No segmental  wall motion abnormalities.  Right Heart: Normal right atrium. Normal right ventricular  size and systolic function. Normal tricuspid valve. Minimal  tricuspid regurgitation. Normal pulmonic valve.  Pericardium/Pleura: Normal pericardium with no pericardial  effusion.  Hemodynamic: No evidence of pulmonary hypertension.  No PFO seen with color Doppler.  ------------------------------------------------------------------------  Conclusions:  Endocardial visualization enhanced with intravenous  injection of Ultrasonic Enhancing Agent (Definity).  Normal left ventricular systolic function. No segmental  wall motion abnormalities.  ------------------------------------------------------------------------  Confirmed on  2019 - 14:28:17 by Chriss Rowland M.D.  ------------------------------------------------------------------------    < end of copied text >      Interpretation of Telemetry: rate controlled AFib, intermittent flutter with 3:1 AV block

## 2019-12-21 LAB
ANION GAP SERPL CALC-SCNC: 15 MMOL/L — SIGNIFICANT CHANGE UP (ref 5–17)
BUN SERPL-MCNC: 23 MG/DL — SIGNIFICANT CHANGE UP (ref 7–23)
CALCIUM SERPL-MCNC: 9.2 MG/DL — SIGNIFICANT CHANGE UP (ref 8.4–10.5)
CHLORIDE SERPL-SCNC: 100 MMOL/L — SIGNIFICANT CHANGE UP (ref 96–108)
CO2 SERPL-SCNC: 27 MMOL/L — SIGNIFICANT CHANGE UP (ref 22–31)
CREAT SERPL-MCNC: 1.16 MG/DL — SIGNIFICANT CHANGE UP (ref 0.5–1.3)
CULTURE RESULTS: SIGNIFICANT CHANGE UP
CULTURE RESULTS: SIGNIFICANT CHANGE UP
GLUCOSE BLDC GLUCOMTR-MCNC: 122 MG/DL — HIGH (ref 70–99)
GLUCOSE BLDC GLUCOMTR-MCNC: 201 MG/DL — HIGH (ref 70–99)
GLUCOSE BLDC GLUCOMTR-MCNC: 207 MG/DL — HIGH (ref 70–99)
GLUCOSE BLDC GLUCOMTR-MCNC: 216 MG/DL — HIGH (ref 70–99)
GLUCOSE SERPL-MCNC: 210 MG/DL — HIGH (ref 70–99)
HCT VFR BLD CALC: 43 % — SIGNIFICANT CHANGE UP (ref 39–50)
HGB BLD-MCNC: 14 G/DL — SIGNIFICANT CHANGE UP (ref 13–17)
MCHC RBC-ENTMCNC: 30.5 PG — SIGNIFICANT CHANGE UP (ref 27–34)
MCHC RBC-ENTMCNC: 32.6 GM/DL — SIGNIFICANT CHANGE UP (ref 32–36)
MCV RBC AUTO: 93.7 FL — SIGNIFICANT CHANGE UP (ref 80–100)
PLATELET # BLD AUTO: 231 K/UL — SIGNIFICANT CHANGE UP (ref 150–400)
POTASSIUM SERPL-MCNC: 3.4 MMOL/L — LOW (ref 3.5–5.3)
POTASSIUM SERPL-SCNC: 3.4 MMOL/L — LOW (ref 3.5–5.3)
RBC # BLD: 4.59 M/UL — SIGNIFICANT CHANGE UP (ref 4.2–5.8)
RBC # FLD: 13 % — SIGNIFICANT CHANGE UP (ref 10.3–14.5)
SODIUM SERPL-SCNC: 142 MMOL/L — SIGNIFICANT CHANGE UP (ref 135–145)
SPECIMEN SOURCE: SIGNIFICANT CHANGE UP
SPECIMEN SOURCE: SIGNIFICANT CHANGE UP
WBC # BLD: 10.29 K/UL — SIGNIFICANT CHANGE UP (ref 3.8–10.5)
WBC # FLD AUTO: 10.29 K/UL — SIGNIFICANT CHANGE UP (ref 3.8–10.5)

## 2019-12-21 RX ORDER — POTASSIUM CHLORIDE 20 MEQ
10 PACKET (EA) ORAL
Refills: 0 | Status: COMPLETED | OUTPATIENT
Start: 2019-12-21 | End: 2019-12-21

## 2019-12-21 RX ADMIN — Medication 14 UNIT(S): at 08:23

## 2019-12-21 RX ADMIN — Medication 1 TABLET(S): at 12:05

## 2019-12-21 RX ADMIN — Medication 100 MILLIEQUIVALENT(S): at 12:40

## 2019-12-21 RX ADMIN — Medication 14 UNIT(S): at 16:53

## 2019-12-21 RX ADMIN — Medication 60 MILLIGRAM(S): at 13:14

## 2019-12-21 RX ADMIN — Medication 1 SPRAY(S): at 05:02

## 2019-12-21 RX ADMIN — Medication 1 SPRAY(S): at 17:38

## 2019-12-21 RX ADMIN — Medication 4: at 12:06

## 2019-12-21 RX ADMIN — Medication 60 MILLIGRAM(S): at 21:48

## 2019-12-21 RX ADMIN — INSULIN GLARGINE 62 UNIT(S): 100 INJECTION, SOLUTION SUBCUTANEOUS at 21:48

## 2019-12-21 RX ADMIN — Medication 200 MILLIGRAM(S): at 17:38

## 2019-12-21 RX ADMIN — Medication 4: at 16:53

## 2019-12-21 RX ADMIN — POLYETHYLENE GLYCOL 3350 17 GRAM(S): 17 POWDER, FOR SOLUTION ORAL at 12:08

## 2019-12-21 RX ADMIN — Medication 60 MILLIGRAM(S): at 05:00

## 2019-12-21 RX ADMIN — ENOXAPARIN SODIUM 90 MILLIGRAM(S): 100 INJECTION SUBCUTANEOUS at 17:38

## 2019-12-21 RX ADMIN — Medication 14 UNIT(S): at 12:08

## 2019-12-21 RX ADMIN — TAMSULOSIN HYDROCHLORIDE 0.4 MILLIGRAM(S): 0.4 CAPSULE ORAL at 21:48

## 2019-12-21 RX ADMIN — ENOXAPARIN SODIUM 90 MILLIGRAM(S): 100 INJECTION SUBCUTANEOUS at 05:41

## 2019-12-21 RX ADMIN — Medication 12.5 MILLIGRAM(S): at 05:00

## 2019-12-21 RX ADMIN — Medication 100 MILLIEQUIVALENT(S): at 12:09

## 2019-12-21 RX ADMIN — Medication 100 MILLIEQUIVALENT(S): at 16:54

## 2019-12-21 RX ADMIN — Medication 4: at 08:24

## 2019-12-21 RX ADMIN — LORATADINE 10 MILLIGRAM(S): 10 TABLET ORAL at 12:08

## 2019-12-21 RX ADMIN — Medication 200 MILLIGRAM(S): at 05:00

## 2019-12-21 RX ADMIN — Medication 2000 UNIT(S): at 12:05

## 2019-12-21 RX ADMIN — LOSARTAN POTASSIUM 100 MILLIGRAM(S): 100 TABLET, FILM COATED ORAL at 05:00

## 2019-12-21 NOTE — PROGRESS NOTE ADULT - ASSESSMENT
85 yo male PMhx DM on insulin, HTN, BPH, HLD presents to the ED with unwitnessed fall the day prior with inability to get up and ambulate. Pt reports he slipped off the bed the day prior onto floor and was unable to get up.  Wife attempted to help him up but was unable, so placed pillow under head and pt slept on floor overnight. This AM pt was still unable to get up so EMS was called. Patient states he uses a cane to ambulate outside the house, but ambulate independently.  Patient had a trip and fall outside the house in [facility], resulting in bruising and hematoma R chin, abd, and R frontal scalp, evaluated here in ED, and discharged home.  Patient has been feeling congested for the past couple days, decrease PO intake, and GRANT.  Patient denies fever (but on Ibuprofen 800 3x daily for back and leg pain), chest pain, cough, LOC, dysuria or abd pain.  No sick contact.  c/o gen weakness and inability to ambulate (15 Dec 2019 22:05)    ER vitals:  T 98.2, P 104, /98.  Tmax 100.8 (R).  No leukocytosis.  No acute fx on CT imaging.  CT c/a/p no visceral injuries, no consolidation, no infectious focus identified.  UA (-) nit/LE.  RVP (-) x 2.      Pt seen by Pulm for c/o  rhinorrhea, post-nasal drip, nasal congestion.  Started on treatment for URI with bronchospasm although RVP (-).   -> URI although RVP is negative.  Pt started on Prednisone 50mg Qdaily and augmentin 500mg PO bid.     ID consult called for evaluation of fever.     Fever:    - Possible URI given congestion with post-nasal drip, pt with wheezing.  Started on steroids and empiric abx.  No consolidations on CT imaging.      - Pt s/p completion of augmentin.    - s/p RRT on 12/18 for rapid A.fib - transferred to telemetry.   Pt currently afebrile, normal WBC.  Pt without new localizing symptoms on clinical exam.  Denies productive cough, congestion/sore throat.  Repeat bcx from 12/18 negative.  Repeat cxr 12/18 shows clear lungs.   Cont to monitor off abx.    * Pt planned for KATHLEEN/DCCV      Will follow,    Zenia Berg  [phone]

## 2019-12-21 NOTE — PROGRESS NOTE ADULT - SUBJECTIVE AND OBJECTIVE BOX
Chief complaint  Patient is a 84y old  Male who presents with a chief complaint of s/p fall (20 Dec 2019 21:07)   Review of systems  Patient in bed, looks comfortable, no fever,  no hypoglycemia.    Labs and Fingersticks  CAPILLARY BLOOD GLUCOSE      POCT Blood Glucose.: 201 mg/dL (21 Dec 2019 11:48)  POCT Blood Glucose.: 207 mg/dL (21 Dec 2019 07:46)  POCT Blood Glucose.: 250 mg/dL (20 Dec 2019 21:27)  POCT Blood Glucose.: 213 mg/dL (20 Dec 2019 16:42)      Anion Gap, Serum: 15 (12-21 @ 06:09)  Anion Gap, Serum: 15 (12-20 @ 06:06)      Calcium, Total Serum: 9.2 (12-21 @ 06:09)  Calcium, Total Serum: 8.8 (12-20 @ 06:06)          12-21    142  |  100  |  23  ----------------------------<  210<H>  3.4<L>   |  27  |  1.16    Ca    9.2      21 Dec 2019 06:09  Mg     1.8     12-20                          14.0   10.29 )-----------( 231      ( 21 Dec 2019 10:15 )             43.0     Medications  MEDICATIONS  (STANDING):  acetaminophen  IVPB .. 1000 milliGRAM(s) IV Intermittent once  cholecalciferol 2000 Unit(s) Oral daily  dextrose 5%. 1000 milliLiter(s) (50 mL/Hr) IV Continuous <Continuous>  dextrose 50% Injectable 12.5 Gram(s) IV Push once  dextrose 50% Injectable 25 Gram(s) IV Push once  dextrose 50% Injectable 25 Gram(s) IV Push once  diltiazem    Tablet 60 milliGRAM(s) Oral every 8 hours  enoxaparin Injectable 90 milliGRAM(s) SubCutaneous once  enoxaparin Injectable 90 milliGRAM(s) SubCutaneous every 12 hours  fluticasone propionate 50 MICROgram(s)/spray Nasal Spray 1 Spray(s) Both Nostrils two times a day  hydrochlorothiazide 12.5 milliGRAM(s) Oral daily  insulin glargine Injectable (LANTUS) 62 Unit(s) SubCutaneous at bedtime  insulin lispro (HumaLOG) corrective regimen sliding scale   SubCutaneous three times a day before meals  insulin lispro (HumaLOG) corrective regimen sliding scale   SubCutaneous at bedtime  insulin lispro Injectable (HumaLOG) 14 Unit(s) SubCutaneous three times a day before meals  labetalol 200 milliGRAM(s) Oral two times a day  loratadine 10 milliGRAM(s) Oral daily  losartan 100 milliGRAM(s) Oral daily  multivitamin/minerals 1 Tablet(s) Oral daily  polyethylene glycol 3350 17 Gram(s) Oral daily  potassium chloride   Solution 20 milliEquivalent(s) Oral once  potassium chloride  10 mEq/100 mL IVPB 10 milliEquivalent(s) IV Intermittent every 1 hour  tamsulosin 0.4 milliGRAM(s) Oral at bedtime      Physical Exam  General: Patient comfortable in bed  Vital Signs Last 12 Hrs  T(F): 97.4 (12-21-19 @ 11:19), Max: 98.2 (12-21-19 @ 04:25)  HR: 80 (12-21-19 @ 13:32) (80 - 103)  BP: 108/60 (12-21-19 @ 13:32) (95/59 - 131/81)  BP(mean): --  RR: 18 (12-21-19 @ 11:19) (18 - 18)  SpO2: 99% (12-21-19 @ 11:19) (96% - 99%)  Neck: No palpable thyroid nodules.  CVS: S1S2, No murmurs  Respiratory: No wheezing, no crepitations  GI: Abdomen soft, bowel sounds positive  Musculoskeletal:  edema lower extremities.   Skin: No skin rashes, no ecchymosis    Diagnostics

## 2019-12-21 NOTE — PROGRESS NOTE ADULT - SUBJECTIVE AND OBJECTIVE BOX
Infectious Diseases progress note:    Subjective: NAD, afebrile.  No acute o/n events    ROS:  CONSTITUTIONAL:  No fever, chills, rigors  CARDIOVASCULAR:  No chest pain or palpitations  RESPIRATORY:   No SOB, cough, dyspnea on exertion.  No wheezing  GASTROINTESTINAL:  No abd pain, N/V, diarrhea/constipation  EXTREMITIES:  No swelling or joint pain  GENITOURINARY:  No burning on urination, increased frequency or urgency.  No flank pain  NEUROLOGIC:  No HA, visual disturbances  SKIN: No rashes    Allergies    No Known Allergies    Intolerances        ANTIBIOTICS/RELEVANT:  antimicrobials    immunologic:    OTHER:  acetaminophen  IVPB .. 1000 milliGRAM(s) IV Intermittent once  artificial tears (preservative free) Ophthalmic Solution 1 Drop(s) Both EYES four times a day PRN  cholecalciferol 2000 Unit(s) Oral daily  dextrose 40% Gel 15 Gram(s) Oral once PRN  dextrose 5%. 1000 milliLiter(s) IV Continuous <Continuous>  dextrose 50% Injectable 12.5 Gram(s) IV Push once  dextrose 50% Injectable 25 Gram(s) IV Push once  dextrose 50% Injectable 25 Gram(s) IV Push once  diltiazem    Tablet 60 milliGRAM(s) Oral every 8 hours  enoxaparin Injectable 90 milliGRAM(s) SubCutaneous once  enoxaparin Injectable 90 milliGRAM(s) SubCutaneous every 12 hours  fluticasone propionate 50 MICROgram(s)/spray Nasal Spray 1 Spray(s) Both Nostrils two times a day  glucagon  Injectable 1 milliGRAM(s) IntraMuscular once PRN  hydrochlorothiazide 12.5 milliGRAM(s) Oral daily  insulin glargine Injectable (LANTUS) 62 Unit(s) SubCutaneous at bedtime  insulin lispro (HumaLOG) corrective regimen sliding scale   SubCutaneous three times a day before meals  insulin lispro (HumaLOG) corrective regimen sliding scale   SubCutaneous at bedtime  insulin lispro Injectable (HumaLOG) 14 Unit(s) SubCutaneous three times a day before meals  labetalol 200 milliGRAM(s) Oral two times a day  loratadine 10 milliGRAM(s) Oral daily  losartan 100 milliGRAM(s) Oral daily  multivitamin/minerals 1 Tablet(s) Oral daily  polyethylene glycol 3350 17 Gram(s) Oral daily  potassium chloride   Solution 20 milliEquivalent(s) Oral once  potassium chloride  10 mEq/100 mL IVPB 10 milliEquivalent(s) IV Intermittent every 1 hour  tamsulosin 0.4 milliGRAM(s) Oral at bedtime      Objective:  Vital Signs Last 24 Hrs  T(C): 36.3 (21 Dec 2019 11:19), Max: 36.8 (20 Dec 2019 20:59)  T(F): 97.4 (21 Dec 2019 11:19), Max: 98.3 (20 Dec 2019 20:59)  HR: 80 (21 Dec 2019 13:32) (80 - 103)  BP: 108/60 (21 Dec 2019 13:32) (95/59 - 131/81)  BP(mean): --  RR: 18 (21 Dec 2019 11:19) (18 - 18)  SpO2: 99% (21 Dec 2019 11:19) (96% - 99%)    PHYSICAL EXAM:  Constitutional:NAD  Eyes:CURTIS, EOMI  Ear/Nose/Throat: no thrush, mucositis.  Moist mucous membranes	  Neck:no JVD, no lymphadenopathy, supple  Respiratory: CTA jamila  Cardiovascular: S1S2 RRR, no murmurs  Gastrointestinal:soft, nontender,  nondistended (+) BS  Extremities:no e/e/c  Skin:  no rashes, open wounds or ulcerations        LABS:                        14.0   10.29 )-----------( 231      ( 21 Dec 2019 10:15 )             43.0     12-21    142  |  100  |  23  ----------------------------<  210<H>  3.4<L>   |  27  |  1.16    Ca    9.2      21 Dec 2019 06:09  Mg     1.8     12-20                      Rapid RVP Result: NotDetec  Rapid RVP Result: NotDetec          MICROBIOLOGY:          RADIOLOGY & ADDITIONAL STUDIES:

## 2019-12-21 NOTE — PROGRESS NOTE ADULT - ASSESSMENT
83 yo male PMhx DM on insulin, HTN, BPH, HLD p/w generalized weakness and inability to ambulate after a fall, congestion with GRANT     congestion/wheezing  likely URI  abx stopped by pulshira yang  pulm follow up  improved     fevers  id consulted  check blood and urine cult - ngtd  abx now stopped   resolved     afib with rvr  AC with lovenox bid  rate control with cardizem  f/u echo  cont tele monitor  cards f/u  plan for dccv monday

## 2019-12-21 NOTE — CHART NOTE - NSCHARTNOTEFT_GEN_A_CORE
Called by RN to report that pt's son is stating that pt is more confused and sleepy today. Pt seen at bedside. Pt is A&Ox1-2.  As per pt's son and wife, patient  has became progressively more confused during this hospitalization. Pt is currently oriented to self and place. Pt is calm at bedside eating his dinner. No evidence of acute neurological dysfunction on head CT on 12/15 and 12/18. Blood culture negative so far, UA neg. No s/s of infection noted. VSS. Dr. Rodríguez notified. Will continue to monitor.      Shemar Chacon  Spectra-link [id number]

## 2019-12-21 NOTE — PROGRESS NOTE ADULT - ASSESSMENT
Assessment  DMT2: 84y Male with DM T2 with hyperglycemia, A1C 7.9%, was on oral meds and insulin at home, on insulin, no new hypoglycemic episode, blood sugars  improved.   URI: on management, stable, monitored.  Frequent Falls: On fall precautions, PT eval.  HTN: Controlled,  on antihypertensive medications.  HLD: Controlled, on statin.          Gentry Cole MD  Cell: [phone]  Office: [phone]

## 2019-12-21 NOTE — PROGRESS NOTE ADULT - SUBJECTIVE AND OBJECTIVE BOX
HOLLIE STEVENS  84y Male  MRN:[id number]    Patient is a 84y old  Male who presents with a chief complaint of s/p fall (15 Dec 2019 22:05)    HPI:  83 yo male PMhx DM on insulin, HTN, BPH, HLD presents to the ED with unwitnessed fall the day prior with inability to get up and ambulate. Pt reports he slipped off the bed the day prior onto floor and was unable to get up.  Wife attempted to help him up but was unable, so placed pillow under head and pt slept on floor overnight. This AM pt was still unable to get up so EMS was called. Patient states he uses a cane to ambulate outside the house, but ambulate independently.  Patient [place] and [place], resulting in bruising and hematoma R chin, abd, and R frontal scalp, evaluated here in ED, and discharged home.  Patient has been feeling congested for the past couple days, decrease PO intake, and GRANT.  Patient denies fever (but on Ibuprofen 800 3x daily for back and leg pain), chest pain, cough, LOC, dysuria or abd pain.  No sick contact.  c/o gen weakness and inability to ambulate (15 Dec 2019 22:05)      Patient seen and evaluated at bedside. No acute events overnight except as noted.    Interval HPI: no events o/n. +delirium today  Tele: afib/ flutter     PAST MEDICAL & SURGICAL HISTORY:  Diabetes mellitus type 2 in nonobese  BPH (benign prostatic hyperplasia)  Hyperlipidemia  HTN (hypertension)  H/O arthroscopy: R knee      REVIEW OF SYSTEMS:  as per hpi       VITALS:  Vital Signs Last 24 Hrs  T(C): 36.6 (21 Dec 2019 20:38), Max: 36.8 (21 Dec 2019 04:25)  T(F): 97.9 (21 Dec 2019 20:38), Max: 98.2 (21 Dec 2019 04:25)  HR: 85 (21 Dec 2019 20:38) (80 - 103)  BP: 116/68 (21 Dec 2019 20:38) (95/59 - 131/81)  BP(mean): --  RR: 18 (21 Dec 2019 20:38) (16 - 18)  SpO2: 98% (21 Dec 2019 20:38) (96% - 99%)    PHYSICAL EXAM:  GENERAL: NAD, well-developed  HEAD:  +head trauma, +ecchymosis on face/neck  EYES: EOMI, PERRLA, conjunctiva and sclera clear  NECK: Supple, No JVD  CHEST/LUNG: b/l congestion/wheeze   HEART: S1, S2; irreg irreg. No murmurs, rubs, or gallops  ABDOMEN: Soft, Nontender, Nondistended; Bowel sounds present  EXTREMITIES:  2+ Peripheral Pulses, No clubbing, cyanosis, or edema  PSYCH: Normal affect  NEUROLOGY: AAOX3; non-focal  SKIN: No rashes or lesions    Consultant(s) Notes Reviewed:  [x ] YES  [ ] NO  Care Discussed with Consultants/Other Providers [ x] YES  [ ] NO    MEDS:  MEDICATIONS  (STANDING):  acetaminophen  IVPB .. 1000 milliGRAM(s) IV Intermittent once  cholecalciferol 2000 Unit(s) Oral daily  dextrose 5%. 1000 milliLiter(s) (50 mL/Hr) IV Continuous <Continuous>  dextrose 50% Injectable 12.5 Gram(s) IV Push once  dextrose 50% Injectable 25 Gram(s) IV Push once  dextrose 50% Injectable 25 Gram(s) IV Push once  diltiazem    Tablet 60 milliGRAM(s) Oral every 8 hours  enoxaparin Injectable 90 milliGRAM(s) SubCutaneous once  enoxaparin Injectable 90 milliGRAM(s) SubCutaneous every 12 hours  fluticasone propionate 50 MICROgram(s)/spray Nasal Spray 1 Spray(s) Both Nostrils two times a day  hydrochlorothiazide 12.5 milliGRAM(s) Oral daily  insulin glargine Injectable (LANTUS) 62 Unit(s) SubCutaneous at bedtime  insulin lispro (HumaLOG) corrective regimen sliding scale   SubCutaneous three times a day before meals  insulin lispro (HumaLOG) corrective regimen sliding scale   SubCutaneous at bedtime  insulin lispro Injectable (HumaLOG) 14 Unit(s) SubCutaneous three times a day before meals  labetalol 200 milliGRAM(s) Oral two times a day  loratadine 10 milliGRAM(s) Oral daily  losartan 100 milliGRAM(s) Oral daily  multivitamin/minerals 1 Tablet(s) Oral daily  polyethylene glycol 3350 17 Gram(s) Oral daily  potassium chloride   Solution 20 milliEquivalent(s) Oral once  tamsulosin 0.4 milliGRAM(s) Oral at bedtime    MEDICATIONS  (PRN):  artificial tears (preservative free) Ophthalmic Solution 1 Drop(s) Both EYES four times a day PRN Dry Eyes  dextrose 40% Gel 15 Gram(s) Oral once PRN Blood Glucose LESS THAN 70 milliGRAM(s)/deciliter  glucagon  Injectable 1 milliGRAM(s) IntraMuscular once PRN Glucose LESS THAN 70 milligrams/deciliter        ALLERGIES:  No Known Allergies      LABS:                                 14.0   10.29 )-----------( 231      ( 21 Dec 2019 10:15 )             43.0   12-21    142  |  100  |  23  ----------------------------<  210<H>  3.4<L>   |  27  |  1.16    Ca    9.2      21 Dec 2019 06:09  Mg     1.8     12-20            < from: Xray Chest 1 View- PORTABLE-Urgent (12.16.19 @ 12:45) >  IMPRESSION:    Clear lungs.    < end of copied text >         < from: CT Thoracic Spine Reform No Cont (12.15.19 @ 17:20) >    IMPRESSION:   CT BRAIN: No acute intracranial bleeding, mass effect, or shift.   Resolving right frontal scalp hematoma, now small compared to prior CT   head from 11/28/2019.     CT CERVICAL SPINE: No acute fracture subluxation. Multilevel   spondylosis..     CT THORACIC SPINE: No acute fracture subluxation. Multilevel spondylosis.   Increased density along the left posterior lateral aspect of the central   canal at the T6-7 level possibly representing a meningioma. Follow-up   thoracic spine MRI is recommended.  Partially imaged abdomen demonstrates right renal hypodensity and nodular   thickening of the bilateral adrenal glands better evaluated on same day   CT abdomen pelvis.     CT LUMBAR SPINE: No acute fracture subluxation. Multilevel spondylosis.    < end of copied text >

## 2019-12-22 LAB
ANION GAP SERPL CALC-SCNC: 10 MMOL/L — SIGNIFICANT CHANGE UP (ref 5–17)
BUN SERPL-MCNC: 26 MG/DL — HIGH (ref 7–23)
CALCIUM SERPL-MCNC: 9.2 MG/DL — SIGNIFICANT CHANGE UP (ref 8.4–10.5)
CHLORIDE SERPL-SCNC: 105 MMOL/L — SIGNIFICANT CHANGE UP (ref 96–108)
CO2 SERPL-SCNC: 29 MMOL/L — SIGNIFICANT CHANGE UP (ref 22–31)
CREAT SERPL-MCNC: 1.19 MG/DL — SIGNIFICANT CHANGE UP (ref 0.5–1.3)
GLUCOSE BLDC GLUCOMTR-MCNC: 117 MG/DL — HIGH (ref 70–99)
GLUCOSE BLDC GLUCOMTR-MCNC: 140 MG/DL — HIGH (ref 70–99)
GLUCOSE BLDC GLUCOMTR-MCNC: 162 MG/DL — HIGH (ref 70–99)
GLUCOSE BLDC GLUCOMTR-MCNC: 224 MG/DL — HIGH (ref 70–99)
GLUCOSE BLDC GLUCOMTR-MCNC: 61 MG/DL — LOW (ref 70–99)
GLUCOSE BLDC GLUCOMTR-MCNC: 62 MG/DL — LOW (ref 70–99)
GLUCOSE SERPL-MCNC: 95 MG/DL — SIGNIFICANT CHANGE UP (ref 70–99)
HCT VFR BLD CALC: 39.3 % — SIGNIFICANT CHANGE UP (ref 39–50)
HGB BLD-MCNC: 12.8 G/DL — LOW (ref 13–17)
MAGNESIUM SERPL-MCNC: 2 MG/DL — SIGNIFICANT CHANGE UP (ref 1.6–2.6)
MCHC RBC-ENTMCNC: 30.5 PG — SIGNIFICANT CHANGE UP (ref 27–34)
MCHC RBC-ENTMCNC: 32.6 GM/DL — SIGNIFICANT CHANGE UP (ref 32–36)
MCV RBC AUTO: 93.6 FL — SIGNIFICANT CHANGE UP (ref 80–100)
PLATELET # BLD AUTO: 212 K/UL — SIGNIFICANT CHANGE UP (ref 150–400)
POTASSIUM SERPL-MCNC: 3.5 MMOL/L — SIGNIFICANT CHANGE UP (ref 3.5–5.3)
POTASSIUM SERPL-SCNC: 3.5 MMOL/L — SIGNIFICANT CHANGE UP (ref 3.5–5.3)
RBC # BLD: 4.2 M/UL — SIGNIFICANT CHANGE UP (ref 4.2–5.8)
RBC # FLD: 13.2 % — SIGNIFICANT CHANGE UP (ref 10.3–14.5)
SODIUM SERPL-SCNC: 144 MMOL/L — SIGNIFICANT CHANGE UP (ref 135–145)
WBC # BLD: 10.93 K/UL — HIGH (ref 3.8–10.5)
WBC # FLD AUTO: 10.93 K/UL — HIGH (ref 3.8–10.5)

## 2019-12-22 RX ORDER — IPRATROPIUM BROMIDE 0.2 MG/ML
500 SOLUTION, NON-ORAL INHALATION ONCE
Refills: 0 | Status: DISCONTINUED | OUTPATIENT
Start: 2019-12-22 | End: 2019-12-24

## 2019-12-22 RX ORDER — LACTULOSE 10 G/15ML
15 SOLUTION ORAL ONCE
Refills: 0 | Status: COMPLETED | OUTPATIENT
Start: 2019-12-22 | End: 2019-12-22

## 2019-12-22 RX ORDER — DEXTROSE 50 % IN WATER 50 %
12.5 SYRINGE (ML) INTRAVENOUS ONCE
Refills: 0 | Status: COMPLETED | OUTPATIENT
Start: 2019-12-22 | End: 2019-12-22

## 2019-12-22 RX ORDER — SENNA PLUS 8.6 MG/1
2 TABLET ORAL AT BEDTIME
Refills: 0 | Status: DISCONTINUED | OUTPATIENT
Start: 2019-12-22 | End: 2019-12-24

## 2019-12-22 RX ORDER — INSULIN GLARGINE 100 [IU]/ML
30 INJECTION, SOLUTION SUBCUTANEOUS ONCE
Refills: 0 | Status: COMPLETED | OUTPATIENT
Start: 2019-12-22 | End: 2019-12-22

## 2019-12-22 RX ORDER — INSULIN GLARGINE 100 [IU]/ML
30 INJECTION, SOLUTION SUBCUTANEOUS
Refills: 0 | Status: DISCONTINUED | OUTPATIENT
Start: 2019-12-22 | End: 2019-12-22

## 2019-12-22 RX ADMIN — Medication 12.5 GRAM(S): at 21:54

## 2019-12-22 RX ADMIN — LORATADINE 10 MILLIGRAM(S): 10 TABLET ORAL at 12:42

## 2019-12-22 RX ADMIN — ENOXAPARIN SODIUM 90 MILLIGRAM(S): 100 INJECTION SUBCUTANEOUS at 05:40

## 2019-12-22 RX ADMIN — LACTULOSE 15 GRAM(S): 10 SOLUTION ORAL at 14:32

## 2019-12-22 RX ADMIN — Medication 14 UNIT(S): at 16:50

## 2019-12-22 RX ADMIN — Medication 60 MILLIGRAM(S): at 14:32

## 2019-12-22 RX ADMIN — SENNA PLUS 2 TABLET(S): 8.6 TABLET ORAL at 22:48

## 2019-12-22 RX ADMIN — Medication 14 UNIT(S): at 12:10

## 2019-12-22 RX ADMIN — Medication 1 SPRAY(S): at 16:52

## 2019-12-22 RX ADMIN — TAMSULOSIN HYDROCHLORIDE 0.4 MILLIGRAM(S): 0.4 CAPSULE ORAL at 22:48

## 2019-12-22 RX ADMIN — Medication 1 SPRAY(S): at 05:41

## 2019-12-22 RX ADMIN — Medication 1 TABLET(S): at 12:42

## 2019-12-22 RX ADMIN — Medication 60 MILLIGRAM(S): at 05:40

## 2019-12-22 RX ADMIN — LOSARTAN POTASSIUM 100 MILLIGRAM(S): 100 TABLET, FILM COATED ORAL at 05:41

## 2019-12-22 RX ADMIN — POLYETHYLENE GLYCOL 3350 17 GRAM(S): 17 POWDER, FOR SOLUTION ORAL at 12:42

## 2019-12-22 RX ADMIN — ENOXAPARIN SODIUM 90 MILLIGRAM(S): 100 INJECTION SUBCUTANEOUS at 17:04

## 2019-12-22 RX ADMIN — Medication 60 MILLIGRAM(S): at 23:46

## 2019-12-22 RX ADMIN — Medication 200 MILLIGRAM(S): at 05:41

## 2019-12-22 RX ADMIN — INSULIN GLARGINE 30 UNIT(S): 100 INJECTION, SOLUTION SUBCUTANEOUS at 23:30

## 2019-12-22 RX ADMIN — Medication 4: at 12:10

## 2019-12-22 RX ADMIN — Medication 2: at 16:49

## 2019-12-22 RX ADMIN — Medication 12.5 MILLIGRAM(S): at 05:40

## 2019-12-22 RX ADMIN — Medication 2000 UNIT(S): at 12:41

## 2019-12-22 NOTE — PROGRESS NOTE ADULT - SUBJECTIVE AND OBJECTIVE BOX
Chief complaint  Patient is a 84y old  Male who presents with a chief complaint of s/p fall (21 Dec 2019 21:00)   Review of systems  Patient in bed, looks comfortable, no fever, no hypoglycemia.    Labs and Fingersticks  CAPILLARY BLOOD GLUCOSE      POCT Blood Glucose.: 117 mg/dL (22 Dec 2019 07:45)  POCT Blood Glucose.: 122 mg/dL (21 Dec 2019 21:33)  POCT Blood Glucose.: 216 mg/dL (21 Dec 2019 16:51)  POCT Blood Glucose.: 201 mg/dL (21 Dec 2019 11:48)      Anion Gap, Serum: 10 (12-22 @ 06:52)  Anion Gap, Serum: 15 (12-21 @ 06:09)      Calcium, Total Serum: 9.2 (12-22 @ 06:52)  Calcium, Total Serum: 9.2 (12-21 @ 06:09)          12-22    144  |  105  |  26<H>  ----------------------------<  95  3.5   |  29  |  1.19    Ca    9.2      22 Dec 2019 06:52  Mg     2.0     12-22                          12.8   10.93 )-----------( 212      ( 22 Dec 2019 09:00 )             39.3     Medications  MEDICATIONS  (STANDING):  acetaminophen  IVPB .. 1000 milliGRAM(s) IV Intermittent once  cholecalciferol 2000 Unit(s) Oral daily  dextrose 5%. 1000 milliLiter(s) (50 mL/Hr) IV Continuous <Continuous>  dextrose 50% Injectable 12.5 Gram(s) IV Push once  dextrose 50% Injectable 25 Gram(s) IV Push once  dextrose 50% Injectable 25 Gram(s) IV Push once  diltiazem    Tablet 60 milliGRAM(s) Oral every 8 hours  enoxaparin Injectable 90 milliGRAM(s) SubCutaneous once  enoxaparin Injectable 90 milliGRAM(s) SubCutaneous every 12 hours  fluticasone propionate 50 MICROgram(s)/spray Nasal Spray 1 Spray(s) Both Nostrils two times a day  hydrochlorothiazide 12.5 milliGRAM(s) Oral daily  insulin glargine Injectable (LANTUS) 62 Unit(s) SubCutaneous at bedtime  insulin lispro (HumaLOG) corrective regimen sliding scale   SubCutaneous three times a day before meals  insulin lispro (HumaLOG) corrective regimen sliding scale   SubCutaneous at bedtime  insulin lispro Injectable (HumaLOG) 14 Unit(s) SubCutaneous three times a day before meals  labetalol 200 milliGRAM(s) Oral two times a day  loratadine 10 milliGRAM(s) Oral daily  losartan 100 milliGRAM(s) Oral daily  multivitamin/minerals 1 Tablet(s) Oral daily  polyethylene glycol 3350 17 Gram(s) Oral daily  potassium chloride   Solution 20 milliEquivalent(s) Oral once  tamsulosin 0.4 milliGRAM(s) Oral at bedtime      Physical Exam  General: Patient comfortable in bed  Vital Signs Last 12 Hrs  T(F): 97.5 (12-22-19 @ 04:24), Max: 97.5 (12-22-19 @ 04:24)  HR: 87 (12-22-19 @ 04:24) (87 - 87)  BP: 119/73 (12-22-19 @ 04:24) (119/73 - 119/73)  BP(mean): --  RR: 18 (12-22-19 @ 04:24) (18 - 18)  SpO2: 96% (12-22-19 @ 04:24) (96% - 96%)  Neck: No palpable thyroid nodules.  CVS: S1S2, No murmurs  Respiratory: No wheezing, no crepitations  GI: Abdomen soft, bowel sounds positive  Musculoskeletal:  edema lower extremities.   Skin: No skin rashes, no ecchymosis    Diagnostics

## 2019-12-22 NOTE — PROGRESS NOTE ADULT - ASSESSMENT
Assessment  DMT2: 84y Male with DM T2 with hyperglycemia, A1C 7.9%, was on oral meds and insulin at home, started on basal bolus insulin, dose was adjusted, no new hypoglycemic episode, blood sugars trending down.  URI: on management, stable, monitored.  Frequent Falls: On fall precautions, PT eval.  HTN: Controlled,  on antihypertensive medications.  HLD: Controlled, on statin.          Gentry Cole MD  Cell: [phone]  Office: [phone]

## 2019-12-22 NOTE — SWALLOW BEDSIDE ASSESSMENT ADULT - ORAL PHASE
Within functional limits Delayed oral transit time Patient with one or more new problems requiring additional work-up/treatment.

## 2019-12-22 NOTE — PROGRESS NOTE ADULT - ASSESSMENT
85 yo male PMhx DM on insulin, HTN, BPH, HLD p/w generalized weakness and inability to ambulate after a fall, congestion with GRANT     congestion/wheezing  likely URI  abx stopped by pulshira yang  pulm follow up  improved     fevers  id consulted  check blood and urine cult - ngtd  abx now stopped   resolved     afib with rvr  AC with lovenox bid  rate control with cardizem  f/u echo  cont tele monitor  cards f/u  plan for dccv tomorrow     dysphagia diet as per swallow eval

## 2019-12-22 NOTE — PROGRESS NOTE ADULT - SUBJECTIVE AND OBJECTIVE BOX
HOLLIE STEVENS  84y Male  MRN:[id number]    Patient is a 84y old  Male who presents with a chief complaint of s/p fall (15 Dec 2019 22:05)    HPI:  83 yo male PMhx DM on insulin, HTN, BPH, HLD presents to the ED with unwitnessed fall the day prior with inability to get up and ambulate. Pt reports he slipped off the bed the day prior onto floor and was unable to get up.  Wife attempted to help him up but was unable, so placed pillow under head and pt slept on floor overnight. This AM pt was still unable to get up so EMS was called. Patient states he uses a cane to ambulate outside the house, but ambulate independently.  Patient had a trip and fall outside the house in Thanksgiving, resulting in bruising and hematoma R chin, abd, and R frontal scalp, evaluated here in ED, and discharged home.  Patient has been feeling congested for the past couple days, decrease PO intake, and GRANT.  Patient denies fever (but on Ibuprofen 800 3x daily for back and leg pain), chest pain, cough, LOC, dysuria or abd pain.  No sick contact.  c/o gen weakness and inability to ambulate (15 Dec 2019 22:05)      Patient seen and evaluated at bedside. No acute events overnight except as noted.    Interval HPI: no events o/n.    Tele: afib/ flutter     PAST MEDICAL & SURGICAL HISTORY:  Diabetes mellitus type 2 in nonobese  BPH (benign prostatic hyperplasia)  Hyperlipidemia  HTN (hypertension)  H/O arthroscopy: R knee      REVIEW OF SYSTEMS:  as per hpi       VITALS:  Vital Signs Last 24 Hrs  T(C): 36.4 (22 Dec 2019 12:00), Max: 36.6 (21 Dec 2019 17:21)  T(F): 97.6 (22 Dec 2019 12:00), Max: 97.9 (21 Dec 2019 20:38)  HR: 87 (22 Dec 2019 12:00) (85 - 88)  BP: 94/60 (22 Dec 2019 12:00) (94/60 - 122/80)  BP(mean): --  RR: 18 (22 Dec 2019 12:00) (16 - 18)  SpO2: 99% (22 Dec 2019 12:00) (96% - 99%)    PHYSICAL EXAM:  GENERAL: NAD, well-developed  HEAD:  +head trauma, +ecchymosis on face/neck  EYES: EOMI, PERRLA, conjunctiva and sclera clear  NECK: Supple, No JVD  CHEST/LUNG: b/l congestion/wheeze   HEART: S1, S2; irreg irreg. No murmurs, rubs, or gallops  ABDOMEN: Soft, Nontender, Nondistended; Bowel sounds present  EXTREMITIES:  2+ Peripheral Pulses, No clubbing, cyanosis, or edema  PSYCH: Normal affect  NEUROLOGY: AAOX3; non-focal  SKIN: No rashes or lesions    Consultant(s) Notes Reviewed:  [x ] YES  [ ] NO  Care Discussed with Consultants/Other Providers [ x] YES  [ ] NO    MEDS:  MEDICATIONS  (STANDING):  acetaminophen  IVPB .. 1000 milliGRAM(s) IV Intermittent once  cholecalciferol 2000 Unit(s) Oral daily  dextrose 5%. 1000 milliLiter(s) (50 mL/Hr) IV Continuous <Continuous>  dextrose 50% Injectable 12.5 Gram(s) IV Push once  dextrose 50% Injectable 25 Gram(s) IV Push once  dextrose 50% Injectable 25 Gram(s) IV Push once  diltiazem    Tablet 60 milliGRAM(s) Oral every 8 hours  enoxaparin Injectable 90 milliGRAM(s) SubCutaneous once  enoxaparin Injectable 90 milliGRAM(s) SubCutaneous every 12 hours  fluticasone propionate 50 MICROgram(s)/spray Nasal Spray 1 Spray(s) Both Nostrils two times a day  hydrochlorothiazide 12.5 milliGRAM(s) Oral daily  insulin glargine Injectable (LANTUS) 62 Unit(s) SubCutaneous at bedtime  insulin lispro (HumaLOG) corrective regimen sliding scale   SubCutaneous three times a day before meals  insulin lispro (HumaLOG) corrective regimen sliding scale   SubCutaneous at bedtime  insulin lispro Injectable (HumaLOG) 14 Unit(s) SubCutaneous three times a day before meals  ipratropium  for Nebulization. 500 MICROGram(s) Nebulizer once  labetalol 200 milliGRAM(s) Oral two times a day  loratadine 10 milliGRAM(s) Oral daily  losartan 100 milliGRAM(s) Oral daily  multivitamin/minerals 1 Tablet(s) Oral daily  polyethylene glycol 3350 17 Gram(s) Oral daily  potassium chloride   Solution 20 milliEquivalent(s) Oral once  senna 2 Tablet(s) Oral at bedtime  tamsulosin 0.4 milliGRAM(s) Oral at bedtime    MEDICATIONS  (PRN):  artificial tears (preservative free) Ophthalmic Solution 1 Drop(s) Both EYES four times a day PRN Dry Eyes  dextrose 40% Gel 15 Gram(s) Oral once PRN Blood Glucose LESS THAN 70 milliGRAM(s)/deciliter  glucagon  Injectable 1 milliGRAM(s) IntraMuscular once PRN Glucose LESS THAN 70 milligrams/deciliter        ALLERGIES:  No Known Allergies      LABS:                                    12.8   10.93 )-----------( 212      ( 22 Dec 2019 09:00 )             39.3   12-22    144  |  105  |  26<H>  ----------------------------<  95  3.5   |  29  |  1.19    Ca    9.2      22 Dec 2019 06:52  Mg     2.0     12-22                < from: Xray Chest 1 View- PORTABLE-Urgent (12.16.19 @ 12:45) >  IMPRESSION:    Clear lungs.    < end of copied text >         < from: CT Thoracic Spine Reform No Cont (12.15.19 @ 17:20) >    IMPRESSION:   CT BRAIN: No acute intracranial bleeding, mass effect, or shift.   Resolving right frontal scalp hematoma, now small compared to prior CT   head from 11/28/2019.     CT CERVICAL SPINE: No acute fracture subluxation. Multilevel   spondylosis..     CT THORACIC SPINE: No acute fracture subluxation. Multilevel spondylosis.   Increased density along the left posterior lateral aspect of the central   canal at the T6-7 level possibly representing a meningioma. Follow-up   thoracic spine MRI is recommended.  Partially imaged abdomen demonstrates right renal hypodensity and nodular   thickening of the bilateral adrenal glands better evaluated on same day   CT abdomen pelvis.     CT LUMBAR SPINE: No acute fracture subluxation. Multilevel spondylosis.    < end of copied text >

## 2019-12-23 LAB
ANION GAP SERPL CALC-SCNC: 10 MMOL/L — SIGNIFICANT CHANGE UP (ref 5–17)
BUN SERPL-MCNC: 26 MG/DL — HIGH (ref 7–23)
CALCIUM SERPL-MCNC: 9.5 MG/DL — SIGNIFICANT CHANGE UP (ref 8.4–10.5)
CHLORIDE SERPL-SCNC: 103 MMOL/L — SIGNIFICANT CHANGE UP (ref 96–108)
CO2 SERPL-SCNC: 30 MMOL/L — SIGNIFICANT CHANGE UP (ref 22–31)
CREAT SERPL-MCNC: 1.32 MG/DL — HIGH (ref 0.5–1.3)
CULTURE RESULTS: SIGNIFICANT CHANGE UP
CULTURE RESULTS: SIGNIFICANT CHANGE UP
GLUCOSE BLDC GLUCOMTR-MCNC: 152 MG/DL — HIGH (ref 70–99)
GLUCOSE BLDC GLUCOMTR-MCNC: 170 MG/DL — HIGH (ref 70–99)
GLUCOSE BLDC GLUCOMTR-MCNC: 190 MG/DL — HIGH (ref 70–99)
GLUCOSE BLDC GLUCOMTR-MCNC: 77 MG/DL — SIGNIFICANT CHANGE UP (ref 70–99)
GLUCOSE BLDC GLUCOMTR-MCNC: 98 MG/DL — SIGNIFICANT CHANGE UP (ref 70–99)
GLUCOSE SERPL-MCNC: 79 MG/DL — SIGNIFICANT CHANGE UP (ref 70–99)
HCT VFR BLD CALC: 39.6 % — SIGNIFICANT CHANGE UP (ref 39–50)
HGB BLD-MCNC: 12.8 G/DL — LOW (ref 13–17)
MCHC RBC-ENTMCNC: 30.3 PG — SIGNIFICANT CHANGE UP (ref 27–34)
MCHC RBC-ENTMCNC: 32.3 GM/DL — SIGNIFICANT CHANGE UP (ref 32–36)
MCV RBC AUTO: 93.6 FL — SIGNIFICANT CHANGE UP (ref 80–100)
PLATELET # BLD AUTO: 221 K/UL — SIGNIFICANT CHANGE UP (ref 150–400)
POTASSIUM SERPL-MCNC: 3.6 MMOL/L — SIGNIFICANT CHANGE UP (ref 3.5–5.3)
POTASSIUM SERPL-SCNC: 3.6 MMOL/L — SIGNIFICANT CHANGE UP (ref 3.5–5.3)
RBC # BLD: 4.23 M/UL — SIGNIFICANT CHANGE UP (ref 4.2–5.8)
RBC # FLD: 13.2 % — SIGNIFICANT CHANGE UP (ref 10.3–14.5)
SODIUM SERPL-SCNC: 143 MMOL/L — SIGNIFICANT CHANGE UP (ref 135–145)
SPECIMEN SOURCE: SIGNIFICANT CHANGE UP
SPECIMEN SOURCE: SIGNIFICANT CHANGE UP
WBC # BLD: 10.7 K/UL — HIGH (ref 3.8–10.5)
WBC # FLD AUTO: 10.7 K/UL — HIGH (ref 3.8–10.5)

## 2019-12-23 PROCEDURE — 99233 SBSQ HOSP IP/OBS HIGH 50: CPT

## 2019-12-23 PROCEDURE — 93010 ELECTROCARDIOGRAM REPORT: CPT

## 2019-12-23 RX ORDER — INSULIN GLARGINE 100 [IU]/ML
40 INJECTION, SOLUTION SUBCUTANEOUS AT BEDTIME
Refills: 0 | Status: DISCONTINUED | OUTPATIENT
Start: 2019-12-23 | End: 2019-12-24

## 2019-12-23 RX ORDER — SODIUM CHLORIDE 9 MG/ML
1000 INJECTION, SOLUTION INTRAVENOUS
Refills: 0 | Status: DISCONTINUED | OUTPATIENT
Start: 2019-12-23 | End: 2019-12-24

## 2019-12-23 RX ORDER — INSULIN LISPRO 100/ML
8 VIAL (ML) SUBCUTANEOUS
Refills: 0 | Status: DISCONTINUED | OUTPATIENT
Start: 2019-12-23 | End: 2019-12-24

## 2019-12-23 RX ORDER — INSULIN LISPRO 100/ML
10 VIAL (ML) SUBCUTANEOUS
Refills: 0 | Status: DISCONTINUED | OUTPATIENT
Start: 2019-12-23 | End: 2019-12-23

## 2019-12-23 RX ORDER — INSULIN GLARGINE 100 [IU]/ML
55 INJECTION, SOLUTION SUBCUTANEOUS AT BEDTIME
Refills: 0 | Status: DISCONTINUED | OUTPATIENT
Start: 2019-12-23 | End: 2019-12-23

## 2019-12-23 RX ADMIN — Medication 2000 UNIT(S): at 11:57

## 2019-12-23 RX ADMIN — Medication 2: at 12:38

## 2019-12-23 RX ADMIN — Medication 1 SPRAY(S): at 17:18

## 2019-12-23 RX ADMIN — LOSARTAN POTASSIUM 100 MILLIGRAM(S): 100 TABLET, FILM COATED ORAL at 06:37

## 2019-12-23 RX ADMIN — Medication 60 MILLIGRAM(S): at 06:37

## 2019-12-23 RX ADMIN — ENOXAPARIN SODIUM 90 MILLIGRAM(S): 100 INJECTION SUBCUTANEOUS at 17:17

## 2019-12-23 RX ADMIN — TAMSULOSIN HYDROCHLORIDE 0.4 MILLIGRAM(S): 0.4 CAPSULE ORAL at 22:10

## 2019-12-23 RX ADMIN — Medication 200 MILLIGRAM(S): at 09:08

## 2019-12-23 RX ADMIN — INSULIN GLARGINE 40 UNIT(S): 100 INJECTION, SOLUTION SUBCUTANEOUS at 22:10

## 2019-12-23 RX ADMIN — Medication 12.5 MILLIGRAM(S): at 06:37

## 2019-12-23 RX ADMIN — Medication 200 MILLIGRAM(S): at 17:18

## 2019-12-23 RX ADMIN — Medication 8 UNIT(S): at 17:09

## 2019-12-23 RX ADMIN — SODIUM CHLORIDE 75 MILLILITER(S): 9 INJECTION, SOLUTION INTRAVENOUS at 06:25

## 2019-12-23 RX ADMIN — Medication 1 TABLET(S): at 11:57

## 2019-12-23 RX ADMIN — Medication 2: at 17:09

## 2019-12-23 RX ADMIN — Medication 1 SPRAY(S): at 06:37

## 2019-12-23 RX ADMIN — LORATADINE 10 MILLIGRAM(S): 10 TABLET ORAL at 11:57

## 2019-12-23 RX ADMIN — Medication 60 MILLIGRAM(S): at 22:10

## 2019-12-23 RX ADMIN — ENOXAPARIN SODIUM 90 MILLIGRAM(S): 100 INJECTION SUBCUTANEOUS at 11:58

## 2019-12-23 NOTE — CONSULT NOTE ADULT - ASSESSMENT
85 y/o male with PMH of DM on Insulin, HTN, HLD, BPH, p/w unwitnessed fall the day prior to admission with inability to get up and ambulate. Patient reports he slipped off the bed onto the floor, unable to get up and slept on the floor. Patient had a mechanical fall last Thanksgiving when he tripped and fell forward and developed hematoma. Head CT was negative. He also c/o of congestion with GRANT/URI, he was given steroids and nebulizers. On 12/18/19 an RRT was called on him 2/2 tachycardia in the 170's, found to be in new onset atrial fibrillation, placed on Lovenox 90mg SQ bid. EPS consulted for KATHLEEN CV today.    # New Onset Atrial Fibrillation  # URI likely Viral  - Continue telemetry monitoring  - On full dose Lovenox 90mg SQ bid for AC  - Cardizem 60mg q8h for rate control  - Discussed with family regarding KATHLEEN CV, consent obtained from lexis Rivera Eng    #[id number] 83 y/o male with PMH of DM on Insulin, HTN, HLD, BPH, p/w unwitnessed fall the day prior to admission with inability to get up and ambulate. Patient reports he slipped off the bed onto the floor, unable to get up and slept on the floor. Patient had a mechanical fall last Thanksgiving when he tripped and fell forward and developed hematoma. Head CT was negative. He also c/o of congestion with GRANT/URI, he was given steroids and nebulizers. On 12/18/19 an RRT was called on him 2/2 tachycardia in the 170's, found to be in new onset atrial fibrillation, placed on Lovenox 90mg SQ bid. EPS consulted for KATHLEEN CV today.    # New Onset Atrial Fibrillation  # URI likely Viral  - Continue telemetry monitoring  - On full dose Lovenox 90mg SQ bid for AC  - Cardizem 60mg q8h for rate control  - Discussed with family regarding KATHLEEN CV, consent obtained from son Miguel Syed  - Plan for Biotel patch upon discharge for continued monitoring      #[phone] 83 y/o male with PMH of DM on Insulin, HTN, HLD, BPH, p/w unwitnessed fall the day prior to admission with inability to get up and ambulate. Patient reports he slipped off the bed onto the floor, unable to get up and slept on the floor. Patient had a mechanical fall last Thanksgiving when he tripped and fell forward and developed hematoma. Head CT was negative. He also c/o of congestion with GRANT/URI, he was given steroids and nebulizers. On 12/18/19 an RRT was called on him 2/2 tachycardia in the 170's, found to be in new onset atrial fibrillation, placed on Lovenox 90mg SQ bid. EPS consulted for KATHLEEN CV today.    # New Onset Atrial Fibrillation  # URI likely Viral  - Continue telemetry monitoring  - On full dose Lovenox 90mg SQ bid for AC with plan to change to NOAC post cardioversion  - Cardizem 60mg q8h for rate control  - Discussed with family regarding KATHLEEN CV, consent obtained from son Miguel Syed  - Plan for Biotel patch upon discharge for continued monitoring  - Above d/w EP attdg and Medicine NP    #[phone]

## 2019-12-23 NOTE — CONSULT NOTE ADULT - ATTENDING COMMENTS
seen and examined with NP. I agree with H & P, A & P.  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  His rapid ventricualr rate in AF supports adequate conduction.  Have to consider conversion pauses or intermittent AV block as etiology of "falls".  Would favor Biotel on DC.
Will increase Humalog to 10u before each meal.  Will continue Lantus 56u at bedtime as well as Humalog correction scale coverage. Will continue monitoring FS, log, and FU.

## 2019-12-23 NOTE — PROGRESS NOTE ADULT - SUBJECTIVE AND OBJECTIVE BOX
Chief complaint  Patient is a 84y old  Male who presents with a chief complaint of s/p fall (23 Dec 2019 15:42)   Review of systems  Patient in bed, looks comfortable, no fever,  had hypoglycemic episode yesterday evening (FS 61).    Labs and Fingersticks  CAPILLARY BLOOD GLUCOSE      POCT Blood Glucose.: 170 mg/dL (23 Dec 2019 12:03)  POCT Blood Glucose.: 98 mg/dL (23 Dec 2019 08:05)  POCT Blood Glucose.: 77 mg/dL (23 Dec 2019 06:16)  POCT Blood Glucose.: 140 mg/dL (22 Dec 2019 22:06)  POCT Blood Glucose.: 62 mg/dL (22 Dec 2019 21:45)  POCT Blood Glucose.: 61 mg/dL (22 Dec 2019 21:43)  POCT Blood Glucose.: 162 mg/dL (22 Dec 2019 16:37)      Anion Gap, Serum: 10 (12-23 @ 06:14)  Anion Gap, Serum: 10 (12-22 @ 06:52)      Calcium, Total Serum: 9.5 (12-23 @ 06:14)  Calcium, Total Serum: 9.2 (12-22 @ 06:52)          12-23    143  |  103  |  26<H>  ----------------------------<  79  3.6   |  30  |  1.32<H>    Ca    9.5      23 Dec 2019 06:14  Mg     2.0     12-22                          12.8   10.70 )-----------( 221      ( 23 Dec 2019 07:45 )             39.6     Medications  MEDICATIONS  (STANDING):  acetaminophen  IVPB .. 1000 milliGRAM(s) IV Intermittent once  cholecalciferol 2000 Unit(s) Oral daily  dextrose 5% + sodium chloride 0.45%. 1000 milliLiter(s) (75 mL/Hr) IV Continuous <Continuous>  dextrose 5%. 1000 milliLiter(s) (50 mL/Hr) IV Continuous <Continuous>  dextrose 50% Injectable 12.5 Gram(s) IV Push once  dextrose 50% Injectable 25 Gram(s) IV Push once  dextrose 50% Injectable 25 Gram(s) IV Push once  diltiazem    Tablet 60 milliGRAM(s) Oral every 8 hours  enoxaparin Injectable 90 milliGRAM(s) SubCutaneous once  enoxaparin Injectable 90 milliGRAM(s) SubCutaneous every 12 hours  fluticasone propionate 50 MICROgram(s)/spray Nasal Spray 1 Spray(s) Both Nostrils two times a day  hydrochlorothiazide 12.5 milliGRAM(s) Oral daily  insulin glargine Injectable (LANTUS) 40 Unit(s) SubCutaneous at bedtime  insulin lispro (HumaLOG) corrective regimen sliding scale   SubCutaneous three times a day before meals  insulin lispro (HumaLOG) corrective regimen sliding scale   SubCutaneous at bedtime  insulin lispro Injectable (HumaLOG) 8 Unit(s) SubCutaneous three times a day before meals  ipratropium  for Nebulization. 500 MICROGram(s) Nebulizer once  labetalol 200 milliGRAM(s) Oral two times a day  loratadine 10 milliGRAM(s) Oral daily  losartan 100 milliGRAM(s) Oral daily  multivitamin/minerals 1 Tablet(s) Oral daily  polyethylene glycol 3350 17 Gram(s) Oral daily  potassium chloride   Solution 20 milliEquivalent(s) Oral once  senna 2 Tablet(s) Oral at bedtime  tamsulosin 0.4 milliGRAM(s) Oral at bedtime      Physical Exam  General: Patient comfortable in bed  Vital Signs Last 12 Hrs  T(F): 97.2 (12-23-19 @ 11:57), Max: 97.5 (12-23-19 @ 04:45)  HR: 84 (12-23-19 @ 14:15) (73 - 106)  BP: 96/56 (12-23-19 @ 14:15) (92/54 - 115/75)  BP(mean): --  RR: 18 (12-23-19 @ 13:23) (18 - 18)  SpO2: 96% (12-23-19 @ 13:23) (94% - 98%)  Neck: No palpable thyroid nodules.  CVS: S1S2, No murmurs  Respiratory: No wheezing, no crepitations  GI: Abdomen soft, bowel sounds positive  Musculoskeletal:  edema lower extremities.   Skin: No skin rashes, no ecchymosis    Diagnostics Chief complaint  Patient is a 84y old  Male who presents with a chief complaint of s/p fall (23 Dec 2019 15:42)   Review of systems  Patient in bed, looks comfortable, no fever,   had hypoglycemic episode yesterday evening (FS 61).    Labs and Fingersticks  CAPILLARY BLOOD GLUCOSE      POCT Blood Glucose.: 170 mg/dL (23 Dec 2019 12:03)  POCT Blood Glucose.: 98 mg/dL (23 Dec 2019 08:05)  POCT Blood Glucose.: 77 mg/dL (23 Dec 2019 06:16)  POCT Blood Glucose.: 140 mg/dL (22 Dec 2019 22:06)  POCT Blood Glucose.: 62 mg/dL (22 Dec 2019 21:45)  POCT Blood Glucose.: 61 mg/dL (22 Dec 2019 21:43)  POCT Blood Glucose.: 162 mg/dL (22 Dec 2019 16:37)      Anion Gap, Serum: 10 (12-23 @ 06:14)  Anion Gap, Serum: 10 (12-22 @ 06:52)      Calcium, Total Serum: 9.5 (12-23 @ 06:14)  Calcium, Total Serum: 9.2 (12-22 @ 06:52)          12-23    143  |  103  |  26<H>  ----------------------------<  79  3.6   |  30  |  1.32<H>    Ca    9.5      23 Dec 2019 06:14  Mg     2.0     12-22                          12.8   10.70 )-----------( 221      ( 23 Dec 2019 07:45 )             39.6     Medications  MEDICATIONS  (STANDING):  acetaminophen  IVPB .. 1000 milliGRAM(s) IV Intermittent once  cholecalciferol 2000 Unit(s) Oral daily  dextrose 5% + sodium chloride 0.45%. 1000 milliLiter(s) (75 mL/Hr) IV Continuous <Continuous>  dextrose 5%. 1000 milliLiter(s) (50 mL/Hr) IV Continuous <Continuous>  dextrose 50% Injectable 12.5 Gram(s) IV Push once  dextrose 50% Injectable 25 Gram(s) IV Push once  dextrose 50% Injectable 25 Gram(s) IV Push once  diltiazem    Tablet 60 milliGRAM(s) Oral every 8 hours  enoxaparin Injectable 90 milliGRAM(s) SubCutaneous once  enoxaparin Injectable 90 milliGRAM(s) SubCutaneous every 12 hours  fluticasone propionate 50 MICROgram(s)/spray Nasal Spray 1 Spray(s) Both Nostrils two times a day  hydrochlorothiazide 12.5 milliGRAM(s) Oral daily  insulin glargine Injectable (LANTUS) 40 Unit(s) SubCutaneous at bedtime  insulin lispro (HumaLOG) corrective regimen sliding scale   SubCutaneous three times a day before meals  insulin lispro (HumaLOG) corrective regimen sliding scale   SubCutaneous at bedtime  insulin lispro Injectable (HumaLOG) 8 Unit(s) SubCutaneous three times a day before meals  ipratropium  for Nebulization. 500 MICROGram(s) Nebulizer once  labetalol 200 milliGRAM(s) Oral two times a day  loratadine 10 milliGRAM(s) Oral daily  losartan 100 milliGRAM(s) Oral daily  multivitamin/minerals 1 Tablet(s) Oral daily  polyethylene glycol 3350 17 Gram(s) Oral daily  potassium chloride   Solution 20 milliEquivalent(s) Oral once  senna 2 Tablet(s) Oral at bedtime  tamsulosin 0.4 milliGRAM(s) Oral at bedtime      Physical Exam  General: Patient comfortable in bed  Vital Signs Last 12 Hrs  T(F): 97.2 (12-23-19 @ 11:57), Max: 97.5 (12-23-19 @ 04:45)  HR: 84 (12-23-19 @ 14:15) (73 - 106)  BP: 96/56 (12-23-19 @ 14:15) (92/54 - 115/75)  BP(mean): --  RR: 18 (12-23-19 @ 13:23) (18 - 18)  SpO2: 96% (12-23-19 @ 13:23) (94% - 98%)  Neck: No palpable thyroid nodules.  CVS: S1S2, No murmurs  Respiratory: No wheezing, no crepitations  GI: Abdomen soft, bowel sounds positive  Musculoskeletal:  edema lower extremities.   Skin: No skin rashes, no ecchymosis    Diagnostics

## 2019-12-23 NOTE — PROGRESS NOTE ADULT - ASSESSMENT
Assessment  DMT2: 84y Male with DM T2 with hyperglycemia, A1C 7.9%, was on oral meds and insulin at home, now on basal bolus insulin, patient had hypoglycemic episode yesterday evening (FS 61), PM Lantus was held, blood sugars trending within acceptable range today without new hypoglycemic episode. Patient is NPO for KATHLEEN, appears comfortable, family seen by the bedside.  URI: on management, stable, monitored.  Frequent Falls: On fall precautions, PT eval.  HTN: Controlled,  on antihypertensive medications.  HLD: Controlled, on statin.          Gentry Cole MD  Cell: [phone]  Office: [phone] Assessment  DMT2: 84y Male with DM T2 with hyperglycemia, A1C 7.9%, was on oral meds and insulin at home, now on basal bolus insulin, patient had hypoglycemic episode yesterday evening (FS 61), PM Lantus was held,  blood sugars trending within acceptable range today without new hypoglycemic episode. Patient is NPO for KATHLEEN, appears comfortable, family seen by the bedside.  URI: on management, stable, monitored.  Frequent Falls: On fall precautions, PT eval.  HTN: Controlled,  on antihypertensive medications.  HLD: Controlled, on statin.          Gentry Cole MD  Cell: [phone]  Office: [phone]

## 2019-12-23 NOTE — PROGRESS NOTE ADULT - SUBJECTIVE AND OBJECTIVE BOX
HPI:  Patient seen and examined at bedside prior to KATHLEEN/DCCV.  NPO post midnight.    Review Of Systems:           Respiratory: No shortness of breath, cough, or wheezing  Cardiovascular: No chest pain or palpitations  10 point review of systems is otherwise negative except as mentioned above        Medications:  acetaminophen  IVPB .. 1000 milliGRAM(s) IV Intermittent once  artificial tears (preservative free) Ophthalmic Solution 1 Drop(s) Both EYES four times a day PRN  cholecalciferol 2000 Unit(s) Oral daily  dextrose 40% Gel 15 Gram(s) Oral once PRN  dextrose 5% + sodium chloride 0.45%. 1000 milliLiter(s) IV Continuous <Continuous>  dextrose 5%. 1000 milliLiter(s) IV Continuous <Continuous>  dextrose 50% Injectable 12.5 Gram(s) IV Push once  dextrose 50% Injectable 25 Gram(s) IV Push once  dextrose 50% Injectable 25 Gram(s) IV Push once  diltiazem    Tablet 60 milliGRAM(s) Oral every 8 hours  enoxaparin Injectable 90 milliGRAM(s) SubCutaneous once  enoxaparin Injectable 90 milliGRAM(s) SubCutaneous every 12 hours  fluticasone propionate 50 MICROgram(s)/spray Nasal Spray 1 Spray(s) Both Nostrils two times a day  glucagon  Injectable 1 milliGRAM(s) IntraMuscular once PRN  hydrochlorothiazide 12.5 milliGRAM(s) Oral daily  insulin glargine Injectable (LANTUS) 40 Unit(s) SubCutaneous at bedtime  insulin lispro (HumaLOG) corrective regimen sliding scale   SubCutaneous three times a day before meals  insulin lispro (HumaLOG) corrective regimen sliding scale   SubCutaneous at bedtime  insulin lispro Injectable (HumaLOG) 8 Unit(s) SubCutaneous three times a day before meals  ipratropium  for Nebulization. 500 MICROGram(s) Nebulizer once  labetalol 200 milliGRAM(s) Oral two times a day  loratadine 10 milliGRAM(s) Oral daily  losartan 100 milliGRAM(s) Oral daily  multivitamin/minerals 1 Tablet(s) Oral daily  polyethylene glycol 3350 17 Gram(s) Oral daily  potassium chloride   Solution 20 milliEquivalent(s) Oral once  senna 2 Tablet(s) Oral at bedtime  tamsulosin 0.4 milliGRAM(s) Oral at bedtime    PAST MEDICAL & SURGICAL HISTORY:  Diabetes mellitus type 2 in nonobese  BPH (benign prostatic hyperplasia)  Hyperlipidemia  HTN (hypertension)  H/O arthroscopy: R knee    Vitals:  T(C): 36.2 (12-23-19 @ 11:57), Max: 36.4 (12-23-19 @ 04:45)  HR: 108 (12-23-19 @ 17:18) (73 - 108)  BP: 118/70 (12-23-19 @ 17:18) (92/54 - 118/70)  BP(mean): --  RR: 18 (12-23-19 @ 13:23) (18 - 18)  SpO2: 96% (12-23-19 @ 13:23) (94% - 100%)  Wt(kg): --  Daily     Daily   I&O's Summary    22 Dec 2019 07:01  -  23 Dec 2019 07:00  --------------------------------------------------------  IN: 480 mL / OUT: 200 mL / NET: 280 mL    23 Dec 2019 07:01  -  23 Dec 2019 19:36  --------------------------------------------------------  IN: 240 mL / OUT: 0 mL / NET: 240 mL        Physical Exam:  Appearance: Normal, well groomed, NAD  Eyes: PERRLA, EOMI, pink conjunctiva, no scleral icterus   HENT: Normal oral mucosa; old ecchymoses  Cardiovascular: irregular S1, S2, no murmur, rub, or gallop; no edema; no JVD  Respiratory: Clear to auscultation bilaterally  Gastrointestinal: Soft, non-tender, non-distended, BS+  Musculoskeletal: No clubbing or joint deformity   Neurologic: No focal weakness  Lymphatic: No lymphadenopathy  Psychiatry: AAOx3 with appropriate mood and affect  Skin: No rashes, ecchymoses, or cyanosis                          12.8   10.70 )-----------( 221      ( 23 Dec 2019 07:45 )             39.6     12-23    143  |  103  |  26<H>  ----------------------------<  79  3.6   |  30  |  1.32<H>    Ca    9.5      23 Dec 2019 06:14  Mg     2.0     12-22

## 2019-12-23 NOTE — CHART NOTE - NSCHARTNOTEFT_GEN_A_CORE
Informed by RN that patient was hypoglycemic with a FS of 62. Pt seen and evaluated at bedside. Pt is currently asymptomatic and denies any dizziness, feelings of presyncope, or lightheadedness. Pt states he does not like the food he is receiving so he has not been eating well. Pt FS likely low in the setting of poor PO intake. Will repeat fingerstick in AM.  Pt is NPO for KATHLEEN w/ cardioversion so will start pt on D5W +1/2NS.   Will f/u with endocrinology in AM.  Will continue to assess and follow.    -Moisés Amado PA-C, [id number].

## 2019-12-23 NOTE — PROGRESS NOTE ADULT - PROBLEM SELECTOR PLAN 1
Will decrease Lantus to 40u at bedtime.  Will decrease Humalog to 8u before each meal and continue coverage scale. Will continue monitoring FS, log, will Follow up.  Patient counseled for compliance with consistent low carb diet, exercise as tolerated as out patient. Patient counseled for compliance with consistent low carb diet, exercise as tolerated as out patient.

## 2019-12-23 NOTE — CONSULT NOTE ADULT - SUBJECTIVE AND OBJECTIVE BOX
CHIEF COMPLAINT:    HISTORY OF PRESENT ILLNESS:      Allergies    No Known Allergies    Intolerances    	    MEDICATIONS:  diltiazem    Tablet 60 milliGRAM(s) Oral every 8 hours  enoxaparin Injectable 90 milliGRAM(s) SubCutaneous once  enoxaparin Injectable 90 milliGRAM(s) SubCutaneous every 12 hours  hydrochlorothiazide 12.5 milliGRAM(s) Oral daily  labetalol 200 milliGRAM(s) Oral two times a day  losartan 100 milliGRAM(s) Oral daily  tamsulosin 0.4 milliGRAM(s) Oral at bedtime      ipratropium  for Nebulization. 500 MICROGram(s) Nebulizer once  loratadine 10 milliGRAM(s) Oral daily    acetaminophen  IVPB .. 1000 milliGRAM(s) IV Intermittent once    polyethylene glycol 3350 17 Gram(s) Oral daily  senna 2 Tablet(s) Oral at bedtime    dextrose 40% Gel 15 Gram(s) Oral once PRN  dextrose 50% Injectable 12.5 Gram(s) IV Push once  dextrose 50% Injectable 25 Gram(s) IV Push once  dextrose 50% Injectable 25 Gram(s) IV Push once  glucagon  Injectable 1 milliGRAM(s) IntraMuscular once PRN  insulin glargine Injectable (LANTUS) 55 Unit(s) SubCutaneous at bedtime  insulin lispro (HumaLOG) corrective regimen sliding scale   SubCutaneous three times a day before meals  insulin lispro (HumaLOG) corrective regimen sliding scale   SubCutaneous at bedtime  insulin lispro Injectable (HumaLOG) 10 Unit(s) SubCutaneous three times a day before meals    artificial tears (preservative free) Ophthalmic Solution 1 Drop(s) Both EYES four times a day PRN  cholecalciferol 2000 Unit(s) Oral daily  dextrose 5% + sodium chloride 0.45%. 1000 milliLiter(s) IV Continuous <Continuous>  dextrose 5%. 1000 milliLiter(s) IV Continuous <Continuous>  fluticasone propionate 50 MICROgram(s)/spray Nasal Spray 1 Spray(s) Both Nostrils two times a day  multivitamin/minerals 1 Tablet(s) Oral daily  potassium chloride   Solution 20 milliEquivalent(s) Oral once      PAST MEDICAL & SURGICAL HISTORY:  Diabetes mellitus type 2 in nonobese  BPH (benign prostatic hyperplasia)  Hyperlipidemia  HTN (hypertension)  H/O arthroscopy: R knee      FAMILY HISTORY:  FH: type 2 diabetes: sister      SOCIAL HISTORY:    [ ] Non-smoker  [ ] Smoker  [ ] Alcohol      REVIEW OF SYSTEMS:  See HPI. Otherwise, 10 point ROS done and otherwise negative.    PHYSICAL EXAM:  T(C): 36.4 (12-23-19 @ 04:45), Max: 36.4 (12-22-19 @ 12:00)  HR: 80 (12-23-19 @ 09:08) (80 - 106)  BP: 106/68 (12-23-19 @ 09:08) (94/60 - 115/75)  RR: 18 (12-23-19 @ 04:45) (18 - 18)  SpO2: 94% (12-23-19 @ 04:45) (94% - 100%)  Wt(kg): --  I&O's Summary    22 Dec 2019 07:01  -  23 Dec 2019 07:00  --------------------------------------------------------  IN: 480 mL / OUT: 200 mL / NET: 280 mL        Appearance: Normal	  HEENT:   Normal oral mucosa, PERRL, EOMI	  Lymphatic: No lymphadenopathy  Cardiovascular: Normal S1 S2, No JVD, No murmurs, No edema  Respiratory: Lungs clear to auscultation	  Psychiatry: A & O x 3, Mood & affect appropriate  Gastrointestinal:  Soft, Non-tender, + BS	  Skin: No rashes, No ecchymoses, No cyanosis	  Neurologic: Non-focal  Extremities: Normal range of motion, No clubbing, cyanosis or edema  Vascular: Peripheral pulses palpable 2+ bilaterally        LABS:	 	    CBC Full  -  ( 23 Dec 2019 07:45 )  WBC Count : 10.70 K/uL  Hemoglobin : 12.8 g/dL  Hematocrit : 39.6 %  Platelet Count - Automated : 221 K/uL  Mean Cell Volume : 93.6 fl  Mean Cell Hemoglobin : 30.3 pg  Mean Cell Hemoglobin Concentration : 32.3 gm/dL  Auto Neutrophil # : x  Auto Lymphocyte # : x  Auto Monocyte # : x  Auto Eosinophil # : x  Auto Basophil # : x  Auto Neutrophil % : x  Auto Lymphocyte % : x  Auto Monocyte % : x  Auto Eosinophil % : x  Auto Basophil % : x    12-23    143  |  103  |  26<H>  ----------------------------<  79  3.6   |  30  |  1.32<H>  12-22    144  |  105  |  26<H>  ----------------------------<  95  3.5   |  29  |  1.19    Ca    9.5      23 Dec 2019 06:14  Ca    9.2      22 Dec 2019 06:52  Mg     2.0     12-22        proBNP:   Lipid Profile:   HgA1c:   TSH:       CARDIAC MARKERS:                TELEMETRY: 	    ECG:  	  RADIOLOGY:  OTHER: 	    PREVIOUS DIAGNOSTIC TESTING:    [ ] Echocardiogram:  [ ]  Catheterization:  [ ] Stress Test:  	  	  ASSESSMENT/PLAN: CHIEF COMPLAINT:  s/p Fall    HISTORY OF PRESENT ILLNESS:  85 yo male PMhx DM on insulin, HTN, BPH, HLD presents to the ED with unwitnessed fall the day prior with inability to get up and ambulate. Pt reports he slipped off the bed the day prior onto floor and was unable to get up.  Wife attempted to help him up but was unable, so placed pillow under head and pt slept on floor overnight. This AM pt was still unable to get up so EMS was called. Patient states he uses a cane to ambulate outside the house, but ambulate independently.  Patient had a trip and fall outside the house in [facility], resulting in bruising and hematoma R chin, abd, and R frontal scalp, evaluated here in ED, and discharged home.  Patient has been feeling congested for the past couple days, decrease PO intake, and GRANT.  Patient denies fever (but on Ibuprofen 800 3x daily for back and leg pain), chest pain, cough, LOC, dysuria or abd pain.  No sick contact.  c/o gen weakness and inability to ambulate      Allergies    No Known Allergies    Intolerances    	  MEDICATIONS:  diltiazem    Tablet 60 milliGRAM(s) Oral every 8 hours  enoxaparin Injectable 90 milliGRAM(s) SubCutaneous once  enoxaparin Injectable 90 milliGRAM(s) SubCutaneous every 12 hours  hydrochlorothiazide 12.5 milliGRAM(s) Oral daily  labetalol 200 milliGRAM(s) Oral two times a day  losartan 100 milliGRAM(s) Oral daily  tamsulosin 0.4 milliGRAM(s) Oral at bedtime  ipratropium  for Nebulization. 500 MICROGram(s) Nebulizer once  loratadine 10 milliGRAM(s) Oral daily  acetaminophen  IVPB .. 1000 milliGRAM(s) IV Intermittent once  polyethylene glycol 3350 17 Gram(s) Oral daily  senna 2 Tablet(s) Oral at bedtime  dextrose 40% Gel 15 Gram(s) Oral once PRN  dextrose 50% Injectable 12.5 Gram(s) IV Push once  dextrose 50% Injectable 25 Gram(s) IV Push once  dextrose 50% Injectable 25 Gram(s) IV Push once  glucagon  Injectable 1 milliGRAM(s) IntraMuscular once PRN  insulin glargine Injectable (LANTUS) 55 Unit(s) SubCutaneous at bedtime  insulin lispro (HumaLOG) corrective regimen sliding scale   SubCutaneous three times a day before meals  insulin lispro (HumaLOG) corrective regimen sliding scale   SubCutaneous at bedtime  insulin lispro Injectable (HumaLOG) 10 Unit(s) SubCutaneous three times a day before meals  artificial tears (preservative free) Ophthalmic Solution 1 Drop(s) Both EYES four times a day PRN  cholecalciferol 2000 Unit(s) Oral daily  dextrose 5% + sodium chloride 0.45%. 1000 milliLiter(s) IV Continuous <Continuous>  dextrose 5%. 1000 milliLiter(s) IV Continuous <Continuous>  fluticasone propionate 50 MICROgram(s)/spray Nasal Spray 1 Spray(s) Both Nostrils two times a day  multivitamin/minerals 1 Tablet(s) Oral daily  potassium chloride   Solution 20 milliEquivalent(s) Oral once      PAST MEDICAL & SURGICAL HISTORY:  Diabetes mellitus type 2 in nonobese  BPH (benign prostatic hyperplasia)  Hyperlipidemia  HTN (hypertension)  H/O arthroscopy: R knee      FAMILY HISTORY:  FH: type 2 diabetes: sister      SOCIAL HISTORY:    [ ] Non-smoker  [ ] Smoker  [ ] Alcohol      REVIEW OF SYSTEMS:  See HPI. Patient is alert but confused, oriented to self and location     PHYSICAL EXAM:  T(C): 36.4 (12-23-19 @ 04:45), Max: 36.4 (12-22-19 @ 12:00)  HR: 80 (12-23-19 @ 09:08) (80 - 106)  BP: 106/68 (12-23-19 @ 09:08) (94/60 - 115/75)  RR: 18 (12-23-19 @ 04:45) (18 - 18)  SpO2: 94% (12-23-19 @ 04:45) (94% - 100%)  Wt(kg): --  I&O's Summary    22 Dec 2019 07:01  -  23 Dec 2019 07:00  --------------------------------------------------------  IN: 480 mL / OUT: 200 mL / NET: 280 mL        Appearance: Normal	  HEENT: Normocephalic, atraumatic, normal oral mucosa  Cardiovascular: irregular S1 S2, occ tachycardia, No JVD, No murmurs, No edema  Respiratory: Lungs clear to auscultation	  Psychiatry: A & O x 2, calm   Gastrointestinal:  Soft, Non-tender, + BS	  Skin: No rashes, No ecchymoses, No cyanosis	  Extremities: Normal range of motion, No clubbing, cyanosis or edema  Vascular: Peripheral pulses palpable 2+ bilaterally        LABS:	 	    CBC Full  -  ( 23 Dec 2019 07:45 )  WBC Count : 10.70 K/uL  Hemoglobin : 12.8 g/dL  Hematocrit : 39.6 %  Platelet Count - Automated : 221 K/uL  Mean Cell Volume : 93.6 fl  Mean Cell Hemoglobin : 30.3 pg  Mean Cell Hemoglobin Concentration : 32.3 gm/dL  Auto Neutrophil # : x  Auto Lymphocyte # : x  Auto Monocyte # : x  Auto Eosinophil # : x  Auto Basophil # : x  Auto Neutrophil % : x  Auto Lymphocyte % : x  Auto Monocyte % : x  Auto Eosinophil % : x  Auto Basophil % : x    12-23    143  |  103  |  26<H>  ----------------------------<  79  3.6   |  30  |  1.32<H>  12-22    144  |  105  |  26<H>  ----------------------------<  95  3.5   |  29  |  1.19    Ca    9.5      23 Dec 2019 06:14  Ca    9.2      22 Dec 2019 06:52  Mg     2.0     12-22      TELEMETRY: Atrial Fibrillation 's   	    ECG 12/19/19: Atrial Fibrillation @ 111 bpm, QRSd 120ms    ECG 12/18/19: Atrial Fibrillation @ 171 bpm, QRSd 110ms    ECG 12/15/19: NSR @ 98 bpm, CO 168ms, QRSd 136ms, RBBB   	  Study Date: 12/20/2019    PROCEDURE: Transthoracic echocardiogram with 2-D, M-Mode  and complete spectral and color flow Doppler. Verbal  consent was obtained for injection of  Ultrasonic Enhancing  Agent following a discussion of risks and benefits.  Following intravenous injection of Ultrasonic Enhancing  Agent , harmonic imaging was performed.  INDICATION: Abnormal electrocardiogram  (R94.31 )  ------------------------------------------------------------------------  Dimensions:    Normal Values:  LA:     2.7    2.0 - 4.0 cm  Ao:            2.0 - 3.8 cm  SEPTUM: 0.9    0.6 - 1.2 cm  PWT:    0.8  0.6 - 1.1 cm  LVIDd:  3.3    3.0 - 5.6 cm  LVIDs:  2.4    1.8 - 4.0 cm  Derived variables:  LVMI: 40 g/m2  RWT: 0.47  EF (Visual Estimate): 65 %  Doppler Peak Velocity (m/sec): MV=1.7 AoV=1.3  ------------------------------------------------------------------------  Observations:  Mitral Valve: Mitral annular calcification. Calcified  mitral leaflets.  Mean mitral gradient 2.0 mmHg at 89/min.  Aortic Valve/Aorta: Calcified aortic valve with normal  opening.  Mild aortic regurgitation.  Normal aortic root.  Left Atrium: Mild left atrial enlargement.  Left Ventricle: Endocardial visualization enhanced with  intravenous injection of Ultrasonic Enhancing Agent  (Definity).  Normal left ventricular internal dimensions and wall  thicknesses.  Normal left ventricular systolic function. No segmental  wall motion abnormalities.  Right Heart: Normal right atrium. Normal right ventricular  size and systolic function. Normal tricuspid valve. Minimal  tricuspid regurgitation. Normal pulmonic valve.  Pericardium/Pleura: Normal pericardium with no pericardial  effusion.  Hemodynamic: No evidence of pulmonary hypertension.  No PFO seen with color Doppler.  ------------------------------------------------------------------------  Conclusions:  Endocardial visualization enhanced with intravenous  injection of Ultrasonic Enhancing Agent (Definity).  Normal left ventricular systolic function. No segmental  wall motion abnormalities.  ------------------------------------------------------------------------  Confirmed on  12/20/2019 - 14:28:17 by Chriss Rowland M.D.  ------------------------------------------------------------------------

## 2019-12-23 NOTE — PROGRESS NOTE ADULT - SUBJECTIVE AND OBJECTIVE BOX
HOLLIE STEVENS  84y Male  MRN:[id number]    Patient is a 84y old  Male who presents with a chief complaint of s/p fall (15 Dec 2019 22:05)    HPI:  83 yo male PMhx DM on insulin, HTN, BPH, HLD presents to the ED with unwitnessed fall the day prior with inability to get up and ambulate. Pt reports he slipped off the bed the day prior onto floor and was unable to get up.  Wife attempted to help him up but was unable, so placed pillow under head and pt slept on floor overnight. This AM pt was still unable to get up so EMS was called. Patient states he uses a cane to ambulate outside the house, but ambulate independently.  Patient had [place] and [place], resulting in bruising and hematoma R chin, abd, and R frontal scalp, evaluated here in ED, and discharged home.  Patient has been feeling congested for the past couple days, decrease PO intake, and GRANT.  Patient denies fever (but on Ibuprofen 800 3x daily for back and leg pain), chest pain, cough, LOC, dysuria or abd pain.  No sick contact.  c/o gen weakness and inability to ambulate (15 Dec 2019 22:05)      Patient seen and evaluated at bedside. No acute events overnight except as noted.    Interval HPI: no events o/n.    Tele: afib/ flutter     PAST MEDICAL & SURGICAL HISTORY:  Diabetes mellitus type 2 in nonobese  BPH (benign prostatic hyperplasia)  Hyperlipidemia  HTN (hypertension)  H/O arthroscopy: R knee      REVIEW OF SYSTEMS:  as per hpi       VITALS:  Vital Signs Last 24 Hrs  T(C): 36.2 (23 Dec 2019 11:57), Max: 36.4 (23 Dec 2019 04:45)  T(F): 97.2 (23 Dec 2019 11:57), Max: 97.5 (23 Dec 2019 04:45)  HR: 84 (23 Dec 2019 14:15) (73 - 106)  BP: 96/56 (23 Dec 2019 14:15) (92/54 - 115/75)  BP(mean): --  RR: 18 (23 Dec 2019 13:23) (18 - 18)  SpO2: 96% (23 Dec 2019 13:23) (94% - 100%)    PHYSICAL EXAM:  GENERAL: NAD, well-developed  HEAD:  +head trauma, +ecchymosis on face/neck  EYES: EOMI, PERRLA, conjunctiva and sclera clear  NECK: Supple, No JVD  CHEST/LUNG: b/l congestion/wheeze   HEART: S1, S2; irreg irreg. No murmurs, rubs, or gallops  ABDOMEN: Soft, Nontender, Nondistended; Bowel sounds present  EXTREMITIES:  2+ Peripheral Pulses, No clubbing, cyanosis, or edema  PSYCH: Normal affect  NEUROLOGY: AAOX3; non-focal  SKIN: No rashes or lesions    Consultant(s) Notes Reviewed:  [x ] YES  [ ] NO  Care Discussed with Consultants/Other Providers [ x] YES  [ ] NO    MEDS:  MEDICATIONS  (STANDING):  acetaminophen  IVPB .. 1000 milliGRAM(s) IV Intermittent once  cholecalciferol 2000 Unit(s) Oral daily  dextrose 5% + sodium chloride 0.45%. 1000 milliLiter(s) (75 mL/Hr) IV Continuous <Continuous>  dextrose 5%. 1000 milliLiter(s) (50 mL/Hr) IV Continuous <Continuous>  dextrose 50% Injectable 12.5 Gram(s) IV Push once  dextrose 50% Injectable 25 Gram(s) IV Push once  dextrose 50% Injectable 25 Gram(s) IV Push once  diltiazem    Tablet 60 milliGRAM(s) Oral every 8 hours  enoxaparin Injectable 90 milliGRAM(s) SubCutaneous once  enoxaparin Injectable 90 milliGRAM(s) SubCutaneous every 12 hours  fluticasone propionate 50 MICROgram(s)/spray Nasal Spray 1 Spray(s) Both Nostrils two times a day  hydrochlorothiazide 12.5 milliGRAM(s) Oral daily  insulin glargine Injectable (LANTUS) 40 Unit(s) SubCutaneous at bedtime  insulin lispro (HumaLOG) corrective regimen sliding scale   SubCutaneous three times a day before meals  insulin lispro (HumaLOG) corrective regimen sliding scale   SubCutaneous at bedtime  insulin lispro Injectable (HumaLOG) 8 Unit(s) SubCutaneous three times a day before meals  ipratropium  for Nebulization. 500 MICROGram(s) Nebulizer once  labetalol 200 milliGRAM(s) Oral two times a day  loratadine 10 milliGRAM(s) Oral daily  losartan 100 milliGRAM(s) Oral daily  multivitamin/minerals 1 Tablet(s) Oral daily  polyethylene glycol 3350 17 Gram(s) Oral daily  potassium chloride   Solution 20 milliEquivalent(s) Oral once  senna 2 Tablet(s) Oral at bedtime  tamsulosin 0.4 milliGRAM(s) Oral at bedtime    MEDICATIONS  (PRN):  artificial tears (preservative free) Ophthalmic Solution 1 Drop(s) Both EYES four times a day PRN Dry Eyes  dextrose 40% Gel 15 Gram(s) Oral once PRN Blood Glucose LESS THAN 70 milliGRAM(s)/deciliter  glucagon  Injectable 1 milliGRAM(s) IntraMuscular once PRN Glucose LESS THAN 70 milligrams/deciliter        ALLERGIES:  No Known Allergies      LABS:                                                      12.8   10.70 )-----------( 221      ( 23 Dec 2019 07:45 )             39.6   12-23    143  |  103  |  26<H>  ----------------------------<  79  3.6   |  30  |  1.32<H>    Ca    9.5      23 Dec 2019 06:14  Mg     2.0     12-22                  < from: Xray Chest 1 View- PORTABLE-Urgent (12.16.19 @ 12:45) >  IMPRESSION:    Clear lungs.    < end of copied text >         < from: CT Thoracic Spine Reform No Cont (12.15.19 @ 17:20) >    IMPRESSION:   CT BRAIN: No acute intracranial bleeding, mass effect, or shift.   Resolving right frontal scalp hematoma, now small compared to prior CT   head from 11/28/2019.     CT CERVICAL SPINE: No acute fracture subluxation. Multilevel   spondylosis..     CT THORACIC SPINE: No acute fracture subluxation. Multilevel spondylosis.   Increased density along the left posterior lateral aspect of the central   canal at the T6-7 level possibly representing a meningioma. Follow-up   thoracic spine MRI is recommended.  Partially imaged abdomen demonstrates right renal hypodensity and nodular   thickening of the bilateral adrenal glands better evaluated on same day   CT abdomen pelvis.     CT LUMBAR SPINE: No acute fracture subluxation. Multilevel spondylosis.    < end of copied text >

## 2019-12-23 NOTE — PROGRESS NOTE ADULT - ASSESSMENT
84 year-old gentleman with multiple cardiovascular risk factors as above presents with falls and weakness of unclear etiology.  Airways sound tight, but patient is not grossly volume overloaded.  No evidence of recent ACS or decompensated heart failure.    New onset atrial fibrillation is likely unrelated to his fall.    EP consult for cardioversion.  Uptitrate diltiazem as tolerated. Can change beta-blocker from labetalol to more cardioselective metoprolol for additional antiarrhythmic effect if needed.  NPO after midnight last night for KATHLEEN and DCCV today.    Continue therapeutic anticoagulation with plans to discharge on Xarelto 20 mg daily or Eliquis 5 mg BID for patient with age > 80, weight > 60 kg, and creatinine < 1.5 mg/dL.

## 2019-12-23 NOTE — PROGRESS NOTE ADULT - SUBJECTIVE AND OBJECTIVE BOX
[facility] - [facility] - PROGRESS NOTE    CHIEF COMPLAINT: sinusitis; bronchospasm; rhinorrhea; nasal congestion; post nasal drip; AF with RVR; s/p fall    INTERVAL HISTORY: EP service called today for KATHLEEN/DCCV; remains in atrial fibrillation with episodes of atrial flutter; mental status almost back to baseline off systemic steroids but remains somewhat confused; no chest pain/pressure or palpitations; much less rhinorrhea, nasal congestion and post-nasal drip; no shortness of breath room air; no cough or sputum production; no chest congestion or wheeze; no fevers, chills or sweats;    REVIEW OF SYSTEMS:  Constitutional: As per interval history  HEENT: As per interval history  CV: As per interval history  Resp: As per interval history  GI: Within normal limits   : Within normal limits  Musculoskeletal: Within normal limits  Skin: Within normal limits  Neurological: Within normal limits  Psychiatric: confusion -> improved  Endocrine: Within normal limits  Hematologic/Lymphatic: Within normal limits  Allergic/Immunologic: Within normal limits    MEDICATIONS:     Pulmonary "  ipratropium  for Nebulization. 500 MICROGram(s) Nebulizer once  loratadine 10 milliGRAM(s) Oral daily    Anti-microbials:    Cardiovascular:  diltiazem    Tablet 60 milliGRAM(s) Oral every 8 hours  hydrochlorothiazide 12.5 milliGRAM(s) Oral daily  labetalol 200 milliGRAM(s) Oral two times a day  losartan 100 milliGRAM(s) Oral daily  tamsulosin 0.4 milliGRAM(s) Oral at bedtime    Other:  acetaminophen  IVPB .. 1000 milliGRAM(s) IV Intermittent once  cholecalciferol 2000 Unit(s) Oral daily  dextrose 5% + sodium chloride 0.45%. 1000 milliLiter(s) IV Continuous <Continuous>  dextrose 5%. 1000 milliLiter(s) IV Continuous <Continuous>  dextrose 50% Injectable 12.5 Gram(s) IV Push once  dextrose 50% Injectable 25 Gram(s) IV Push once  dextrose 50% Injectable 25 Gram(s) IV Push once  enoxaparin Injectable 90 milliGRAM(s) SubCutaneous once  enoxaparin Injectable 90 milliGRAM(s) SubCutaneous every 12 hours  fluticasone propionate 50 MICROgram(s)/spray Nasal Spray 1 Spray(s) Both Nostrils two times a day  insulin glargine Injectable (LANTUS) 55 Unit(s) SubCutaneous at bedtime  insulin lispro (HumaLOG) corrective regimen sliding scale   SubCutaneous three times a day before meals  insulin lispro (HumaLOG) corrective regimen sliding scale   SubCutaneous at bedtime  insulin lispro Injectable (HumaLOG) 10 Unit(s) SubCutaneous three times a day before meals  multivitamin/minerals 1 Tablet(s) Oral daily  polyethylene glycol 3350 17 Gram(s) Oral daily  potassium chloride   Solution 20 milliEquivalent(s) Oral once  senna 2 Tablet(s) Oral at bedtime    MEDICATIONS  (PRN):  artificial tears (preservative free) Ophthalmic Solution 1 Drop(s) Both EYES four times a day PRN Dry Eyes  dextrose 40% Gel 15 Gram(s) Oral once PRN Blood Glucose LESS THAN 70 milliGRAM(s)/deciliter  glucagon  Injectable 1 milliGRAM(s) IntraMuscular once PRN Glucose LESS THAN 70 milligrams/deciliter        OBJECTIVE:    I&O's Detail    22 Dec 2019 07:01  -  23 Dec 2019 07:00  --------------------------------------------------------  IN:    Oral Fluid: 480 mL  Total IN: 480 mL    OUT:    Voided: 200 mL  Total OUT: 200 mL    Total NET: 280 mL    POCT Blood Glucose.: 170 mg/dL (23 Dec 2019 12:03)  POCT Blood Glucose.: 98 mg/dL (23 Dec 2019 08:05)  POCT Blood Glucose.: 77 mg/dL (23 Dec 2019 06:16)  POCT Blood Glucose.: 140 mg/dL (22 Dec 2019 22:06)  POCT Blood Glucose.: 62 mg/dL (22 Dec 2019 21:45)  POCT Blood Glucose.: 61 mg/dL (22 Dec 2019 21:43)  POCT Blood Glucose.: 162 mg/dL (22 Dec 2019 16:37)      PHYSICAL EXAM:       ICU Vital Signs Last 24 Hrs  T(C): 36.2 (23 Dec 2019 11:57), Max: 36.4 (23 Dec 2019 04:45)  T(F): 97.2 (23 Dec 2019 11:57), Max: 97.5 (23 Dec 2019 04:45)  HR: 73 (23 Dec 2019 11:57) (73 - 106)  BP: 92/54 (23 Dec 2019 11:57) (92/54 - 115/75)  BP(mean): --  ABP: --  ABP(mean): --  RR: 18 (23 Dec 2019 11:57) (18 - 18)  SpO2: 98% (23 Dec 2019 11:57) (94% - 100%) on room air     General: Awake. Alert. Cooperative. Poor memory. No distress. Appears stated age. 	  HEENT: Atraumatic. Normocephalic. Anicteric. Normal oral mucosa. PERRL. EOMI. Decreased nasal mucosal injection and erythema.  Neck: Supple. Trachea midline. Thyroid without enlargement/tenderness/nodules. No carotid bruit. No JVD. Short and wide  Cardiovascular: Irregularly irregular rate and rhythm. S1 S2 normal. No murmurs, rubs or gallops.  Respiratory: Respirations unlabored. No wheeze. No curvature.  Abdomen: Soft. Non-tender. Non-distended. No organomegaly. No masses. Normal bowel sounds. Obese.  Extremities: Warm to touch. No clubbing or cyanosis. No pedal edema.  Pulses: 2+ peripheral pulses all extremities.	  Skin: Ecchymoses around right eye and on right chin/neck. Hematoma on the top of the right sided of the head.  Lymph Nodes: Cervical, supraclavicular and axillary nodes normal  Neurological: Motor and sensory examination equal and normal. A and O x 2  Psychiatry: Appropriate mood and affect.    LABS:                          12.8   10.70 )-----------( 221      ( 23 Dec 2019 07:45 )             39.6     CBC    WBC  10.70 <==, 10.93 <==, 10.29 <==, 11.39 <==, 8.95 <==, 10.18 <==, 11.66 <==    Hemoglobin  12.8 <<==, 12.8 <<==, 14.0 <<==, 13.9 <<==, 13.9 <<==, 14.2 <<==, 14.0 <<==    Hematocrit  39.6 <==, 39.3 <==, 43.0 <==, 43.4 <==, 41.6 <==, 43.9 <==, 42.6 <==    Platelets  221 <==, 212 <==, 231 <==, 218 <==, 243 <==, 269 <==, 237 <==      143  |  103  |  26<H>  ----------------------------<  79    12-23  3.6   |  30  |  1.32<H>      LYTES    sodium  143 <==, 144 <==, 142 <==, 142 <==, 146 <==, 142 <==, 142 <==    potassium   3.6 <==, 3.5 <==, 3.4 <==, 3.1 <==, 3.7 <==, 3.3 <==, 3.4 <==    chloride  103 <==, 105 <==, 100 <==, 104 <==, 102 <==, 104 <==, 104 <==    carbon dioxide  30 <==, 29 <==, 27 <==, 23 <==, 22 <==, 22 <==, 21 <==    =============================================================================================  RENAL FUNCTION:    Creatinine:   1.32  <<==, 1.19  <<==, 1.16  <<==, 1.29  <<==, 1.25  <<==, 1.23  <<==, 1.04  <<==    BUN:   26 <==, 26 <==, 23 <==, 28 <==, 29 <==, 27 <==, 21 <==    ============================================================================================    calcium   9.5 <==, 9.2 <==, 9.2 <==, 8.8 <==, 8.9 <==, 9.1 <==, 8.7 <==    phos   2.5 <==    mag   2.0 <==, 1.8 <==, 1.9 <==    ============================================================================================  LFTs    AST:   40 <==     ALT:  31  <==     AP:  58  <=    Bili:  0.4  <=      PT/INR - ( 15 Dec 2019 15:36 )   PT: 13.7 sec;   INR: 1.20 ratio      Venous Blood Gas:   @ 18:47  7.48/33/109/24/98  VBG Lactate: 1.8    CARDIAC MARKERS ( 15 Dec 2019 20:49 )   U/L /CKMB x     /CKMB Units x        troponin x        CARDIAC MARKERS ( 15 Dec 2019 15:36 )   U/L /CKMB x     /CKMB Units x        troponin x        MICROBIOLOGY:     Rapid Respiratory Viral Panel (12.16.19 @ 10:27)    Rapid RVP Result: NotDetec: This Respiratory Panel uses polymerase chain reaction (PCR) to detect for  adenovirus; coronavirus (HKU1, NL63, 229E, OC43); human metapneumovirus  (hMPV); human enterovirus/rhinovirus (Entero/RV); influenza A; influenza  A/H1; influenza A/H3; influenza A/H1-2009; influenza B; parainfluenza  viruses 1, 2, 3, 4; respiratory syncytial virus; Mycoplasma pneumoniae;  and Chlamydophila pneumoniae.    Urinalysis Basic - ( 15 Dec 2019 18:05 )    Color: Yellow / Appearance: Clear / S.029 / pH: x  Gluc: x / Ketone: Small  / Bili: Negative / Urobili: Negative   Blood: x / Protein: 30 mg/dL / Nitrite: Negative   Leuk Esterase: Negative / RBC: 4 /hpf / WBC 3 /HPF   Sq Epi: x / Non Sq Epi: 2 /hpf / Bacteria: Negative    Culture - Urine (12.15.19 @ 22:11)    Specimen Source: .Urine Clean Catch (Midstream)    Culture Results:   Aerococcus species  "Aerococcus spp. are predictably susceptible to penicillin,  ampicillin, tetracycline, and vancomycin.  All isolates are  resistant to sulfonamides"  <10,000 CFU/ml Normal Urogenital pema present    RADIOLOGY:  [x] Chest radiographs reviewed and interpreted by me    EXAM:  XR CHEST AP OR PA 1V                          PROCEDURE DATE:  12/15/2019      INTERPRETATION:  CLINICAL INFORMATION: Cough and fever.    EXAM: AP chest radiograph    COMPARISON: None    FINDINGS:  The heart is normal in size. Calcified aortic knob. No focal   consolidations. No pleural effusion or pneumothorax.  The visualized osseous structures demonstrate no acute pathology.    IMPRESSION:  Clear lungs    KATHY SNOW M.D., RADIOLOGY RESIDENT  This document has been electronically signed.  CAITIE MEJIAS M.D., ATTENDING RADIOLOGIST  This document has been electronically signed. Dec 16 2019  9:38AM  ---------------------------------------------------------------------------------------------------------------    EXAM:  CT ABDOMEN AND PELVIS IC                          EXAM:  CT CHEST IC                          PROCEDURE DATE:  12/15/2019      INTERPRETATION:  CLINICAL INFORMATION: Trauma. Status post fall with   diffuse abdominal tenderness.    COMPARISON: None.    PROCEDURE:   CT of the Chest, Abdomen and Pelvis was performed with intravenous   contrast.   Imaging was performed through the chest in the arterial phase followed by   imaging of the abdomen and pelvis in the portal venous phase.  Intravenous contrast: 90 ml Omnipaque 350. 10 ml discarded.  Oral contrast: None.  Sagittal and coronal reformats were performed.    FINDINGS:    CHEST:     LUNGS AND LARGE AIRWAYS: Patent central airways. No consolidation.  PLEURA: No pleural effusion. No pneumothorax.  VESSELS: Atherosclerotic changes of the aorta and coronary arteries.  HEART: Heart size is normal. No pericardial effusion. Mitral annulus and   aortic valve calcifications.  MEDIASTINUM AND KARLA: No lymphadenopathy.  CHEST WALL AND LOWER NECK: Within normal limits.    ABDOMEN AND PELVIS:    LIVER: Steatosis.  BILE DUCTS: Normal caliber.  GALLBLADDER: Within normal limits.  SPLEEN: Within normal limits.  PANCREAS: Within normal limits.  ADRENALS: 7 mm indeterminate left adrenal nodule.. Right adrenal gland is   unremarkable..  KIDNEYS/URETERS: No renal stones or hydronephrosis. Right upper pole   exophytic complex cystic lesion with irregular thick mural thickening..   Bilateral renal subcentimeter hypodensities which are too small to   characterize. Punctate 2 mm nonobstructing calculus in the midpole of the   left kidney.    BLADDER: 5 cm left and 1 cm right posterior bladder diverticuli.    REPRODUCTIVE ORGANS: Prostate within normal limits.    BOWEL: No bowel obstruction. Appendix is normal.  PERITONEUM: Trace fluid in the right paracolic gutter. No   pneumoperitoneum.  VESSELS: Atherosclerotic changes.  RETROPERITONEUM/LYMPH NODES: No lymphadenopathy.    ABDOMINAL WALL: Small degree of ventral abdominal wall subcutaneous   stranding and skin thickening, left greater the right, likely related to   traumatic contusion. No discrete hematoma. Small bilateral fat-containing   inguinal hernias..  BONES: No acute fracture or dislocation. Degenerative changes.    IMPRESSION:   Trace amount of free fluid in the right paracolic gutter.    No visceral injury is identified.    Ventral abdominal wall subcutaneous stranding and skin thickening, left   greater the right, likely related to traumatic contusion. No discrete   hematoma.     Complex exophytic right renal cystic lesion and indeterminate left   adrenal nodule. Further characterization with nonemergent contrast   enhanced MRI is recommended.    ROULA BACH M.D., RADIOLOGY RESIDENT  This document has been electronically signed.  MARK GUNDERSON M.D., ATTENDING RADIOLOGIST  This document has been electronically signed. Dec 15 2019  6:35PM  ---------------------------------------------------------------------------------------------------------------    EXAM:  CT BRAIN                          PROCEDURE DATE:  2019      INTERPRETATION:  .    CLINICAL INFORMATION: Change in mental status. Status post fall.    TECHNIQUE: Multiple axial CT images of the head were obtained without contrast. Sagittal and coronal reconstructed images were acquired from the source data.    COMPARISON: Prior head CT study from 12/15/2019.    FINDINGS: There is no acute intracranial hemorrhage, mass effect, shift of the midline structures, herniation, extra-axial fluid collection, or hydrocephalus.    There is diffuse cerebral volume loss with prominence of the sulci, fissures, and cisternal spaces which is normal for the patient's age. 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    The paranasal sinuses and mastoid air cells are clear. The calvarium is intact. A mild amount of right frontal scalp soft tissue swelling is again appreciated. There is evidence of bilateral cataract removal.    IMPRESSION: No acute intracranial hemorrhage, mass effect, or shift of the midline structures.    JAY OLSEN M.D., ATTENDING RADIOLOGIST  This document has been electronically signed. Dec 19 2019  8:18AM  ---------------------------------------------------------------------------------------------------------------

## 2019-12-23 NOTE — PROGRESS NOTE ADULT - ASSESSMENT
83 yo male PMhx DM on insulin, HTN, BPH, HLD p/w generalized weakness and inability to ambulate after a fall, congestion with GRANT     congestion/wheezing  likely URI  abx stopped by pulshira yang  pulm follow up  improved     fevers  id consulted  check blood and urine cult - ngtd  abx now stopped   resolved     afib with rvr  AC with lovenox bid  rate control with cardizem  f/u echo  cont tele monitor  cards f/u  plan for dccv today    dysphagia diet as per swallow eval

## 2019-12-23 NOTE — PROGRESS NOTE ADULT - ASSESSMENT
ASSESSMENT:    atrial fibrillation with RVR -> likely related to beta-agonist medications    confusion and leukocytosis related to systemic steroids -> improved -> no evidence of acute CNS pathology on brain CT     rhinorrhea, post-nasal drip, nasal congestion -> URI/sinusitis although RVP is negative -> improved    bronchospasm -> resolved    likely obstructive sleep apnea    recurrent falls with mild elevation in CPK    HTN/HLD/DM    PLAN/RECOMMENDATIONS:    stable oxygenation on room air  observe off antibiotics  observe off systemic steroids  observe off nebs  flonase/astelin (not on formulary)/claritin   cardiology evaluation noted - hopefully for KATHLEEN/DCCV today  cardiac meds: diltiazem/labetalol/cozaar/HCTZ/full dose lovenox -> eliquis or xarelto  DVT prophylaxis  glucose control  out of bed and into the chair  physical therapy   outpatient balance and strengthening training  flomax    Will follow with you. Plan of care discussed with the patient and his family at bedside and the EP service.    Ignacio Rollins MD, [facility]  [phone]  Pulmonary Medicine

## 2019-12-24 VITALS — HEART RATE: 63 BPM | DIASTOLIC BLOOD PRESSURE: 65 MMHG | SYSTOLIC BLOOD PRESSURE: 106 MMHG

## 2019-12-24 LAB
GLUCOSE BLDC GLUCOMTR-MCNC: 111 MG/DL — HIGH (ref 70–99)
GLUCOSE BLDC GLUCOMTR-MCNC: 128 MG/DL — HIGH (ref 70–99)
GLUCOSE BLDC GLUCOMTR-MCNC: 132 MG/DL — HIGH (ref 70–99)
GLUCOSE BLDC GLUCOMTR-MCNC: 65 MG/DL — LOW (ref 70–99)
GLUCOSE BLDC GLUCOMTR-MCNC: 70 MG/DL — SIGNIFICANT CHANGE UP (ref 70–99)

## 2019-12-24 PROCEDURE — 97110 THERAPEUTIC EXERCISES: CPT

## 2019-12-24 PROCEDURE — 87581 M.PNEUMON DNA AMP PROBE: CPT

## 2019-12-24 PROCEDURE — 84132 ASSAY OF SERUM POTASSIUM: CPT

## 2019-12-24 PROCEDURE — 74177 CT ABD & PELVIS W/CONTRAST: CPT

## 2019-12-24 PROCEDURE — 83036 HEMOGLOBIN GLYCOSYLATED A1C: CPT

## 2019-12-24 PROCEDURE — 84295 ASSAY OF SERUM SODIUM: CPT

## 2019-12-24 PROCEDURE — 83605 ASSAY OF LACTIC ACID: CPT

## 2019-12-24 PROCEDURE — 85027 COMPLETE CBC AUTOMATED: CPT

## 2019-12-24 PROCEDURE — 82435 ASSAY OF BLOOD CHLORIDE: CPT

## 2019-12-24 PROCEDURE — 92610 EVALUATE SWALLOWING FUNCTION: CPT

## 2019-12-24 PROCEDURE — 80053 COMPREHEN METABOLIC PANEL: CPT

## 2019-12-24 PROCEDURE — 87486 CHLMYD PNEUM DNA AMP PROBE: CPT

## 2019-12-24 PROCEDURE — 85730 THROMBOPLASTIN TIME PARTIAL: CPT

## 2019-12-24 PROCEDURE — 82803 BLOOD GASES ANY COMBINATION: CPT

## 2019-12-24 PROCEDURE — 94640 AIRWAY INHALATION TREATMENT: CPT

## 2019-12-24 PROCEDURE — 72125 CT NECK SPINE W/O DYE: CPT

## 2019-12-24 PROCEDURE — 71260 CT THORAX DX C+: CPT

## 2019-12-24 PROCEDURE — 71045 X-RAY EXAM CHEST 1 VIEW: CPT

## 2019-12-24 PROCEDURE — 93005 ELECTROCARDIOGRAM TRACING: CPT

## 2019-12-24 PROCEDURE — 70450 CT HEAD/BRAIN W/O DYE: CPT

## 2019-12-24 PROCEDURE — 85610 PROTHROMBIN TIME: CPT

## 2019-12-24 PROCEDURE — 87040 BLOOD CULTURE FOR BACTERIA: CPT

## 2019-12-24 PROCEDURE — 81001 URINALYSIS AUTO W/SCOPE: CPT

## 2019-12-24 PROCEDURE — 82330 ASSAY OF CALCIUM: CPT

## 2019-12-24 PROCEDURE — 82962 GLUCOSE BLOOD TEST: CPT

## 2019-12-24 PROCEDURE — 97161 PT EVAL LOW COMPLEX 20 MIN: CPT

## 2019-12-24 PROCEDURE — 82565 ASSAY OF CREATININE: CPT

## 2019-12-24 PROCEDURE — 80048 BASIC METABOLIC PNL TOTAL CA: CPT

## 2019-12-24 PROCEDURE — 97116 GAIT TRAINING THERAPY: CPT

## 2019-12-24 PROCEDURE — 99233 SBSQ HOSP IP/OBS HIGH 50: CPT

## 2019-12-24 PROCEDURE — 82947 ASSAY GLUCOSE BLOOD QUANT: CPT

## 2019-12-24 PROCEDURE — 87086 URINE CULTURE/COLONY COUNT: CPT

## 2019-12-24 PROCEDURE — 83735 ASSAY OF MAGNESIUM: CPT

## 2019-12-24 PROCEDURE — C8929: CPT

## 2019-12-24 PROCEDURE — 99285 EMERGENCY DEPT VISIT HI MDM: CPT | Mod: 25

## 2019-12-24 PROCEDURE — 87633 RESP VIRUS 12-25 TARGETS: CPT

## 2019-12-24 PROCEDURE — 96365 THER/PROPH/DIAG IV INF INIT: CPT | Mod: XU

## 2019-12-24 PROCEDURE — 82550 ASSAY OF CK (CPK): CPT

## 2019-12-24 PROCEDURE — 84100 ASSAY OF PHOSPHORUS: CPT

## 2019-12-24 PROCEDURE — 85014 HEMATOCRIT: CPT

## 2019-12-24 PROCEDURE — 87798 DETECT AGENT NOS DNA AMP: CPT

## 2019-12-24 RX ORDER — MULTIVIT-MIN/FERROUS GLUCONATE 9 MG/15 ML
1 LIQUID (ML) ORAL
Qty: 0 | Refills: 0 | DISCHARGE

## 2019-12-24 RX ORDER — IBUPROFEN 200 MG
1 TABLET ORAL
Qty: 0 | Refills: 0 | DISCHARGE

## 2019-12-24 RX ORDER — LOSARTAN POTASSIUM 100 MG/1
1 TABLET, FILM COATED ORAL
Qty: 0 | Refills: 0 | DISCHARGE
Start: 2019-12-24

## 2019-12-24 RX ORDER — DULOXETINE HYDROCHLORIDE 30 MG/1
1 CAPSULE, DELAYED RELEASE ORAL
Qty: 0 | Refills: 0 | DISCHARGE

## 2019-12-24 RX ORDER — APIXABAN 2.5 MG/1
1 TABLET, FILM COATED ORAL
Qty: 60 | Refills: 0
Start: 2019-12-24 | End: 2020-01-22

## 2019-12-24 RX ORDER — INSULIN GLARGINE 100 [IU]/ML
70 INJECTION, SOLUTION SUBCUTANEOUS
Qty: 0 | Refills: 0 | DISCHARGE

## 2019-12-24 RX ORDER — LIRAGLUTIDE 6 MG/ML
1.8 INJECTION SUBCUTANEOUS
Qty: 0 | Refills: 0 | DISCHARGE

## 2019-12-24 RX ORDER — METFORMIN HYDROCHLORIDE 850 MG/1
2 TABLET ORAL
Qty: 0 | Refills: 0 | DISCHARGE

## 2019-12-24 RX ORDER — CHOLECALCIFEROL (VITAMIN D3) 125 MCG
1 CAPSULE ORAL
Qty: 0 | Refills: 0 | DISCHARGE

## 2019-12-24 RX ORDER — LORATADINE 10 MG/1
1 TABLET ORAL
Qty: 0 | Refills: 0 | DISCHARGE
Start: 2019-12-24

## 2019-12-24 RX ORDER — DILTIAZEM HCL 120 MG
1 CAPSULE, EXT RELEASE 24 HR ORAL
Qty: 30 | Refills: 0
Start: 2019-12-24 | End: 2020-01-22

## 2019-12-24 RX ORDER — FLUTICASONE PROPIONATE 50 MCG
1 SPRAY, SUSPENSION NASAL
Qty: 0 | Refills: 0 | DISCHARGE
Start: 2019-12-24

## 2019-12-24 RX ORDER — APIXABAN 2.5 MG/1
5 TABLET, FILM COATED ORAL
Refills: 0 | Status: DISCONTINUED | OUTPATIENT
Start: 2019-12-24 | End: 2019-12-24

## 2019-12-24 RX ORDER — SENNA PLUS 8.6 MG/1
2 TABLET ORAL
Qty: 60 | Refills: 0
Start: 2019-12-24 | End: 2020-01-22

## 2019-12-24 RX ORDER — INSULIN LISPRO 100/ML
8 VIAL (ML) SUBCUTANEOUS
Qty: 1 | Refills: 0
Start: 2019-12-24 | End: 2020-01-22

## 2019-12-24 RX ORDER — MULTIVIT-MIN/FERROUS GLUCONATE 9 MG/15 ML
1 LIQUID (ML) ORAL
Qty: 0 | Refills: 0 | DISCHARGE
Start: 2019-12-24

## 2019-12-24 RX ORDER — INSULIN GLARGINE 100 [IU]/ML
40 INJECTION, SOLUTION SUBCUTANEOUS
Qty: 0 | Refills: 0 | DISCHARGE
Start: 2019-12-24

## 2019-12-24 RX ORDER — INSULIN LISPRO 100/ML
5 VIAL (ML) SUBCUTANEOUS
Refills: 0 | Status: DISCONTINUED | OUTPATIENT
Start: 2019-12-24 | End: 2019-12-24

## 2019-12-24 RX ORDER — INSULIN ASPART 100 [IU]/ML
35 INJECTION, SOLUTION SUBCUTANEOUS
Qty: 0 | Refills: 0 | DISCHARGE

## 2019-12-24 RX ORDER — CHOLECALCIFEROL (VITAMIN D3) 125 MCG
2000 CAPSULE ORAL
Qty: 0 | Refills: 0 | DISCHARGE
Start: 2019-12-24

## 2019-12-24 RX ORDER — POLYETHYLENE GLYCOL 3350 17 G/17G
17 POWDER, FOR SOLUTION ORAL
Qty: 1 | Refills: 0
Start: 2019-12-24 | End: 2020-01-22

## 2019-12-24 RX ORDER — LABETALOL HCL 100 MG
2 TABLET ORAL
Qty: 0 | Refills: 0 | DISCHARGE

## 2019-12-24 RX ORDER — INSULIN LISPRO 100/ML
25 VIAL (ML) SUBCUTANEOUS
Qty: 0 | Refills: 0 | DISCHARGE
Start: 2019-12-24 | End: 2020-01-22

## 2019-12-24 RX ADMIN — LORATADINE 10 MILLIGRAM(S): 10 TABLET ORAL at 11:55

## 2019-12-24 RX ADMIN — Medication 1 TABLET(S): at 11:55

## 2019-12-24 RX ADMIN — Medication 8 UNIT(S): at 08:22

## 2019-12-24 RX ADMIN — Medication 12.5 MILLIGRAM(S): at 06:04

## 2019-12-24 RX ADMIN — Medication 60 MILLIGRAM(S): at 13:04

## 2019-12-24 RX ADMIN — Medication 200 MILLIGRAM(S): at 09:02

## 2019-12-24 RX ADMIN — Medication 60 MILLIGRAM(S): at 06:04

## 2019-12-24 RX ADMIN — Medication 1 SPRAY(S): at 17:15

## 2019-12-24 RX ADMIN — Medication 2000 UNIT(S): at 11:55

## 2019-12-24 RX ADMIN — Medication 8 UNIT(S): at 12:08

## 2019-12-24 RX ADMIN — ENOXAPARIN SODIUM 90 MILLIGRAM(S): 100 INJECTION SUBCUTANEOUS at 06:06

## 2019-12-24 RX ADMIN — APIXABAN 5 MILLIGRAM(S): 2.5 TABLET, FILM COATED ORAL at 17:18

## 2019-12-24 RX ADMIN — Medication 200 MILLIGRAM(S): at 17:15

## 2019-12-24 RX ADMIN — Medication 1 SPRAY(S): at 06:06

## 2019-12-24 RX ADMIN — LOSARTAN POTASSIUM 100 MILLIGRAM(S): 100 TABLET, FILM COATED ORAL at 06:04

## 2019-12-24 NOTE — PROGRESS NOTE ADULT - SUBJECTIVE AND OBJECTIVE BOX
[facility] - PROGRESS NOTE    CHIEF COMPLAINT: sinusitis; bronchospasm; rhinorrhea; nasal congestion; post nasal drip; AF with RVR; s/p fall    INTERVAL HISTORY: converted from atrial fibrillation into NSR in transit to ECHO lab for KATHLEEN/DCCV - remains in NSR; remains confused but with improved mental status; no chest pain/pressure or palpitations; much less rhinorrhea, nasal congestion and post-nasal drip; no shortness of breath room air; no cough or sputum production, chest congestion or wheeze; no fevers, chills or sweats;    REVIEW OF SYSTEMS:  Constitutional: As per interval history  HEENT: As per interval history  CV: As per interval history  Resp: As per interval history  GI: Within normal limits   : Within normal limits  Musculoskeletal: Within normal limits  Skin: Within normal limits  Neurological: Within normal limits  Psychiatric: confusion -> improved  Endocrine: Within normal limits  Hematologic/Lymphatic: Within normal limits  Allergic/Immunologic: Within normal limits    MEDICATIONS:     Pulmonary "  ipratropium  for Nebulization. 500 MICROGram(s) Nebulizer once  loratadine 10 milliGRAM(s) Oral daily    Anti-microbials:    Cardiovascular:  diltiazem    Tablet 60 milliGRAM(s) Oral every 8 hours  hydrochlorothiazide 12.5 milliGRAM(s) Oral daily  labetalol 200 milliGRAM(s) Oral two times a day  losartan 100 milliGRAM(s) Oral daily  tamsulosin 0.4 milliGRAM(s) Oral at bedtime    Other:  acetaminophen  IVPB .. 1000 milliGRAM(s) IV Intermittent once  cholecalciferol 2000 Unit(s) Oral daily  dextrose 5% + sodium chloride 0.45%. 1000 milliLiter(s) IV Continuous <Continuous>  dextrose 5%. 1000 milliLiter(s) IV Continuous <Continuous>  dextrose 50% Injectable 12.5 Gram(s) IV Push once  dextrose 50% Injectable 25 Gram(s) IV Push once  dextrose 50% Injectable 25 Gram(s) IV Push once  enoxaparin Injectable 90 milliGRAM(s) SubCutaneous once  enoxaparin Injectable 90 milliGRAM(s) SubCutaneous every 12 hours  fluticasone propionate 50 MICROgram(s)/spray Nasal Spray 1 Spray(s) Both Nostrils two times a day  insulin glargine Injectable (LANTUS) 40 Unit(s) SubCutaneous at bedtime  insulin lispro (HumaLOG) corrective regimen sliding scale   SubCutaneous three times a day before meals  insulin lispro (HumaLOG) corrective regimen sliding scale   SubCutaneous at bedtime  insulin lispro Injectable (HumaLOG) 8 Unit(s) SubCutaneous three times a day before meals  multivitamin/minerals 1 Tablet(s) Oral daily  polyethylene glycol 3350 17 Gram(s) Oral daily  potassium chloride   Solution 20 milliEquivalent(s) Oral once  senna 2 Tablet(s) Oral at bedtime    MEDICATIONS  (PRN):  artificial tears (preservative free) Ophthalmic Solution 1 Drop(s) Both EYES four times a day PRN Dry Eyes  dextrose 40% Gel 15 Gram(s) Oral once PRN Blood Glucose LESS THAN 70 milliGRAM(s)/deciliter  glucagon  Injectable 1 milliGRAM(s) IntraMuscular once PRN Glucose LESS THAN 70 milligrams/deciliter        OBJECTIVE:    I&O's Detail    23 Dec 2019 07:  -  24 Dec 2019 07:00  --------------------------------------------------------  IN:    Oral Fluid: 240 mL  Total IN: 240 mL    OUT:  Total OUT: 0 mL    Total NET: 240 mL      24 Dec 2019 07:  -  24 Dec 2019 11:01  --------------------------------------------------------  IN:    Oral Fluid: 240 mL  Total IN: 240 mL    OUT:  Total OUT: 0 mL    Total NET: 240 mL    POCT Blood Glucose.: 128 mg/dL (24 Dec 2019 07:59)  POCT Blood Glucose.: 190 mg/dL (23 Dec 2019 21:20)  POCT Blood Glucose.: 152 mg/dL (23 Dec 2019 16:56)  POCT Blood Glucose.: 170 mg/dL (23 Dec 2019 12:03)      PHYSICAL EXAM:       ICU Vital Signs Last 24 Hrs  T(C): 36.5 (24 Dec 2019 04:09), Max: 36.8 (23 Dec 2019 20:55)  T(F): 97.7 (24 Dec 2019 04:09), Max: 98.3 (23 Dec 2019 20:55)  HR: 85 (24 Dec 2019 09:01) (69 - 108)  BP: 111/62 (24 Dec 2019 09:01) (92/54 - 118/70)  BP(mean): --  ABP: --  ABP(mean): --  RR: 18 (24 Dec 2019 04:09) (18 - 18)  SpO2: 96% (24 Dec 2019 04:09) (96% - 98%) on room air     General: Awake. Alert. Cooperative. Poor memory. No distress. Appears stated age. 	  HEENT: Atraumatic. Normocephalic. Anicteric. Normal oral mucosa. PERRL. EOMI. Decreased nasal mucosal injection and erythema.  Neck: Supple. Trachea midline. Thyroid without enlargement/tenderness/nodules. No carotid bruit. No JVD. Short and wide  Cardiovascular: Regular rate and rhythm. S1 S2 normal. No murmurs, rubs or gallops.  Respiratory: Respirations unlabored. Mild wheeze. No curvature.  Abdomen: Soft. Non-tender. Non-distended. No organomegaly. No masses. Normal bowel sounds. Obese.  Extremities: Warm to touch. No clubbing or cyanosis. No pedal edema.  Pulses: 2+ peripheral pulses all extremities.	  Skin: Resolving ecchymoses around right eye and on right chin/neck. Resolving hematoma on the top of the right sided of the head.  Lymph Nodes: Cervical, supraclavicular and axillary nodes normal  Neurological: Motor and sensory examination equal and normal. A and O x 2  Psychiatry: Appropriate mood and affect.    LABS:                          12.8   10.70 )-----------( 221      ( 23 Dec 2019 07:45 )             39.6     CBC    WBC  10.70 <==, 10.93 <==, 10.29 <==, 11.39 <==, 8.95 <==, 10.18 <==, 11.66 <==    Hemoglobin  12.8 <<==, 12.8 <<==, 14.0 <<==, 13.9 <<==, 13.9 <<==, 14.2 <<==, 14.0 <<==    Hematocrit  39.6 <==, 39.3 <==, 43.0 <==, 43.4 <==, 41.6 <==, 43.9 <==, 42.6 <==    Platelets  221 <==, 212 <==, 231 <==, 218 <==, 243 <==, 269 <==, 237 <==      143  |  103  |  26<H>  ----------------------------<  79    12-23  3.6   |  30  |  1.32<H>      LYTES    sodium  143 <==, 144 <==, 142 <==, 142 <==, 146 <==, 142 <==, 142 <==    potassium   3.6 <==, 3.5 <==, 3.4 <==, 3.1 <==, 3.7 <==, 3.3 <==, 3.4 <==    chloride  103 <==, 105 <==, 100 <==, 104 <==, 102 <==, 104 <==, 104 <==    carbon dioxide  30 <==, 29 <==, 27 <==, 23 <==, 22 <==, 22 <==, 21 <==    =============================================================================================  RENAL FUNCTION:    Creatinine:   1.32  <<==, 1.19  <<==, 1.16  <<==, 1.29  <<==, 1.25  <<==, 1.23  <<==, 1.04  <<==    BUN:   26 <==, 26 <==, 23 <==, 28 <==, 29 <==, 27 <==, 21 <==    ============================================================================================    calcium   9.5 <==, 9.2 <==, 9.2 <==, 8.8 <==, 8.9 <==, 9.1 <==, 8.7 <==    phos   2.5 <==    mag   2.0 <==, 1.8 <==, 1.9 <==    ============================================================================================  LFTs    AST:   40 <==     ALT:  31  <==     AP:  58  <=    Bili:  0.4  <=      PT/INR - ( 15 Dec 2019 15:36 )   PT: 13.7 sec;   INR: 1.20 ratio      Venous Blood Gas:  12-18 @ 18:47  7.48/33/109/24/98  VBG Lactate: 1.8    CARDIAC MARKERS ( 15 Dec 2019 20:49 )   U/L /CKMB x     /CKMB Units x        troponin x        CARDIAC MARKERS ( 15 Dec 2019 15:36 )   U/L /CKMB x     /CKMB Units x        troponin x          Patient name: HOLLIE STEVENS  YOB: 1935   Age: 84 (M)   MR#: [id number]  Study Date: 2019  Location: [address] [place], [place] [id number]: Suzi Wills RDCS  Study quality: Technically difficult  Referring Physician: Spike Howe MD  Blood Pressure: 120/74 mmHg  Height: 175 cm  Weight: 88 kg  BSA: 2 m2  Heart Rhythm: Atrial flutter  ------------------------------------------------------------------------  PROCEDURE: Transthoracic echocardiogram with 2-D, M-Mode  and complete spectral and color flow Doppler. Verbal  consent was obtained for injection of  Ultrasonic Enhancing  Agent following a discussion of risks and benefits.  Following intravenous injection of Ultrasonic Enhancing  Agent , harmonic imaging was performed.  INDICATION: Abnormal electrocardiogram  (R94.31 )  ------------------------------------------------------------------------  Dimensions:    Normal Values:  LA:     2.7    2.0 - 4.0 cm  Ao:            2.0 - 3.8 cm  SEPTUM: 0.9    0.6 - 1.2 cm  PWT:    0.8  0.6 - 1.1 cm  LVIDd:  3.3    3.0 - 5.6 cm  LVIDs:  2.4    1.8 - 4.0 cm  Derived variables:  LVMI: 40 g/m2  RWT: 0.47  EF (Visual Estimate): 65 %  Doppler Peak Velocity (m/sec): MV=1.7 AoV=1.3  ------------------------------------------------------------------------  Observations:  Mitral Valve: Mitral annular calcification. Calcified  mitral leaflets.  Mean mitral gradient 2.0 mmHg at 89/min.  Aortic Valve/Aorta: Calcified aortic valve with normal  opening.  Mild aortic regurgitation.  Normal aortic root.  Left Atrium: Mild left atrial enlargement.  Left Ventricle: Endocardial visualization enhanced with  intravenous injection of Ultrasonic Enhancing Agent  (Definity).  Normal left ventricular internal dimensions and wall  thicknesses.  Normal left ventricular systolic function. No segmental  wall motion abnormalities.  Right Heart: Normal right atrium. Normal right ventricular  size and systolic function. Normal tricuspid valve. Minimal  tricuspid regurgitation. Normal pulmonic valve.  Pericardium/Pleura: Normal pericardium with no pericardial  effusion.  Hemodynamic: No evidence of pulmonary hypertension.  No PFO seen with color Doppler.  ------------------------------------------------------------------------  Conclusions:  Endocardial visualization enhanced with intravenous  injection of Ultrasonic Enhancing Agent (Definity).  Normal left ventricular systolic function. No segmental  wall motion abnormalities.  ------------------------------------------------------------------------  Confirmed on  2019 - 14:28:17 by Chriss Rowland M.D.  ------------------------------------------------------------------------  ---------------------------------------------------------------------------------------------------------------      MICROBIOLOGY:     Rapid Respiratory Viral Panel (12.16.19 @ 10:27)    Rapid RVP Result: NotDetec: This Respiratory Panel uses polymerase chain reaction (PCR) to detect for  adenovirus; coronavirus (HKU1, NL63, 229E, OC43); human metapneumovirus  (hMPV); human enterovirus/rhinovirus (Entero/RV); influenza A; influenza  A/H1; influenza A/H3; influenza A/H1-2009; influenza B; parainfluenza  viruses 1, 2, 3, 4; respiratory syncytial virus; Mycoplasma pneumoniae;  and Chlamydophila pneumoniae.    Urinalysis Basic - ( 15 Dec 2019 18:05 )    Color: Yellow / Appearance: Clear / S.029 / pH: x  Gluc: x / Ketone: Small  / Bili: Negative / Urobili: Negative   Blood: x / Protein: 30 mg/dL / Nitrite: Negative   Leuk Esterase: Negative / RBC: 4 /hpf / WBC 3 /HPF   Sq Epi: x / Non Sq Epi: 2 /hpf / Bacteria: Negative    Culture - Urine (12.15.19 @ 22:11)    Specimen Source: .Urine Clean Catch (Midstream)    Culture Results:   Aerococcus species  "Aerococcus spp. are predictably susceptible to penicillin,  ampicillin, tetracycline, and vancomycin.  All isolates are  resistant to sulfonamides"  <10,000 CFU/ml Normal Urogenital pema present    RADIOLOGY:  [x] Chest radiographs reviewed and interpreted by me    EXAM:  XR CHEST AP OR PA 1V                          PROCEDURE DATE:  12/15/2019      INTERPRETATION:  CLINICAL INFORMATION: Cough and fever.    EXAM: AP chest radiograph    COMPARISON: None    FINDINGS:  The heart is normal in size. Calcified aortic knob. No focal   consolidations. No pleural effusion or pneumothorax.  The visualized osseous structures demonstrate no acute pathology.    IMPRESSION:  Clear lungs    KATHY SNOW M.D., RADIOLOGY RESIDENT  This document has been electronically signed.  CAITIE MEJIAS M.D., ATTENDING RADIOLOGIST  This document has been electronically signed. Dec 16 2019  9:38AM  ---------------------------------------------------------------------------------------------------------------    EXAM:  CT ABDOMEN AND PELVIS IC                          EXAM:  CT CHEST IC                          PROCEDURE DATE:  12/15/2019      INTERPRETATION:  CLINICAL INFORMATION: Trauma. Status post fall with   diffuse abdominal tenderness.    COMPARISON: None.    PROCEDURE:   CT of the Chest, Abdomen and Pelvis was performed with intravenous   contrast.   Imaging was performed through the chest in the arterial phase followed by   imaging of the abdomen and pelvis in the portal venous phase.  Intravenous contrast: 90 ml Omnipaque 350. 10 ml discarded.  Oral contrast: None.  Sagittal and coronal reformats were performed.    FINDINGS:    CHEST:     LUNGS AND LARGE AIRWAYS: Patent central airways. No consolidation.  PLEURA: No pleural effusion. No pneumothorax.  VESSELS: Atherosclerotic changes of the aorta and coronary arteries.  HEART: Heart size is normal. No pericardial effusion. Mitral annulus and   aortic valve calcifications.  MEDIASTINUM AND KARLA: No lymphadenopathy.  CHEST WALL AND LOWER NECK: Within normal limits.    ABDOMEN AND PELVIS:    LIVER: Steatosis.  BILE DUCTS: Normal caliber.  GALLBLADDER: Within normal limits.  SPLEEN: Within normal limits.  PANCREAS: Within normal limits.  ADRENALS: 7 mm indeterminate left adrenal nodule.. Right adrenal gland is   unremarkable..  KIDNEYS/URETERS: No renal stones or hydronephrosis. Right upper pole   exophytic complex cystic lesion with irregular thick mural thickening..   Bilateral renal subcentimeter hypodensities which are too small to   characterize. Punctate 2 mm nonobstructing calculus in the midpole of the   left kidney.    BLADDER: 5 cm left and 1 cm right posterior bladder diverticuli.    REPRODUCTIVE ORGANS: Prostate within normal limits.    BOWEL: No bowel obstruction. Appendix is normal.  PERITONEUM: Trace fluid in the right paracolic gutter. No   pneumoperitoneum.  VESSELS: Atherosclerotic changes.  RETROPERITONEUM/LYMPH NODES: No lymphadenopathy.    ABDOMINAL WALL: Small degree of ventral abdominal wall subcutaneous   stranding and skin thickening, left greater the right, likely related to   traumatic contusion. No discrete hematoma. Small bilateral fat-containing   inguinal hernias..  BONES: No acute fracture or dislocation. Degenerative changes.    IMPRESSION:   Trace amount of free fluid in the right paracolic gutter.    No visceral injury is identified.    Ventral abdominal wall subcutaneous stranding and skin thickening, left   greater the right, likely related to traumatic contusion. No discrete   hematoma.     Complex exophytic right renal cystic lesion and indeterminate left   adrenal nodule. Further characterization with nonemergent contrast   enhanced MRI is recommended.    ROULA BACH M.D., RADIOLOGY RESIDENT  This document has been electronically signed.  MARK GUNDERSON M.D., ATTENDING RADIOLOGIST  This document has been electronically signed. Dec 15 2019  6:35PM  ---------------------------------------------------------------------------------------------------------------    EXAM:  CT BRAIN                          PROCEDURE DATE:  2019      INTERPRETATION:  .    CLINICAL INFORMATION: Change in mental status. Status post fall.    TECHNIQUE: Multiple axial CT images of the head were obtained without contrast. Sagittal and coronal reconstructed images were acquired from the source data.    COMPARISON: Prior head CT study from 12/15/2019.    FINDINGS: There is no acute intracranial hemorrhage, mass effect, shift of the midline structures, herniation, extra-axial fluid collection, or hydrocephalus.    There is diffuse cerebral volume loss with prominence of the sulci, fissures, and cisternal spaces which is normal for the patient's age. 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    The paranasal sinuses and mastoid air cells are clear. The calvarium is intact. A mild amount of right frontal scalp soft tissue swelling is again appreciated. There is evidence of bilateral cataract removal.    IMPRESSION: No acute intracranial hemorrhage, mass effect, or shift of the midline structures.    JAY OLSEN M.D., ATTENDING RADIOLOGIST  This document has been electronically signed. Dec 19 2019  8:18AM  ---------------------------------------------------------------------------------------------------------------

## 2019-12-24 NOTE — PROGRESS NOTE ADULT - SUBJECTIVE AND OBJECTIVE BOX
HPI:  Patient seen and examined at bedside on 4 [place].  Patient is maintaining NSR.  Discharge planning for discharge to rehab today.    Review Of Systems:           Respiratory: No shortness of breath, cough, or wheezing  Cardiovascular: No chest pain or palpitations  10 point review of systems is otherwise negative except as mentioned above        Medications:  acetaminophen  IVPB .. 1000 milliGRAM(s) IV Intermittent once  apixaban 5 milliGRAM(s) Oral two times a day  artificial tears (preservative free) Ophthalmic Solution 1 Drop(s) Both EYES four times a day PRN  cholecalciferol 2000 Unit(s) Oral daily  dextrose 40% Gel 15 Gram(s) Oral once PRN  dextrose 5% + sodium chloride 0.45%. 1000 milliLiter(s) IV Continuous <Continuous>  dextrose 5%. 1000 milliLiter(s) IV Continuous <Continuous>  dextrose 50% Injectable 12.5 Gram(s) IV Push once  dextrose 50% Injectable 25 Gram(s) IV Push once  dextrose 50% Injectable 25 Gram(s) IV Push once  diltiazem    Tablet 60 milliGRAM(s) Oral every 8 hours  fluticasone propionate 50 MICROgram(s)/spray Nasal Spray 1 Spray(s) Both Nostrils two times a day  glucagon  Injectable 1 milliGRAM(s) IntraMuscular once PRN  hydrochlorothiazide 12.5 milliGRAM(s) Oral daily  insulin glargine Injectable (LANTUS) 40 Unit(s) SubCutaneous at bedtime  insulin lispro (HumaLOG) corrective regimen sliding scale   SubCutaneous three times a day before meals  insulin lispro (HumaLOG) corrective regimen sliding scale   SubCutaneous at bedtime  insulin lispro Injectable (HumaLOG) 5 Unit(s) SubCutaneous three times a day before meals  ipratropium  for Nebulization. 500 MICROGram(s) Nebulizer once  loratadine 10 milliGRAM(s) Oral daily  losartan 100 milliGRAM(s) Oral daily  multivitamin/minerals 1 Tablet(s) Oral daily  polyethylene glycol 3350 17 Gram(s) Oral daily  potassium chloride   Solution 20 milliEquivalent(s) Oral once  senna 2 Tablet(s) Oral at bedtime  tamsulosin 0.4 milliGRAM(s) Oral at bedtime    PAST MEDICAL & SURGICAL HISTORY:  Diabetes mellitus type 2 in nonobese  BPH (benign prostatic hyperplasia)  Hyperlipidemia  HTN (hypertension)  H/O arthroscopy: R knee    Vitals:  T(C): 36.4 (12-24-19 @ 13:02), Max: 36.5 (12-24-19 @ 04:09)  HR: 63 (12-24-19 @ 17:14) (63 - 85)  BP: 106/65 (12-24-19 @ 17:14) (104/65 - 111/62)  BP(mean): --  RR: 18 (12-24-19 @ 13:02) (18 - 18)  SpO2: 97% (12-24-19 @ 13:02) (96% - 97%)  Wt(kg): --  Daily     Daily   I&O's Summary    23 Dec 2019 07:01  -  24 Dec 2019 07:00  --------------------------------------------------------  IN: 240 mL / OUT: 0 mL / NET: 240 mL    24 Dec 2019 07:01  -  24 Dec 2019 23:14  --------------------------------------------------------  IN: 240 mL / OUT: 0 mL / NET: 240 mL        Physical Exam:  Appearance: Normal, well groomed, NAD  Eyes: PERRLA, EOMI, pink conjunctiva, no scleral icterus   HENT: Normal oral mucosa; old ecchymoses  Cardiovascular: RRR S1, S2, no murmur, rub, or gallop; no edema; no JVD  Respiratory: Clear to auscultation bilaterally  Gastrointestinal: Soft, non-tender, non-distended, BS+  Musculoskeletal: No clubbing or joint deformity   Neurologic: No focal weakness  Lymphatic: No lymphadenopathy  Psychiatry: AAOx2 with appropriate mood and affect  Skin: No rashes, ecchymoses, or cyanosis                          12.8   10.70 )-----------( 221      ( 23 Dec 2019 07:45 )             39.6     12-23    143  |  103  |  26<H>  ----------------------------<  79  3.6   |  30  |  1.32<H>    Ca    9.5      23 Dec 2019 06:14      Interpretation of Telemetry: NSR

## 2019-12-24 NOTE — PROGRESS NOTE ADULT - ASSESSMENT
84 year-old gentleman with multiple cardiovascular risk factors as above presents with falls and weakness of unclear etiology.  Airways sound tight, but patient is not grossly volume overloaded.  No evidence of recent ACS or decompensated heart failure.  New onset atrial fibrillation is likely unrelated to his fall.  Spontaneously cardioverted.  Continue anticoagulation with plans to discharge on Eliquis 5 mg BID for patient with age > 80, weight > 60 kg, and creatinine < 1.5 mg/dL.

## 2019-12-24 NOTE — PROGRESS NOTE ADULT - PROBLEM SELECTOR PLAN 1
Will continue current insulin regimen for now. Will continue monitoring FS, log, and FU.  Patient was on high-dose insulin at home; Suggest to DC to rehab on current inpatient insulin regimen and FU endo 4 weeks. Discussed plan with patient as well as primary team.  Patient counseled for compliance with consistent low carb diet and exercise as tolerated outpatient. Will continue current insulin regimen for now. Will continue monitoring FS, log, and FU.

## 2019-12-24 NOTE — PROVIDER CONTACT NOTE (OTHER) - ACTION/TREATMENT ORDERED:
awaiting orders
administer additional duoneb treatment
will continue to monitor, FS 111after dinner
NP Charanjit aware. Ordered to hold Insulin & assist with feeds to prevent aspiration. Perform BG check as per policy till BG above 100mg/dl. Continue to monitor.
NP made aware. D5 1/2 NS @ 75 cc/hr started. will continue to monitor.
PA made aware. 12.5 gram of 50 % dextrose IVP given. will continue to monitor.
PA made aware. will recheck B/P again. rescheduled Cardizem and held labetalol. will continue to monitor.

## 2019-12-24 NOTE — CHART NOTE - NSCHARTNOTEFT_GEN_A_CORE
Called by RN to report pt with hypoglycemia. Pt seen at bedside, mentating at baseline. Pt with FS of 65 before dinner after dinner, repeat . Discussed with Endocrinology (Dr. Cole). Humalog decreased to 5 units sq before meals.     Shemar Chacon  [facility] [id number]

## 2019-12-24 NOTE — PROGRESS NOTE ADULT - ATTENDING COMMENTS
Paroxysmal AF on Dilt and labetalol.  To DC on Biotel.
Patient was on high-dose insulin at home; Suggest to DC to rehab on current inpatient insulin regimen and FU endo 4 weeks. Discussed plan with patient as well as primary team.  Patient counseled for compliance with consistent low carb diet and exercise as tolerated outpatient.
Will continue current insulin regimen for now. Will continue monitoring FS, log, and FU.
Will decrease Lantus to 40u at bedtime.  Will decrease Humalog to 8u before each meal and continue coverage scale. Will continue monitoring FS, log, will Follow up.
Will increase Lantus to 62u at bedtime, increase Humalog to 14u before each meal, and continue coverage scale. Will continue monitoring FS, log, and FU.
dc planning rehab

## 2019-12-24 NOTE — PROGRESS NOTE ADULT - SUBJECTIVE AND OBJECTIVE BOX
HOLLIE STEVENS  84y Male  MRN:[id number]    Patient is a 84y old  Male who presents with a chief complaint of s/p fall (15 Dec 2019 22:05)    HPI:  83 yo male PMhx DM on insulin, HTN, BPH, HLD presents to the ED with unwitnessed fall the day prior with inability to get up and ambulate. Pt reports he slipped off the bed the day prior onto floor and was unable to get up.  Wife attempted to help him up but was unable, so placed pillow under head and pt slept on floor overnight. This AM pt was still unable to get up so EMS was called. Patient states he uses a cane to ambulate outside the house, but ambulate independently.  Patient had a trip and fall outside the house in [place], resulting in bruising and hematoma R chin, abd, and R frontal scalp, evaluated here in ED, and discharged home.  Patient has been feeling congested for the past couple days, decrease PO intake, and GRANT.  Patient denies fever (but on Ibuprofen 800 3x daily for back and leg pain), chest pain, cough, LOC, dysuria or abd pain.  No sick contact.  c/o gen weakness and inability to ambulate (15 Dec 2019 22:05)      Patient seen and evaluated at bedside. No acute events overnight except as noted.    Interval HPI: no events o/n.    Tele: nsr    PAST MEDICAL & SURGICAL HISTORY:  Diabetes mellitus type 2 in nonobese  BPH (benign prostatic hyperplasia)  Hyperlipidemia  HTN (hypertension)  H/O arthroscopy: R knee      REVIEW OF SYSTEMS:  as per hpi       VITALS:  Vital Signs Last 24 Hrs  T(C): 36.4 (24 Dec 2019 13:02), Max: 36.8 (23 Dec 2019 20:55)  T(F): 97.6 (24 Dec 2019 13:02), Max: 98.3 (23 Dec 2019 20:55)  HR: 70 (24 Dec 2019 13:02) (69 - 108)  BP: 104/65 (24 Dec 2019 13:02) (96/56 - 118/70)  BP(mean): --  RR: 18 (24 Dec 2019 13:02) (18 - 18)  SpO2: 97% (24 Dec 2019 13:02) (96% - 98%)    PHYSICAL EXAM:  GENERAL: NAD, well-developed  HEAD:  +head trauma, +ecchymosis on face/neck  EYES: EOMI, PERRLA, conjunctiva and sclera clear  NECK: Supple, No JVD  CHEST/LUNG: b/l congestion/wheeze   HEART: S1, S2; irreg irreg. No murmurs, rubs, or gallops  ABDOMEN: Soft, Nontender, Nondistended; Bowel sounds present  EXTREMITIES:  2+ Peripheral Pulses, No clubbing, cyanosis, or edema  PSYCH: Normal affect  NEUROLOGY: AAOX3; non-focal  SKIN: No rashes or lesions    Consultant(s) Notes Reviewed:  [x ] YES  [ ] NO  Care Discussed with Consultants/Other Providers [ x] YES  [ ] NO    MEDS:  MEDICATIONS  (STANDING):  acetaminophen  IVPB .. 1000 milliGRAM(s) IV Intermittent once  apixaban 5 milliGRAM(s) Oral two times a day  cholecalciferol 2000 Unit(s) Oral daily  dextrose 5% + sodium chloride 0.45%. 1000 milliLiter(s) (75 mL/Hr) IV Continuous <Continuous>  dextrose 5%. 1000 milliLiter(s) (50 mL/Hr) IV Continuous <Continuous>  dextrose 50% Injectable 12.5 Gram(s) IV Push once  dextrose 50% Injectable 25 Gram(s) IV Push once  dextrose 50% Injectable 25 Gram(s) IV Push once  diltiazem    Tablet 60 milliGRAM(s) Oral every 8 hours  fluticasone propionate 50 MICROgram(s)/spray Nasal Spray 1 Spray(s) Both Nostrils two times a day  hydrochlorothiazide 12.5 milliGRAM(s) Oral daily  insulin glargine Injectable (LANTUS) 40 Unit(s) SubCutaneous at bedtime  insulin lispro (HumaLOG) corrective regimen sliding scale   SubCutaneous three times a day before meals  insulin lispro (HumaLOG) corrective regimen sliding scale   SubCutaneous at bedtime  insulin lispro Injectable (HumaLOG) 8 Unit(s) SubCutaneous three times a day before meals  ipratropium  for Nebulization. 500 MICROGram(s) Nebulizer once  labetalol 200 milliGRAM(s) Oral two times a day  loratadine 10 milliGRAM(s) Oral daily  losartan 100 milliGRAM(s) Oral daily  multivitamin/minerals 1 Tablet(s) Oral daily  polyethylene glycol 3350 17 Gram(s) Oral daily  potassium chloride   Solution 20 milliEquivalent(s) Oral once  senna 2 Tablet(s) Oral at bedtime  tamsulosin 0.4 milliGRAM(s) Oral at bedtime    MEDICATIONS  (PRN):  artificial tears (preservative free) Ophthalmic Solution 1 Drop(s) Both EYES four times a day PRN Dry Eyes  dextrose 40% Gel 15 Gram(s) Oral once PRN Blood Glucose LESS THAN 70 milliGRAM(s)/deciliter  glucagon  Injectable 1 milliGRAM(s) IntraMuscular once PRN Glucose LESS THAN 70 milligrams/deciliter        ALLERGIES:  No Known Allergies      LABS:                                                       12.8   10.70 )-----------( 221      ( 23 Dec 2019 07:45 )             39.6   12-23    143  |  103  |  26<H>  ----------------------------<  79  3.6   |  30  |  1.32<H>    Ca    9.5      23 Dec 2019 06:14                < from: Xray Chest 1 View- PORTABLE-Urgent (12.16.19 @ 12:45) >  IMPRESSION:    Clear lungs.    < end of copied text >         < from: CT Thoracic Spine Reform No Cont (12.15.19 @ 17:20) >    IMPRESSION:   CT BRAIN: No acute intracranial bleeding, mass effect, or shift.   Resolving right frontal scalp hematoma, now small compared to prior CT   head from 11/28/2019.     CT CERVICAL SPINE: No acute fracture subluxation. Multilevel   spondylosis..     CT THORACIC SPINE: No acute fracture subluxation. Multilevel spondylosis.   Increased density along the left posterior lateral aspect of the central   canal at the T6-7 level possibly representing a meningioma. Follow-up   thoracic spine MRI is recommended.  Partially imaged abdomen demonstrates right renal hypodensity and nodular   thickening of the bilateral adrenal glands better evaluated on same day   CT abdomen pelvis.     CT LUMBAR SPINE: No acute fracture subluxation. Multilevel spondylosis.    < end of copied text >

## 2019-12-24 NOTE — PROGRESS NOTE ADULT - REASON FOR ADMISSION
s/p fall

## 2019-12-24 NOTE — PROGRESS NOTE ADULT - PROBLEM SELECTOR PLAN 4
uncontrolled   endo consulted
- will hold all oral agents  - will monitor finger sticks and cover with low dose ISS  - will decrease basal insulin to half with decrease PO intake
- will hold all oral agents  - will monitor finger sticks and cover with low dose ISS  - will decrease basal insulin to half with decrease PO intake
Suggest to continue medications, monitoring, FU primary team recommendations.
uncontrolled   endo consulted

## 2019-12-24 NOTE — PROGRESS NOTE ADULT - PROBLEM SELECTOR PROBLEM 5
Essential hypertension
Hyperlipidemia

## 2019-12-24 NOTE — PROGRESS NOTE ADULT - PROBLEM SELECTOR PROBLEM 8
ACP (advance care planning)

## 2019-12-24 NOTE — PROGRESS NOTE ADULT - PROBLEM SELECTOR PLAN 8
Advanced care planning was discussed with patient and family.  Advanced care planning forms were reviewed and discussed.  Differential diagnosis and plan of care discussed with patient after the evaluation.   Pain assessed and judicious use of narcotics when appropriate was discussed.  Importance of Fall prevention discussed.  Counseling on Smoking and Alcohol cessation was offered when appropriate.  Counseling on Diet, exercise, and medication compliance was done.

## 2019-12-24 NOTE — PROGRESS NOTE ADULT - SUBJECTIVE AND OBJECTIVE BOX
24H hour events:     MEDICATIONS:  diltiazem    Tablet 60 milliGRAM(s) Oral every 8 hours  enoxaparin Injectable 90 milliGRAM(s) SubCutaneous once  enoxaparin Injectable 90 milliGRAM(s) SubCutaneous every 12 hours  hydrochlorothiazide 12.5 milliGRAM(s) Oral daily  labetalol 200 milliGRAM(s) Oral two times a day  losartan 100 milliGRAM(s) Oral daily  tamsulosin 0.4 milliGRAM(s) Oral at bedtime      ipratropium  for Nebulization. 500 MICROGram(s) Nebulizer once  loratadine 10 milliGRAM(s) Oral daily    acetaminophen  IVPB .. 1000 milliGRAM(s) IV Intermittent once    polyethylene glycol 3350 17 Gram(s) Oral daily  senna 2 Tablet(s) Oral at bedtime    dextrose 40% Gel 15 Gram(s) Oral once PRN  dextrose 50% Injectable 12.5 Gram(s) IV Push once  dextrose 50% Injectable 25 Gram(s) IV Push once  dextrose 50% Injectable 25 Gram(s) IV Push once  glucagon  Injectable 1 milliGRAM(s) IntraMuscular once PRN  insulin glargine Injectable (LANTUS) 40 Unit(s) SubCutaneous at bedtime  insulin lispro (HumaLOG) corrective regimen sliding scale   SubCutaneous three times a day before meals  insulin lispro (HumaLOG) corrective regimen sliding scale   SubCutaneous at bedtime  insulin lispro Injectable (HumaLOG) 8 Unit(s) SubCutaneous three times a day before meals    artificial tears (preservative free) Ophthalmic Solution 1 Drop(s) Both EYES four times a day PRN  cholecalciferol 2000 Unit(s) Oral daily  dextrose 5% + sodium chloride 0.45%. 1000 milliLiter(s) IV Continuous <Continuous>  dextrose 5%. 1000 milliLiter(s) IV Continuous <Continuous>  fluticasone propionate 50 MICROgram(s)/spray Nasal Spray 1 Spray(s) Both Nostrils two times a day  multivitamin/minerals 1 Tablet(s) Oral daily  potassium chloride   Solution 20 milliEquivalent(s) Oral once      REVIEW OF SYSTEMS:  Complete 10point ROS negative.    PHYSICAL EXAM:  T(C): 36.5 (12-24-19 @ 04:09), Max: 36.8 (12-23-19 @ 20:55)  HR: 85 (12-24-19 @ 09:01) (69 - 108)  BP: 111/62 (12-24-19 @ 09:01) (92/54 - 118/70)  RR: 18 (12-24-19 @ 04:09) (18 - 18)  SpO2: 96% (12-24-19 @ 04:09) (96% - 98%)  Wt(kg): --  I&O's Summary    23 Dec 2019 07:01  -  24 Dec 2019 07:00  --------------------------------------------------------  IN: 240 mL / OUT: 0 mL / NET: 240 mL        Appearance: Normal	  HEENT:   Normal oral mucosa, PERRL, EOMI	  Lymphatic: No lymphadenopathy  Cardiovascular: Normal S1 S2, No JVD, No murmurs, No edema  Respiratory: Lungs clear to auscultation	  Psychiatry: A & O x 3, Mood & affect appropriate  Gastrointestinal:  Soft, Non-tender, + BS	  Skin: No rashes, No ecchymoses, No cyanosis	  Neurologic: Non-focal  Extremities: Normal range of motion, No clubbing, cyanosis or edema  Vascular: Peripheral pulses palpable 2+ bilaterally        LABS:	 	    CBC Full  -  ( 23 Dec 2019 07:45 )  WBC Count : 10.70 K/uL  Hemoglobin : 12.8 g/dL  Hematocrit : 39.6 %  Platelet Count - Automated : 221 K/uL  Mean Cell Volume : 93.6 fl  Mean Cell Hemoglobin : 30.3 pg  Mean Cell Hemoglobin Concentration : 32.3 gm/dL  Auto Neutrophil # : x  Auto Lymphocyte # : x  Auto Monocyte # : x  Auto Eosinophil # : x  Auto Basophil # : x  Auto Neutrophil % : x  Auto Lymphocyte % : x  Auto Monocyte % : x  Auto Eosinophil % : x  Auto Basophil % : x    12-23    143  |  103  |  26<H>  ----------------------------<  79  3.6   |  30  |  1.32<H>    Ca    9.5      23 Dec 2019 06:14        proBNP:   Lipid Profile:   HgA1c:   TSH:       CARDIAC MARKERS:          TELEMETRY: 	    ECG:  	  RADIOLOGY:  OTHER: 	    PREVIOUS DIAGNOSTIC TESTING:    [ ] Echocardiogram:    [ ]  Catheterization:  [ ] Stress Test:  	  	  ASSESSMENT/PLAN: 24H hour events:   Seen in bed, about to ear breakfast, denies any complaints at this time and "feels good"; no events overnight    MEDICATIONS:  diltiazem   Tablet 60 milliGRAM(s) Oral every 8 hours  enoxaparin Injectable 90 milliGRAM(s) SubCutaneous once  enoxaparin Injectable 90 milliGRAM(s) SubCutaneous every 12 hours  hydrochlorothiazide 12.5 milliGRAM(s) Oral daily  labetalol 200 milliGRAM(s) Oral two times a day  losartan 100 milliGRAM(s) Oral daily  tamsulosin 0.4 milliGRAM(s) Oral at bedtime  ipratropium  for Nebulization. 500 MICROGram(s) Nebulizer once  loratadine 10 milliGRAM(s) Oral daily  acetaminophen  IVPB .. 1000 milliGRAM(s) IV Intermittent once  polyethylene glycol 3350 17 Gram(s) Oral daily  senna 2 Tablet(s) Oral at bedtime  dextrose 40% Gel 15 Gram(s) Oral once PRN  dextrose 50% Injectable 12.5 Gram(s) IV Push once  dextrose 50% Injectable 25 Gram(s) IV Push once  dextrose 50% Injectable 25 Gram(s) IV Push once  glucagon  Injectable 1 milliGRAM(s) IntraMuscular once PRN  insulin glargine Injectable (LANTUS) 40 Unit(s) SubCutaneous at bedtime  insulin lispro (HumaLOG) corrective regimen sliding scale   SubCutaneous three times a day before meals  insulin lispro (HumaLOG) corrective regimen sliding scale   SubCutaneous at bedtime  insulin lispro Injectable (HumaLOG) 8 Unit(s) SubCutaneous three times a day before meals  artificial tears (preservative free) Ophthalmic Solution 1 Drop(s) Both EYES four times a day PRN  cholecalciferol 2000 Unit(s) Oral daily  dextrose 5% + sodium chloride 0.45%. 1000 milliLiter(s) IV Continuous <Continuous>  dextrose 5%. 1000 milliLiter(s) IV Continuous <Continuous>  fluticasone propionate 50 MICROgram(s)/spray Nasal Spray 1 Spray(s) Both Nostrils two times a day  multivitamin/minerals 1 Tablet(s) Oral daily  potassium chloride   Solution 20 milliEquivalent(s) Oral once      REVIEW OF SYSTEMS:  Complete 10point ROS negative except as noted above    PHYSICAL EXAM:  T(C): 36.5 (12-24-19 @ 04:09), Max: 36.8 (12-23-19 @ 20:55)  HR: 85 (12-24-19 @ 09:01) (69 - 108)  BP: 111/62 (12-24-19 @ 09:01) (92/54 - 118/70)  RR: 18 (12-24-19 @ 04:09) (18 - 18)  SpO2: 96% (12-24-19 @ 04:09) (96% - 98%)  Wt(kg): --  I&O's Summary    23 Dec 2019 07:01  -  24 Dec 2019 07:00  --------------------------------------------------------  IN: 240 mL / OUT: 0 mL / NET: 240 mL        Appearance: Normal, in NAD	  HEENT: Normocephalic, atraumatic, normal oral mucosa  Cardiovascular: Normal S1 S2, No JVD, No murmurs, No edema  Respiratory: Lungs clear to auscultation	  Psychiatry: Alert, calm, conversant  Gastrointestinal:  Soft, Non-tender, + BS	  Genitourinary: voids spontaneously  Skin: No rashes, No cyanosis	  Extremities: Normal range of motion, No clubbing, cyanosis   Vascular: Peripheral pulses palpable 2+ bilaterally        LABS:	 	    CBC Full  -  ( 23 Dec 2019 07:45 )  WBC Count : 10.70 K/uL  Hemoglobin : 12.8 g/dL  Hematocrit : 39.6 %  Platelet Count - Automated : 221 K/uL  Mean Cell Volume : 93.6 fl  Mean Cell Hemoglobin : 30.3 pg  Mean Cell Hemoglobin Concentration : 32.3 gm/dL  Auto Neutrophil # : x  Auto Lymphocyte # : x  Auto Monocyte # : x  Auto Eosinophil # : x  Auto Basophil # : x  Auto Neutrophil % : x  Auto Lymphocyte % : x  Auto Monocyte % : x  Auto Eosinophil % : x  Auto Basophil % : x    12-23    143  |  103  |  26<H>  ----------------------------<  79  3.6   |  30  |  1.32<H>    Ca    9.5      23 Dec 2019 06:14      TELEMETRY: NSR 60-80's, no ectopy  	    ECG 12/23/19: NSR @ 85 bpm, IA 164ms, QRSd 134ms, LAD, RBBB/LAFB

## 2019-12-24 NOTE — PROGRESS NOTE ADULT - SUBJECTIVE AND OBJECTIVE BOX
Chief complaint  Patient is a 84y old  Male who presents with a chief complaint of s/p fall (24 Dec 2019 13:20)   Review of systems  Patient in bed, looks comfortable, no fever, no hypoglycemia.    Labs and Fingersticks  CAPILLARY BLOOD GLUCOSE      POCT Blood Glucose.: 132 mg/dL (24 Dec 2019 12:03)  POCT Blood Glucose.: 128 mg/dL (24 Dec 2019 07:59)  POCT Blood Glucose.: 190 mg/dL (23 Dec 2019 21:20)  POCT Blood Glucose.: 152 mg/dL (23 Dec 2019 16:56)      Anion Gap, Serum: 10 (12-23 @ 06:14)      Calcium, Total Serum: 9.5 (12-23 @ 06:14)          12-23    143  |  103  |  26<H>  ----------------------------<  79  3.6   |  30  |  1.32<H>    Ca    9.5      23 Dec 2019 06:14                          12.8   10.70 )-----------( 221      ( 23 Dec 2019 07:45 )             39.6     Medications  MEDICATIONS  (STANDING):  acetaminophen  IVPB .. 1000 milliGRAM(s) IV Intermittent once  apixaban 5 milliGRAM(s) Oral two times a day  cholecalciferol 2000 Unit(s) Oral daily  dextrose 5% + sodium chloride 0.45%. 1000 milliLiter(s) (75 mL/Hr) IV Continuous <Continuous>  dextrose 5%. 1000 milliLiter(s) (50 mL/Hr) IV Continuous <Continuous>  dextrose 50% Injectable 12.5 Gram(s) IV Push once  dextrose 50% Injectable 25 Gram(s) IV Push once  dextrose 50% Injectable 25 Gram(s) IV Push once  diltiazem    Tablet 60 milliGRAM(s) Oral every 8 hours  fluticasone propionate 50 MICROgram(s)/spray Nasal Spray 1 Spray(s) Both Nostrils two times a day  hydrochlorothiazide 12.5 milliGRAM(s) Oral daily  insulin glargine Injectable (LANTUS) 40 Unit(s) SubCutaneous at bedtime  insulin lispro (HumaLOG) corrective regimen sliding scale   SubCutaneous three times a day before meals  insulin lispro (HumaLOG) corrective regimen sliding scale   SubCutaneous at bedtime  insulin lispro Injectable (HumaLOG) 8 Unit(s) SubCutaneous three times a day before meals  ipratropium  for Nebulization. 500 MICROGram(s) Nebulizer once  labetalol 200 milliGRAM(s) Oral two times a day  loratadine 10 milliGRAM(s) Oral daily  losartan 100 milliGRAM(s) Oral daily  multivitamin/minerals 1 Tablet(s) Oral daily  polyethylene glycol 3350 17 Gram(s) Oral daily  potassium chloride   Solution 20 milliEquivalent(s) Oral once  senna 2 Tablet(s) Oral at bedtime  tamsulosin 0.4 milliGRAM(s) Oral at bedtime      Physical Exam  General: Patient comfortable in bed  Vital Signs Last 12 Hrs  T(F): 97.6 (12-24-19 @ 13:02), Max: 97.7 (12-24-19 @ 04:09)  HR: 70 (12-24-19 @ 13:02) (69 - 85)  BP: 104/65 (12-24-19 @ 13:02) (104/65 - 111/62)  BP(mean): --  RR: 18 (12-24-19 @ 13:02) (18 - 18)  SpO2: 97% (12-24-19 @ 13:02) (96% - 97%)  Neck: No palpable thyroid nodules.  CVS: S1S2, No murmurs  Respiratory: No wheezing, no crepitations  GI: Abdomen soft, bowel sounds positive  Musculoskeletal:  edema lower extremities.   Skin: No skin rashes, no ecchymosis    Diagnostics Chief complaint  Patient is a 84y old  Male who presents with a chief complaint of s/p fall (24 Dec 2019 13:20)   Review of systems  Patient in bed, looks comfortable, no fever,  no hypoglycemia.    Labs and Fingersticks  CAPILLARY BLOOD GLUCOSE      POCT Blood Glucose.: 132 mg/dL (24 Dec 2019 12:03)  POCT Blood Glucose.: 128 mg/dL (24 Dec 2019 07:59)  POCT Blood Glucose.: 190 mg/dL (23 Dec 2019 21:20)  POCT Blood Glucose.: 152 mg/dL (23 Dec 2019 16:56)      Anion Gap, Serum: 10 (12-23 @ 06:14)      Calcium, Total Serum: 9.5 (12-23 @ 06:14)          12-23    143  |  103  |  26<H>  ----------------------------<  79  3.6   |  30  |  1.32<H>    Ca    9.5      23 Dec 2019 06:14                          12.8   10.70 )-----------( 221      ( 23 Dec 2019 07:45 )             39.6     Medications  MEDICATIONS  (STANDING):  acetaminophen  IVPB .. 1000 milliGRAM(s) IV Intermittent once  apixaban 5 milliGRAM(s) Oral two times a day  cholecalciferol 2000 Unit(s) Oral daily  dextrose 5% + sodium chloride 0.45%. 1000 milliLiter(s) (75 mL/Hr) IV Continuous <Continuous>  dextrose 5%. 1000 milliLiter(s) (50 mL/Hr) IV Continuous <Continuous>  dextrose 50% Injectable 12.5 Gram(s) IV Push once  dextrose 50% Injectable 25 Gram(s) IV Push once  dextrose 50% Injectable 25 Gram(s) IV Push once  diltiazem    Tablet 60 milliGRAM(s) Oral every 8 hours  fluticasone propionate 50 MICROgram(s)/spray Nasal Spray 1 Spray(s) Both Nostrils two times a day  hydrochlorothiazide 12.5 milliGRAM(s) Oral daily  insulin glargine Injectable (LANTUS) 40 Unit(s) SubCutaneous at bedtime  insulin lispro (HumaLOG) corrective regimen sliding scale   SubCutaneous three times a day before meals  insulin lispro (HumaLOG) corrective regimen sliding scale   SubCutaneous at bedtime  insulin lispro Injectable (HumaLOG) 8 Unit(s) SubCutaneous three times a day before meals  ipratropium  for Nebulization. 500 MICROGram(s) Nebulizer once  labetalol 200 milliGRAM(s) Oral two times a day  loratadine 10 milliGRAM(s) Oral daily  losartan 100 milliGRAM(s) Oral daily  multivitamin/minerals 1 Tablet(s) Oral daily  polyethylene glycol 3350 17 Gram(s) Oral daily  potassium chloride   Solution 20 milliEquivalent(s) Oral once  senna 2 Tablet(s) Oral at bedtime  tamsulosin 0.4 milliGRAM(s) Oral at bedtime      Physical Exam  General: Patient comfortable in bed  Vital Signs Last 12 Hrs  T(F): 97.6 (12-24-19 @ 13:02), Max: 97.7 (12-24-19 @ 04:09)  HR: 70 (12-24-19 @ 13:02) (69 - 85)  BP: 104/65 (12-24-19 @ 13:02) (104/65 - 111/62)  BP(mean): --  RR: 18 (12-24-19 @ 13:02) (18 - 18)  SpO2: 97% (12-24-19 @ 13:02) (96% - 97%)  Neck: No palpable thyroid nodules.  CVS: S1S2, No murmurs  Respiratory: No wheezing, no crepitations  GI: Abdomen soft, bowel sounds positive  Musculoskeletal:  edema lower extremities.   Skin: No skin rashes, no ecchymosis    Diagnostics

## 2019-12-24 NOTE — PROGRESS NOTE ADULT - PROBLEM SELECTOR PLAN 6
- will treat with Flomax 0.4

## 2019-12-24 NOTE — PROGRESS NOTE ADULT - PROBLEM SELECTOR PROBLEM 4
Type 2 diabetes mellitus with other specified complication, with long-term current use of insulin
HTN (hypertension)
Type 2 diabetes mellitus with other specified complication, with long-term current use of insulin

## 2019-12-24 NOTE — PROVIDER CONTACT NOTE (OTHER) - ASSESSMENT
patient FS 66
Pt A&Ox2, VSS. Pt asymptomatic. Pt recently received dinner tray and is about to be assisted with meals.
Pt A&Ox3, VSS. Pt asymptomatic.
Pt A&Ox3, VSS. Pt asymptomatic.
pt asymptomatic. VSS. pt calm and comfortable.
pt denies sob/ difficulty breathing o2 sat 100% on 3-4 L nasal cannula other vss

## 2019-12-24 NOTE — PROVIDER CONTACT NOTE (OTHER) - NAME OF MD/NP/PA/DO NOTIFIED:
GONZALO hill
GONZALO Donohue
GONZALO Johnson
Ingris Curiel
Ingris JOSEPH
Marleny Mayfield NP
OPAL Johnson

## 2019-12-24 NOTE — PROGRESS NOTE ADULT - ASSESSMENT
83 yo male PMhx DM on insulin, HTN, BPH, HLD p/w generalized weakness and inability to ambulate after a fall, congestion with GRANT     congestion/wheezing  likely URI  abx stopped by pulm  trey  pulm follow up  improved     fevers  id consulted  check blood and urine cult - ngtd  abx now stopped   resolved     afib with rvr  self converted to NSR  cont eliquis as per cards/ep  echo noted  cont tele monitor  plan for biotel patch    dysphagia diet as per swallow eval

## 2019-12-24 NOTE — PROVIDER CONTACT NOTE (OTHER) - RECOMMENDATIONS
held pre meal and coverage insulin, 4 ounce apple juice given to the patient and repeat FS 70
Notify NP. Perform BG check as per protocols. Continue to monitor.
Notify NP/MD. Perform BG check as per protocols. Continue to monitor.
Notify PA. Perform BG check as per protocols. Continue to monitor.
PA made aware.

## 2019-12-24 NOTE — PROVIDER CONTACT NOTE (OTHER) - BACKGROUND
repeated falls
Pt admitted for freq falls
pt admitted with multiple falls
pt admitted with multiple falls
pt admitted with repeated falls

## 2019-12-24 NOTE — PROGRESS NOTE ADULT - ASSESSMENT
ASSESSMENT:    atrial fibrillation with RVR -> likely related to beta-agonist medications -> now in NSR    confusion and leukocytosis likely related to systemic steroids -> improved -> no evidence of acute CNS pathology on brain CT     rhinorrhea, post-nasal drip, nasal congestion -> URI/sinusitis although RVP is negative -> improved    bronchospasm -> resolved    likely obstructive sleep apnea    recurrent falls with mild elevation in CPK    HTN/HLD/DM    PLAN/RECOMMENDATIONS:    stable oxygenation on room air  observe off antibiotics  observe off systemic steroids  observe off nebs  flonase/claritin   await cardiology reevaluation  electrophysiology follow-up noted  cardiac meds: diltiazem (change to long acting formulation)/labetalol/cozaar/HCTZ/full dose lovenox -> eliquis or xarelto for discharge  DVT prophylaxis  glucose control  out of bed and into the chair  physical therapy   outpatient balance and strengthening training  flomax    Will follow with you. Plan of care discussed with the patient and his family at bedside and the dedicated floor NP. No pulmonary objection to discharge    Ignacio Rollins MD, [facility]  [phone]  Pulmonary Medicine

## 2019-12-24 NOTE — PROGRESS NOTE ADULT - ASSESSMENT
83 y/o male with PMH of DM on Insulin, HTN, HLD, BPH, p/w unwitnessed fall the day prior to admission with inability to get up and ambulate. Patient reports he slipped off the bed onto the floor, unable to get up and slept on the floor. Patient had a mechanical fall last Thanksgiving when he tripped and fell forward and developed hematoma. Head CT was negative. He also c/o of congestion with GRANT/URI, he was given steroids and nebulizers. On 12/18/19 an RRT was called on him 2/2 tachycardia in the 170's, found to be in new onset atrial fibrillation, placed on Lovenox 90mg SQ bid. EPS consulted for KATHLEEN CV today 12/23/19.    # New Onset Atrial Fibrillation  # URI likely Viral  - Continue telemetry monitoring  - Patient spontaneously converted to sinus rhythm on 12/23/19 while in transit to [facility] and remains in sinus rhythm without ectopy overnight  - On full dose Lovenox 90mg SQ bid for AC with plan to change to NOAC upon discharge  - Cardizem 60mg q8h for rate control, would change to long acting Diltiazem upon discharge  - Called [facility] [employer] patch today for continues monitoring  and f/u with his cardiologist as outpatient  - Above d/w Medicine NP  - Will sign off, recall as needed    #[phone]

## 2019-12-24 NOTE — PROGRESS NOTE ADULT - ASSESSMENT
85 yo male PMhx DM on insulin, HTN, BPH, HLD presents to the ED with unwitnessed fall the day prior with inability to get up and ambulate. Pt reports he slipped off the bed the day prior onto floor and was unable to get up.  Wife attempted to help him up but was unable, so placed pillow under head and pt slept on floor overnight. This AM pt was still unable to get up so EMS was called. Patient states he uses a cane to ambulate outside the house, but ambulate independently.  Patient had a trip and fall outside the house in [facility], resulting in bruising and hematoma R chin, abd, and R frontal scalp, evaluated here in ED, and discharged home.  Patient has been feeling congested for the past couple days, decrease PO intake, and GRANT.  Patient denies fever (but on Ibuprofen 800 3x daily for back and leg pain), chest pain, cough, LOC, dysuria or abd pain.  No sick contact.  c/o gen weakness and inability to ambulate (15 Dec 2019 22:05)    ER vitals:  T 98.2, P 104, /98.  Tmax 100.8 (R).  No leukocytosis.  No acute fx on CT imaging.  CT c/a/p no visceral injuries, no consolidation, no infectious focus identified.  UA (-) nit/LE.  RVP (-) x 2.      Pt seen by Pulm for c/o  rhinorrhea, post-nasal drip, nasal congestion.  Started on treatment for URI with bronchospasm although RVP (-).   -> URI although RVP is negative.  Pt started on Prednisone 50mg Qdaily and augmentin 500mg PO bid.     ID consult called for evaluation of fever.     Fever:    - Possible URI given congestion with post-nasal drip, pt with wheezing.  Started on steroids and empiric abx.  No consolidations on CT imaging.      - Pt s/p completion of augmentin.    - s/p RRT on 12/18 for rapid A.fib - transferred to telemetry.   Pt currently afebrile, normal WBC.  Pt without new localizing symptoms on clinical exam.  Denies productive cough, congestion/sore throat.  Repeat bcx from 12/18 negative.  Repeat cxr 12/18 shows clear lungs.   Cont to monitor off abx.    * Pt converted to sinus rhythm on 12/23, KATHLEEN/DCCV cancelled.  Cardiology following.      Will follow,    Zenia Berg  [phone]

## 2019-12-24 NOTE — PROVIDER CONTACT NOTE (OTHER) - DATE AND TIME:
24-Dec-2019 16:45
15-Dec-2019 21:54
18-Dec-2019 22:30
18-Dec-2019 23:30
19-Dec-2019 17:15
22-Dec-2019 20:57
23-Dec-2019 06:20

## 2019-12-24 NOTE — PROGRESS NOTE ADULT - ASSESSMENT
Assessment  DMT2: 84y Male with DM T2 with hyperglycemia, A1C 7.9%, was on oral meds and insulin at home, now on basal bolus insulin, decreased dose yesterday, blood sugars improved and trending within acceptable range, no new hypoglycemic episodes. Patient is eating meals, appears comfortable, family seen by the bedside. Planning DC to rehab today.  URI: on management, stable, monitored.  Frequent Falls: On fall precautions, PT eval.  HTN: Controlled,  on antihypertensive medications.  HLD: Controlled, on statin.          Gentry Cole MD  Cell: [phone]  Office: [phone] Assessment  DMT2: 84y Male with DM T2 with hyperglycemia, A1C 7.9%, was on oral meds and insulin at home, now on basal bolus insulin, decreased dose yesterday, blood sugars improved and trending within acceptable range,  no new hypoglycemic episodes. Patient is eating meals, appears comfortable, family seen by the bedside. Planning DC to rehab today.  URI: on management, stable, monitored.  Frequent Falls: On fall precautions, PT eval.  HTN: Controlled,  on antihypertensive medications.  HLD: Controlled, on statin.          Gentry Cole MD  Cell: [phone]  Office: [phone]

## 2019-12-24 NOTE — PROVIDER CONTACT NOTE (OTHER) - SITUATION
patient FS 65
B/P 97/62
FS 62
FS 77
Pt w/ BG of 63/64/80
pt RR 30
pt blood sugar 143, due for 56 units lantus  pt coughing hr up to 170

## 2019-12-24 NOTE — PROGRESS NOTE ADULT - SUBJECTIVE AND OBJECTIVE BOX
Infectious Diseases progress note:    Subjective: Pt converted to sinus rhythm yesterday, KATHLEEN/DCCV cancelled.  Pt denies f/c/cough/congestion.  Eating lunch, appears confortable.     ROS:  CONSTITUTIONAL:  No fever, chills, rigors  CARDIOVASCULAR:  No chest pain or palpitations  RESPIRATORY:   No SOB, cough, dyspnea on exertion.  No wheezing  GASTROINTESTINAL:  No abd pain, N/V, diarrhea/constipation  EXTREMITIES:  No swelling or joint pain  GENITOURINARY:  No burning on urination, increased frequency or urgency.  No flank pain  NEUROLOGIC:  No HA, visual disturbances  SKIN: No rashes    Allergies    No Known Allergies    Intolerances        ANTIBIOTICS/RELEVANT:  antimicrobials    immunologic:    OTHER:  acetaminophen  IVPB .. 1000 milliGRAM(s) IV Intermittent once  apixaban 5 milliGRAM(s) Oral two times a day  artificial tears (preservative free) Ophthalmic Solution 1 Drop(s) Both EYES four times a day PRN  cholecalciferol 2000 Unit(s) Oral daily  dextrose 40% Gel 15 Gram(s) Oral once PRN  dextrose 5% + sodium chloride 0.45%. 1000 milliLiter(s) IV Continuous <Continuous>  dextrose 5%. 1000 milliLiter(s) IV Continuous <Continuous>  dextrose 50% Injectable 12.5 Gram(s) IV Push once  dextrose 50% Injectable 25 Gram(s) IV Push once  dextrose 50% Injectable 25 Gram(s) IV Push once  diltiazem    Tablet 60 milliGRAM(s) Oral every 8 hours  fluticasone propionate 50 MICROgram(s)/spray Nasal Spray 1 Spray(s) Both Nostrils two times a day  glucagon  Injectable 1 milliGRAM(s) IntraMuscular once PRN  hydrochlorothiazide 12.5 milliGRAM(s) Oral daily  insulin glargine Injectable (LANTUS) 40 Unit(s) SubCutaneous at bedtime  insulin lispro (HumaLOG) corrective regimen sliding scale   SubCutaneous three times a day before meals  insulin lispro (HumaLOG) corrective regimen sliding scale   SubCutaneous at bedtime  insulin lispro Injectable (HumaLOG) 8 Unit(s) SubCutaneous three times a day before meals  ipratropium  for Nebulization. 500 MICROGram(s) Nebulizer once  labetalol 200 milliGRAM(s) Oral two times a day  loratadine 10 milliGRAM(s) Oral daily  losartan 100 milliGRAM(s) Oral daily  multivitamin/minerals 1 Tablet(s) Oral daily  polyethylene glycol 3350 17 Gram(s) Oral daily  potassium chloride   Solution 20 milliEquivalent(s) Oral once  senna 2 Tablet(s) Oral at bedtime  tamsulosin 0.4 milliGRAM(s) Oral at bedtime      Objective:  Vital Signs Last 24 Hrs  T(C): 36.5 (24 Dec 2019 04:09), Max: 36.8 (23 Dec 2019 20:55)  T(F): 97.7 (24 Dec 2019 04:09), Max: 98.3 (23 Dec 2019 20:55)  HR: 85 (24 Dec 2019 09:01) (69 - 108)  BP: 111/62 (24 Dec 2019 09:01) (96/56 - 118/70)  BP(mean): --  RR: 18 (24 Dec 2019 04:09) (18 - 18)  SpO2: 96% (24 Dec 2019 04:09) (96% - 98%)    PHYSICAL EXAM:  Constitutional:NAD  Eyes:CURTIS, EOMI  Ear/Nose/Throat: no thrush, mucositis.  Moist mucous membranes	  Neck:no JVD, no lymphadenopathy, supple  Respiratory: CTA jamila  Cardiovascular: S1S2 RRR, no murmurs  Gastrointestinal:soft, nontender,  nondistended (+) BS  Extremities:no e/e/c  Skin:  no rashes, open wounds or ulcerations        LABS:                        12.8   10.70 )-----------( 221      ( 23 Dec 2019 07:45 )             39.6     12-23    143  |  103  |  26<H>  ----------------------------<  79  3.6   |  30  |  1.32<H>    Ca    9.5      23 Dec 2019 06:14                      Rapid RVP Result: NotDetec  Rapid RVP Result: NotDetec          MICROBIOLOGY:    Culture - Blood (12.18.19 @ 22:01)    Specimen Source: .Blood Blood-Peripheral    Culture Results:   No growth at 5 days.    Culture - Blood (12.18.19 @ 22:01)    Specimen Source: .Blood Blood    Culture Results:   No growth at 5 days.    Culture - Blood (12.16.19 @ 17:34)    Specimen Source: .Blood Blood-Peripheral    Culture Results:   No growth at 5 days.    Culture - Blood (12.16.19 @ 17:34)    Specimen Source: .Blood Blood-Peripheral    Culture Results:   No growth at 5 days.    Culture - Urine (12.15.19 @ 22:11)    Specimen Source: .Urine Clean Catch (Midstream)    Culture Results:   Aerococcus species  "Aerococcus spp. are predictably susceptible to penicillin,  ampicillin, tetracycline, and vancomycin.  All isolates are  resistant to sulfonamides"  <10,000 CFU/ml Normal Urogenital pema present          RADIOLOGY & ADDITIONAL STUDIES:

## 2019-12-26 ENCOUNTER — INBOUND DOCUMENT (OUTPATIENT)
Age: 84
End: 2019-12-26

## 2019-12-31 ENCOUNTER — INBOUND DOCUMENT (OUTPATIENT)
Age: 84
End: 2019-12-31

## 2020-01-02 ENCOUNTER — INBOUND DOCUMENT (OUTPATIENT)
Age: 85
End: 2020-01-02

## 2020-01-14 ENCOUNTER — APPOINTMENT (OUTPATIENT)
Dept: CARDIOLOGY | Facility: CLINIC | Age: 85
End: 2020-01-14

## 2020-01-19 ENCOUNTER — INPATIENT (INPATIENT)
Facility: HOSPITAL | Age: 85
LOS: 2 days | Discharge: HOME HEALTH | DRG: 312 | End: 2020-01-22
Attending: INTERNAL MEDICINE | Admitting: INTERNAL MEDICINE
Payer: MEDICARE

## 2020-01-19 VITALS
HEART RATE: 77 BPM | WEIGHT: 190.04 LBS | RESPIRATION RATE: 18 BRPM | SYSTOLIC BLOOD PRESSURE: 113 MMHG | OXYGEN SATURATION: 100 % | HEIGHT: 69 IN | TEMPERATURE: 98 F | DIASTOLIC BLOOD PRESSURE: 61 MMHG

## 2020-01-19 DIAGNOSIS — R29.6 REPEATED FALLS: ICD-10-CM

## 2020-01-19 DIAGNOSIS — Z98.890 OTHER SPECIFIED POSTPROCEDURAL STATES: Chronic | ICD-10-CM

## 2020-01-19 LAB
ALBUMIN SERPL ELPH-MCNC: 2.5 G/DL — LOW (ref 3.3–5)
ALP SERPL-CCNC: 59 U/L — SIGNIFICANT CHANGE UP (ref 40–120)
ALT FLD-CCNC: 21 U/L — SIGNIFICANT CHANGE UP (ref 10–45)
ANION GAP SERPL CALC-SCNC: 12 MMOL/L — SIGNIFICANT CHANGE UP (ref 5–17)
ANION GAP SERPL CALC-SCNC: 12 MMOL/L — SIGNIFICANT CHANGE UP (ref 5–17)
APPEARANCE UR: CLEAR — SIGNIFICANT CHANGE UP
APTT BLD: 33.6 SEC — SIGNIFICANT CHANGE UP (ref 27.5–36.3)
AST SERPL-CCNC: 15 U/L — SIGNIFICANT CHANGE UP (ref 10–40)
BACTERIA # UR AUTO: NEGATIVE — SIGNIFICANT CHANGE UP
BASE EXCESS BLDV CALC-SCNC: 1.5 MMOL/L — SIGNIFICANT CHANGE UP (ref -2–2)
BASE EXCESS BLDV CALC-SCNC: 2.1 MMOL/L — HIGH (ref -2–2)
BILIRUB SERPL-MCNC: 0.2 MG/DL — SIGNIFICANT CHANGE UP (ref 0.2–1.2)
BILIRUB UR-MCNC: NEGATIVE — SIGNIFICANT CHANGE UP
BUN SERPL-MCNC: 23 MG/DL — SIGNIFICANT CHANGE UP (ref 7–23)
BUN SERPL-MCNC: 28 MG/DL — HIGH (ref 7–23)
CA-I SERPL-SCNC: 1.14 MMOL/L — SIGNIFICANT CHANGE UP (ref 1.12–1.3)
CA-I SERPL-SCNC: 1.16 MMOL/L — SIGNIFICANT CHANGE UP (ref 1.12–1.3)
CALCIUM SERPL-MCNC: 6.6 MG/DL — LOW (ref 8.4–10.5)
CALCIUM SERPL-MCNC: 8.6 MG/DL — SIGNIFICANT CHANGE UP (ref 8.4–10.5)
CHLORIDE BLDV-SCNC: 102 MMOL/L — SIGNIFICANT CHANGE UP (ref 96–108)
CHLORIDE BLDV-SCNC: 103 MMOL/L — SIGNIFICANT CHANGE UP (ref 96–108)
CHLORIDE SERPL-SCNC: 110 MMOL/L — HIGH (ref 96–108)
CHLORIDE SERPL-SCNC: 99 MMOL/L — SIGNIFICANT CHANGE UP (ref 96–108)
CO2 BLDV-SCNC: 27 MMOL/L — SIGNIFICANT CHANGE UP (ref 22–30)
CO2 BLDV-SCNC: 28 MMOL/L — SIGNIFICANT CHANGE UP (ref 22–30)
CO2 SERPL-SCNC: 19 MMOL/L — LOW (ref 22–31)
CO2 SERPL-SCNC: 24 MMOL/L — SIGNIFICANT CHANGE UP (ref 22–31)
COLOR SPEC: YELLOW — SIGNIFICANT CHANGE UP
CREAT SERPL-MCNC: 0.94 MG/DL — SIGNIFICANT CHANGE UP (ref 0.5–1.3)
CREAT SERPL-MCNC: 1.16 MG/DL — SIGNIFICANT CHANGE UP (ref 0.5–1.3)
DIFF PNL FLD: NEGATIVE — SIGNIFICANT CHANGE UP
EPI CELLS # UR: 1 /HPF — SIGNIFICANT CHANGE UP
GAS PNL BLDV: 134 MMOL/L — LOW (ref 135–145)
GAS PNL BLDV: 137 MMOL/L — SIGNIFICANT CHANGE UP (ref 135–145)
GAS PNL BLDV: SIGNIFICANT CHANGE UP
GLUCOSE BLDV-MCNC: 199 MG/DL — HIGH (ref 70–99)
GLUCOSE BLDV-MCNC: 245 MG/DL — HIGH (ref 70–99)
GLUCOSE SERPL-MCNC: 205 MG/DL — HIGH (ref 70–99)
GLUCOSE SERPL-MCNC: 211 MG/DL — HIGH (ref 70–99)
GLUCOSE UR QL: ABNORMAL
HCO3 BLDV-SCNC: 26 MMOL/L — SIGNIFICANT CHANGE UP (ref 21–29)
HCO3 BLDV-SCNC: 27 MMOL/L — SIGNIFICANT CHANGE UP (ref 21–29)
HCT VFR BLD CALC: 34.5 % — LOW (ref 39–50)
HCT VFR BLDA CALC: 34 % — LOW (ref 39–50)
HCT VFR BLDA CALC: 36 % — LOW (ref 39–50)
HGB BLD CALC-MCNC: 10.9 G/DL — LOW (ref 13–17)
HGB BLD CALC-MCNC: 11.6 G/DL — LOW (ref 13–17)
HGB BLD-MCNC: 11.4 G/DL — LOW (ref 13–17)
HYALINE CASTS # UR AUTO: 0 /LPF — SIGNIFICANT CHANGE UP (ref 0–2)
INR BLD: 1.47 RATIO — HIGH (ref 0.88–1.16)
KETONES UR-MCNC: SIGNIFICANT CHANGE UP
LACTATE BLDV-MCNC: 4.1 MMOL/L — CRITICAL HIGH (ref 0.7–2)
LACTATE BLDV-MCNC: 5 MMOL/L — CRITICAL HIGH (ref 0.7–2)
LEUKOCYTE ESTERASE UR-ACNC: NEGATIVE — SIGNIFICANT CHANGE UP
MCHC RBC-ENTMCNC: 30.9 PG — SIGNIFICANT CHANGE UP (ref 27–34)
MCHC RBC-ENTMCNC: 33 GM/DL — SIGNIFICANT CHANGE UP (ref 32–36)
MCV RBC AUTO: 93.5 FL — SIGNIFICANT CHANGE UP (ref 80–100)
NITRITE UR-MCNC: NEGATIVE — SIGNIFICANT CHANGE UP
NRBC # BLD: 0 /100 WBCS — SIGNIFICANT CHANGE UP (ref 0–0)
PCO2 BLDV: 40 MMHG — SIGNIFICANT CHANGE UP (ref 35–50)
PCO2 BLDV: 45 MMHG — SIGNIFICANT CHANGE UP (ref 35–50)
PH BLDV: 7.39 — SIGNIFICANT CHANGE UP (ref 7.35–7.45)
PH BLDV: 7.42 — SIGNIFICANT CHANGE UP (ref 7.35–7.45)
PH UR: 6 — SIGNIFICANT CHANGE UP (ref 5–8)
PLATELET # BLD AUTO: 189 K/UL — SIGNIFICANT CHANGE UP (ref 150–400)
PO2 BLDV: 21 MMHG — LOW (ref 25–45)
PO2 BLDV: 38 MMHG — SIGNIFICANT CHANGE UP (ref 25–45)
POTASSIUM BLDV-SCNC: 3.8 MMOL/L — SIGNIFICANT CHANGE UP (ref 3.5–5.3)
POTASSIUM BLDV-SCNC: 4.4 MMOL/L — SIGNIFICANT CHANGE UP (ref 3.5–5.3)
POTASSIUM SERPL-MCNC: 3.5 MMOL/L — SIGNIFICANT CHANGE UP (ref 3.5–5.3)
POTASSIUM SERPL-MCNC: 3.9 MMOL/L — SIGNIFICANT CHANGE UP (ref 3.5–5.3)
POTASSIUM SERPL-SCNC: 3.5 MMOL/L — SIGNIFICANT CHANGE UP (ref 3.5–5.3)
POTASSIUM SERPL-SCNC: 3.9 MMOL/L — SIGNIFICANT CHANGE UP (ref 3.5–5.3)
PROT SERPL-MCNC: 4.5 G/DL — LOW (ref 6–8.3)
PROT UR-MCNC: ABNORMAL
PROTHROM AB SERPL-ACNC: 17 SEC — HIGH (ref 10–12.9)
RBC # BLD: 3.69 M/UL — LOW (ref 4.2–5.8)
RBC # FLD: 12.7 % — SIGNIFICANT CHANGE UP (ref 10.3–14.5)
RBC CASTS # UR COMP ASSIST: 1 /HPF — SIGNIFICANT CHANGE UP (ref 0–4)
SAO2 % BLDV: 26 % — LOW (ref 67–88)
SAO2 % BLDV: 70 % — SIGNIFICANT CHANGE UP (ref 67–88)
SODIUM SERPL-SCNC: 135 MMOL/L — SIGNIFICANT CHANGE UP (ref 135–145)
SODIUM SERPL-SCNC: 141 MMOL/L — SIGNIFICANT CHANGE UP (ref 135–145)
SP GR SPEC: 1.02 — SIGNIFICANT CHANGE UP (ref 1.01–1.02)
TROPONIN T, HIGH SENSITIVITY RESULT: 30 NG/L — SIGNIFICANT CHANGE UP (ref 0–51)
UROBILINOGEN FLD QL: NEGATIVE — SIGNIFICANT CHANGE UP
WBC # BLD: 10.01 K/UL — SIGNIFICANT CHANGE UP (ref 3.8–10.5)
WBC # FLD AUTO: 10.01 K/UL — SIGNIFICANT CHANGE UP (ref 3.8–10.5)
WBC UR QL: 3 /HPF — SIGNIFICANT CHANGE UP (ref 0–5)

## 2020-01-19 PROCEDURE — 70450 CT HEAD/BRAIN W/O DYE: CPT | Mod: 26

## 2020-01-19 PROCEDURE — 93010 ELECTROCARDIOGRAM REPORT: CPT

## 2020-01-19 PROCEDURE — 71045 X-RAY EXAM CHEST 1 VIEW: CPT | Mod: 26

## 2020-01-19 PROCEDURE — 99285 EMERGENCY DEPT VISIT HI MDM: CPT

## 2020-01-19 RX ORDER — SODIUM CHLORIDE 9 MG/ML
1000 INJECTION INTRAMUSCULAR; INTRAVENOUS; SUBCUTANEOUS ONCE
Refills: 0 | Status: COMPLETED | OUTPATIENT
Start: 2020-01-19 | End: 2020-01-19

## 2020-01-19 RX ADMIN — SODIUM CHLORIDE 1000 MILLILITER(S): 9 INJECTION INTRAMUSCULAR; INTRAVENOUS; SUBCUTANEOUS at 23:04

## 2020-01-19 RX ADMIN — SODIUM CHLORIDE 1000 MILLILITER(S): 9 INJECTION INTRAMUSCULAR; INTRAVENOUS; SUBCUTANEOUS at 21:07

## 2020-01-19 NOTE — ED PROVIDER NOTE - OBJECTIVE STATEMENT
84y m PMHx DM2, BPH, HTN, recently diagnosed a fib on eliquis p/w fall at home. Pts family at bedside report he had a witnessed fall tonight - he was ambulating with his walker, walked about 7 feet and fell to his right side on his shoulder without head trauma/LOC. As per pt, his "legs gave out." He denies nausea, vision changes, palpitations, diaphoresis, or any symptoms felt before or after the fall. Family reports he has had a productive cough this week. Pt reports he is feeling well without complaint.  Denies fever, chills, CP, SOB, palpitations, abd pain, n/v/d/c, joint pain, recent travel, leg swelling/pain, vision changes, numbness, weakness, slurred speech, or urinary symptoms.  PMD/cardiologist Vladimir Mendieta.

## 2020-01-19 NOTE — ED ADULT NURSE REASSESSMENT NOTE - NS ED NURSE REASSESS COMMENT FT1
Pt AAOx4, NAD, resp nonlabored, resting comfortably in bed with family at bedside. Pt denies headache, dizziness, chest pain, palpitations, SOB, abd pain, n/v/d, urinary symptoms, fevers, chills, weakness at this time. Pt awaiting urine results. Safety maintained.

## 2020-01-19 NOTE — ED PROVIDER NOTE - PMH
BPH (benign prostatic hyperplasia)    Diabetes mellitus type 2 in nonobese    HTN (hypertension)    Hyperlipidemia

## 2020-01-19 NOTE — ED PROVIDER NOTE - ATTENDING CONTRIBUTION TO CARE
Attending MD Ayon:   I personally have seen and examined this patient.  Physician assistant note reviewed and agree on plan of care and except where noted.  See below for details.     Seen in [facility]18, accompanied by family    84M with PMH/PSH including DM, HTN, BPH, AFib on Eliquis (recent dx) presents to the ED s/p fall.  Family reports that patient was ambulating with walker and fell onto R side.  Patient reports legs "gave out".  Patient has had other recent falls.  Denies preceding dizziness, weakness, sensory changes.  Denies LOC, hitting head Denies change in vision, double vision, sudden loss of vision. Denies chest pain, shortness of breath, palpitations.  Family reports patient has been having cough for a week.  Denies abdominal pain, nausea, vomiting, diarrhea, blood in stools. Denies fevers, chills.  Denies dysuria, hematuria, change in urinary habits including frequency, urgency. Denies neck or back pain.  On exam, NAD, AAOx2 (person, place, not time, family reports baseline), head NCAT, CN 2-12 grossly intact, PERRL, FROM at neck, no tenderness to midline palpation, no stepoffs along length of spine, lungs CTAB with good inspiratory effort, +S1S2, no m/r/g, abdomen soft with +BS, NT, ND, no CVAT, moving all extremities with good and equal strength, +2 DPs; A/P 84M with repeated falls, will obtain CTH to rule out ICH, will obtain CXR, will obtain EKG, labs, place on tele, ?falls from arrhythmia, will rule out underlying infectious process although lower suspicion

## 2020-01-19 NOTE — ED ADULT NURSE NOTE - OBJECTIVE STATEMENT
84yr old male w/ pmhx of AFib (on eliquis), DM2, BPH, and HTN BIBEMS, c/o a mechanical fall. Pt states he was ambulating to his walker at home, and fell on his R shoulder. Pt denies any head trauma, or LOC. Pt states it felt as if his "legs went out" The fall was witnessed by pts family. Per pts family pt has been having falls since thanksgiving. Pt denies any vision changes, auras, nausea, CP, SOB prior to fall,. Pt also denies having any other symptoms at this time, Pts family states he did have a productive cough last week. Pt denies, fevers, chills, edema, numbness, tingling, vomiting diarrhea or recent travels. Pt assessed by MD, bed lowered and locked with family at bedside will reassess.

## 2020-01-20 ENCOUNTER — APPOINTMENT (OUTPATIENT)
Dept: CARDIOLOGY | Facility: CLINIC | Age: 85
End: 2020-01-20

## 2020-01-20 DIAGNOSIS — R55 SYNCOPE AND COLLAPSE: ICD-10-CM

## 2020-01-20 DIAGNOSIS — I48.0 PAROXYSMAL ATRIAL FIBRILLATION: ICD-10-CM

## 2020-01-20 DIAGNOSIS — I10 ESSENTIAL (PRIMARY) HYPERTENSION: ICD-10-CM

## 2020-01-20 DIAGNOSIS — E87.2 ACIDOSIS: ICD-10-CM

## 2020-01-20 DIAGNOSIS — E11.69 TYPE 2 DIABETES MELLITUS WITH OTHER SPECIFIED COMPLICATION: ICD-10-CM
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
## 2020-01-20 LAB
ALBUMIN SERPL ELPH-MCNC: 3.1 G/DL — LOW (ref 3.3–5)
ALP SERPL-CCNC: 68 U/L — SIGNIFICANT CHANGE UP (ref 40–120)
ALT FLD-CCNC: 21 U/L — SIGNIFICANT CHANGE UP (ref 10–45)
ANION GAP SERPL CALC-SCNC: 13 MMOL/L — SIGNIFICANT CHANGE UP (ref 5–17)
AST SERPL-CCNC: 17 U/L — SIGNIFICANT CHANGE UP (ref 10–40)
BASOPHILS # BLD AUTO: 0.02 K/UL — SIGNIFICANT CHANGE UP (ref 0–0.2)
BASOPHILS NFR BLD AUTO: 0.3 % — SIGNIFICANT CHANGE UP (ref 0–2)
BILIRUB SERPL-MCNC: 0.3 MG/DL — SIGNIFICANT CHANGE UP (ref 0.2–1.2)
BUN SERPL-MCNC: 24 MG/DL — HIGH (ref 7–23)
CALCIUM SERPL-MCNC: 8.3 MG/DL — LOW (ref 8.4–10.5)
CHLORIDE SERPL-SCNC: 103 MMOL/L — SIGNIFICANT CHANGE UP (ref 96–108)
CO2 SERPL-SCNC: 23 MMOL/L — SIGNIFICANT CHANGE UP (ref 22–31)
CREAT SERPL-MCNC: 1.14 MG/DL — SIGNIFICANT CHANGE UP (ref 0.5–1.3)
EOSINOPHIL # BLD AUTO: 0.48 K/UL — SIGNIFICANT CHANGE UP (ref 0–0.5)
EOSINOPHIL NFR BLD AUTO: 6.3 % — HIGH (ref 0–6)
GAS PNL BLDV: SIGNIFICANT CHANGE UP
GLUCOSE BLDC GLUCOMTR-MCNC: 136 MG/DL — HIGH (ref 70–99)
GLUCOSE BLDC GLUCOMTR-MCNC: 194 MG/DL — HIGH (ref 70–99)
GLUCOSE BLDC GLUCOMTR-MCNC: 223 MG/DL — HIGH (ref 70–99)
GLUCOSE BLDC GLUCOMTR-MCNC: 243 MG/DL — HIGH (ref 70–99)
GLUCOSE BLDC GLUCOMTR-MCNC: 271 MG/DL — HIGH (ref 70–99)
GLUCOSE SERPL-MCNC: 151 MG/DL — HIGH (ref 70–99)
HBA1C BLD-MCNC: 8.4 % — HIGH (ref 4–5.6)
HCT VFR BLD CALC: 30.3 % — LOW (ref 39–50)
HGB BLD-MCNC: 10.2 G/DL — LOW (ref 13–17)
IMM GRANULOCYTES NFR BLD AUTO: 0.5 % — SIGNIFICANT CHANGE UP (ref 0–1.5)
INR BLD: 1.53 RATIO — HIGH (ref 0.88–1.16)
LACTATE SERPL-SCNC: 2.8 MMOL/L — HIGH (ref 0.7–2)
LYMPHOCYTES # BLD AUTO: 1.78 K/UL — SIGNIFICANT CHANGE UP (ref 1–3.3)
LYMPHOCYTES # BLD AUTO: 23.3 % — SIGNIFICANT CHANGE UP (ref 13–44)
MAGNESIUM SERPL-MCNC: 1.5 MG/DL — LOW (ref 1.6–2.6)
MCHC RBC-ENTMCNC: 31.5 PG — SIGNIFICANT CHANGE UP (ref 27–34)
MCHC RBC-ENTMCNC: 33.7 GM/DL — SIGNIFICANT CHANGE UP (ref 32–36)
MCV RBC AUTO: 93.5 FL — SIGNIFICANT CHANGE UP (ref 80–100)
MONOCYTES # BLD AUTO: 0.61 K/UL — SIGNIFICANT CHANGE UP (ref 0–0.9)
MONOCYTES NFR BLD AUTO: 8 % — SIGNIFICANT CHANGE UP (ref 2–14)
NEUTROPHILS # BLD AUTO: 4.7 K/UL — SIGNIFICANT CHANGE UP (ref 1.8–7.4)
NEUTROPHILS NFR BLD AUTO: 61.6 % — SIGNIFICANT CHANGE UP (ref 43–77)
PHOSPHATE SERPL-MCNC: 3 MG/DL — SIGNIFICANT CHANGE UP (ref 2.5–4.5)
PLATELET # BLD AUTO: 179 K/UL — SIGNIFICANT CHANGE UP (ref 150–400)
POTASSIUM SERPL-MCNC: 3.8 MMOL/L — SIGNIFICANT CHANGE UP (ref 3.5–5.3)
POTASSIUM SERPL-SCNC: 3.8 MMOL/L — SIGNIFICANT CHANGE UP (ref 3.5–5.3)
PROT SERPL-MCNC: 5.3 G/DL — LOW (ref 6–8.3)
PROTHROM AB SERPL-ACNC: 17.7 SEC — HIGH (ref 10–12.9)
RBC # BLD: 3.24 M/UL — LOW (ref 4.2–5.8)
RBC # FLD: 12.9 % — SIGNIFICANT CHANGE UP (ref 10.3–14.5)
SODIUM SERPL-SCNC: 139 MMOL/L — SIGNIFICANT CHANGE UP (ref 135–145)
TROPONIN T, HIGH SENSITIVITY RESULT: 32 NG/L — SIGNIFICANT CHANGE UP (ref 0–51)
TROPONIN T, HIGH SENSITIVITY RESULT: 36 NG/L — SIGNIFICANT CHANGE UP (ref 0–51)
TSH SERPL-MCNC: 3.02 UIU/ML — SIGNIFICANT CHANGE UP (ref 0.27–4.2)
WBC # BLD: 7.63 K/UL — SIGNIFICANT CHANGE UP (ref 3.8–10.5)
WBC # FLD AUTO: 7.63 K/UL — SIGNIFICANT CHANGE UP (ref 3.8–10.5)

## 2020-01-20 PROCEDURE — 99223 1ST HOSP IP/OBS HIGH 75: CPT

## 2020-01-20 RX ORDER — LOSARTAN/HYDROCHLOROTHIAZIDE 100MG-25MG
1 TABLET ORAL
Qty: 0 | Refills: 0 | DISCHARGE

## 2020-01-20 RX ORDER — MAGNESIUM SULFATE 500 MG/ML
1 VIAL (ML) INJECTION ONCE
Refills: 0 | Status: COMPLETED | OUTPATIENT
Start: 2020-01-20 | End: 2020-01-20

## 2020-01-20 RX ORDER — INSULIN LISPRO 100/ML
VIAL (ML) SUBCUTANEOUS
Refills: 0 | Status: DISCONTINUED | OUTPATIENT
Start: 2020-01-20 | End: 2020-01-22

## 2020-01-20 RX ORDER — ATORVASTATIN CALCIUM 80 MG/1
40 TABLET, FILM COATED ORAL AT BEDTIME
Refills: 0 | Status: DISCONTINUED | OUTPATIENT
Start: 2020-01-20 | End: 2020-01-22

## 2020-01-20 RX ORDER — ASPIRIN/CALCIUM CARB/MAGNESIUM 324 MG
1 TABLET ORAL
Qty: 0 | Refills: 0 | DISCHARGE

## 2020-01-20 RX ORDER — DILTIAZEM HCL 120 MG
180 CAPSULE, EXT RELEASE 24 HR ORAL DAILY
Refills: 0 | Status: DISCONTINUED | OUTPATIENT
Start: 2020-01-20 | End: 2020-01-22

## 2020-01-20 RX ORDER — SODIUM CHLORIDE 9 MG/ML
1000 INJECTION INTRAMUSCULAR; INTRAVENOUS; SUBCUTANEOUS
Refills: 0 | Status: COMPLETED | OUTPATIENT
Start: 2020-01-20 | End: 2020-01-20

## 2020-01-20 RX ORDER — INSULIN GLARGINE 100 [IU]/ML
70 INJECTION, SOLUTION SUBCUTANEOUS
Qty: 0 | Refills: 0 | DISCHARGE

## 2020-01-20 RX ORDER — TAMSULOSIN HYDROCHLORIDE 0.4 MG/1
0.4 CAPSULE ORAL AT BEDTIME
Refills: 0 | Status: DISCONTINUED | OUTPATIENT
Start: 2020-01-20 | End: 2020-01-22

## 2020-01-20 RX ORDER — ASPIRIN/CALCIUM CARB/MAGNESIUM 324 MG
81 TABLET ORAL DAILY
Refills: 0 | Status: DISCONTINUED | OUTPATIENT
Start: 2020-01-20 | End: 2020-01-22

## 2020-01-20 RX ORDER — INSULIN LISPRO 100/ML
VIAL (ML) SUBCUTANEOUS AT BEDTIME
Refills: 0 | Status: DISCONTINUED | OUTPATIENT
Start: 2020-01-20 | End: 2020-01-22

## 2020-01-20 RX ORDER — METFORMIN HYDROCHLORIDE 850 MG/1
1 TABLET ORAL
Qty: 0 | Refills: 0 | DISCHARGE

## 2020-01-20 RX ORDER — TAMSULOSIN HYDROCHLORIDE 0.4 MG/1
1 CAPSULE ORAL
Qty: 0 | Refills: 0 | DISCHARGE

## 2020-01-20 RX ORDER — DEXTROSE 50 % IN WATER 50 %
12.5 SYRINGE (ML) INTRAVENOUS ONCE
Refills: 0 | Status: DISCONTINUED | OUTPATIENT
Start: 2020-01-20 | End: 2020-01-22

## 2020-01-20 RX ORDER — APIXABAN 2.5 MG/1
5 TABLET, FILM COATED ORAL
Refills: 0 | Status: DISCONTINUED | OUTPATIENT
Start: 2020-01-20 | End: 2020-01-22

## 2020-01-20 RX ORDER — SODIUM CHLORIDE 9 MG/ML
1000 INJECTION, SOLUTION INTRAVENOUS
Refills: 0 | Status: DISCONTINUED | OUTPATIENT
Start: 2020-01-20 | End: 2020-01-22

## 2020-01-20 RX ORDER — GLUCAGON INJECTION, SOLUTION 0.5 MG/.1ML
1 INJECTION, SOLUTION SUBCUTANEOUS ONCE
Refills: 0 | Status: DISCONTINUED | OUTPATIENT
Start: 2020-01-20 | End: 2020-01-22

## 2020-01-20 RX ORDER — INSULIN GLARGINE 100 [IU]/ML
30 INJECTION, SOLUTION SUBCUTANEOUS AT BEDTIME
Refills: 0 | Status: DISCONTINUED | OUTPATIENT
Start: 2020-01-20 | End: 2020-01-21

## 2020-01-20 RX ORDER — DEXTROSE 50 % IN WATER 50 %
15 SYRINGE (ML) INTRAVENOUS ONCE
Refills: 0 | Status: DISCONTINUED | OUTPATIENT
Start: 2020-01-20 | End: 2020-01-22

## 2020-01-20 RX ADMIN — APIXABAN 5 MILLIGRAM(S): 2.5 TABLET, FILM COATED ORAL at 05:03

## 2020-01-20 RX ADMIN — SODIUM CHLORIDE 200 MILLILITER(S): 9 INJECTION INTRAMUSCULAR; INTRAVENOUS; SUBCUTANEOUS at 16:40

## 2020-01-20 RX ADMIN — Medication 180 MILLIGRAM(S): at 05:03

## 2020-01-20 RX ADMIN — APIXABAN 5 MILLIGRAM(S): 2.5 TABLET, FILM COATED ORAL at 16:40

## 2020-01-20 RX ADMIN — INSULIN GLARGINE 30 UNIT(S): 100 INJECTION, SOLUTION SUBCUTANEOUS at 03:51

## 2020-01-20 RX ADMIN — SODIUM CHLORIDE 100 MILLILITER(S): 9 INJECTION INTRAMUSCULAR; INTRAVENOUS; SUBCUTANEOUS at 03:59

## 2020-01-20 RX ADMIN — ATORVASTATIN CALCIUM 40 MILLIGRAM(S): 80 TABLET, FILM COATED ORAL at 21:12

## 2020-01-20 RX ADMIN — Medication 1: at 22:04

## 2020-01-20 RX ADMIN — Medication 4: at 13:17

## 2020-01-20 RX ADMIN — INSULIN GLARGINE 30 UNIT(S): 100 INJECTION, SOLUTION SUBCUTANEOUS at 22:04

## 2020-01-20 RX ADMIN — Medication 2: at 09:07

## 2020-01-20 RX ADMIN — TAMSULOSIN HYDROCHLORIDE 0.4 MILLIGRAM(S): 0.4 CAPSULE ORAL at 21:12

## 2020-01-20 RX ADMIN — Medication 100 GRAM(S): at 09:07

## 2020-01-20 RX ADMIN — Medication 4: at 16:40

## 2020-01-20 RX ADMIN — Medication 81 MILLIGRAM(S): at 11:19

## 2020-01-20 NOTE — CONSULT NOTE ADULT - ASSESSMENT
84 year-old with history as above presents with weakness and fall, seen to have sinus bradycardia with occasional junctional rhythm in the setting of labetalol and diltiazem.  Given elevated lactate on presentation with improvement after IVF, suspect patient was dehydrated.     Hold labetalol and monitor on telemetry. Continue diltiazem for now.  If patient seen to have symptomatic bradycardia on monotherapy (after recent PAF), would consider holding diltiazem, but if patient seen to have tachy-maryann syndrome, will benefit from PPM.    Continue apixaban 5 mg BID for patient with age > 80 years, but weight > 60 kg and creatinine < 1.5 mg/dL.    Discharge planning per primary team.

## 2020-01-20 NOTE — PROGRESS NOTE ADULT - SUBJECTIVE AND OBJECTIVE BOX
HOLLIE STEVENS  84y Male  MRN:[id number]    Patient is a 84y old  Male who presents with a chief complaint of fall, lightheaded (2020 03:04)    HPI:  84M c hx DM2 on insulin, HTN, BPH, HLD, Afib on eliquis, frequent falls, pw fall.    At baseline, pt reports ambulating multiple blocks with a walker, and can walk up a flight of stairs. Pt states he has been in his usual state of health until yesterday evening, when as he was getting his walker in his bedroom to go down for dinner, he fell onto his right side. This fall was witnessed by the home health aide. Pt denies any pre-syncopal symptoms around the fall. However, review of EMS report shows that pt was complaining of feeling "dizzy" and "lightheaded". Pt currently denies any pain anywhere. Pt denies LOC or head trauma. This is pt's 3rd fall in the past 3 months. Pt is currently A&Ox2 at this time.    VS: Tm 98.6, P90, BP 95/60, R14, 99% RA  In the ED, received NS 2L (2020 03:04)      Patient seen and evaluated at bedside. No acute events overnight except as noted.    Interval HPI:  feels better. no c/o     PAST MEDICAL & SURGICAL HISTORY:  Diabetes mellitus type 2 in nonobese  BPH (benign prostatic hyperplasia)  Hyperlipidemia  HTN (hypertension)  H/O arthroscopy: R knee      REVIEW OF SYSTEMS:  as per hpi     VITALS:  Vital Signs Last 24 Hrs  T(C): 36.5 (2020 09:36), Max: 37.1 (2020 22:50)  T(F): 97.7 (2020 09:36), Max: 98.8 (2020 22:50)  HR: 70 (2020 09:36) (60 - 90)  BP: 115/65 (2020 09:36) (95/60 - 118/62)  BP(mean): --  RR: 18 (2020 09:36) (14 - 18)  SpO2: 97% (2020 09:36) (96% - 100%)  CAPILLARY BLOOD GLUCOSE      POCT Blood Glucose.: 194 mg/dL (2020 08:51)  POCT Blood Glucose.: 136 mg/dL (2020 03:35)    I&O's Summary      PHYSICAL EXAM:  GENERAL: NAD, well-developed  HEAD:  Atraumatic, Normocephalic  EYES: EOMI, PERRLA, conjunctiva and sclera clear  NECK: Supple, No JVD  CHEST/LUNG: Clear to auscultation bilaterally; No wheeze  HEART: S1, S2; No murmurs, rubs, or gallops  ABDOMEN: Soft, Nontender, Nondistended; Bowel sounds present  EXTREMITIES:  2+ Peripheral Pulses, No clubbing, cyanosis, or edema  PSYCH: Normal affect  NEUROLOGY: AAOX3; non-focal  SKIN: No rashes or lesions    Consultant(s) Notes Reviewed:  [x ] YES  [ ] NO  Care Discussed with Consultants/Other Providers [ x] YES  [ ] NO    MEDS:  MEDICATIONS  (STANDING):  apixaban 5 milliGRAM(s) Oral two times a day  aspirin enteric coated 81 milliGRAM(s) Oral daily  atorvastatin 40 milliGRAM(s) Oral at bedtime  dextrose 5%. 1000 milliLiter(s) (50 mL/Hr) IV Continuous <Continuous>  dextrose 50% Injectable 12.5 Gram(s) IV Push once  diltiazem    milliGRAM(s) Oral daily  insulin glargine Injectable (LANTUS) 30 Unit(s) SubCutaneous at bedtime  insulin lispro (HumaLOG) corrective regimen sliding scale   SubCutaneous three times a day before meals  insulin lispro (HumaLOG) corrective regimen sliding scale   SubCutaneous at bedtime  sodium chloride 0.9%. 1000 milliLiter(s) (200 mL/Hr) IV Continuous <Continuous>  tamsulosin 0.4 milliGRAM(s) Oral at bedtime    MEDICATIONS  (PRN):  artificial tears (preservative free) Ophthalmic Solution 1 Drop(s) Both EYES four times a day PRN Dry Eyes  dextrose 40% Gel 15 Gram(s) Oral once PRN Blood Glucose LESS THAN 70 milliGRAM(s)/deciliter  glucagon  Injectable 1 milliGRAM(s) IntraMuscular once PRN Glucose LESS THAN 70 milligrams/deciliter    ALLERGIES:  No Known Allergies      LABS:                        10.2   7.63  )-----------( 179      ( 2020 08:32 )             30.3     01-20    139  |  103  |  24<H>  ----------------------------<  151<H>  3.8   |  23  |  1.14    Ca    8.3<L>      2020 03:59  Phos  3.0       Mg     1.5         TPro  5.3<L>  /  Alb  3.1<L>  /  TBili  0.3  /  DBili  x   /  AST  17  /  ALT  21  /  AlkPhos  68  -20    PT/INR - ( 2020 03:59 )   PT: 17.7 sec;   INR: 1.53 ratio         PTT - ( 2020 20:31 )  PTT:33.6 sec      LIVER FUNCTIONS - ( 2020 03:59 )  Alb: 3.1 g/dL / Pro: 5.3 g/dL / ALK PHOS: 68 U/L / ALT: 21 U/L / AST: 17 U/L / GGT: x           Urinalysis Basic - ( 2020 22:50 )    Color: Yellow / Appearance: Clear / S.022 / pH: x  Gluc: x / Ketone: Trace  / Bili: Negative / Urobili: Negative   Blood: x / Protein: Trace / Nitrite: Negative   Leuk Esterase: Negative / RBC: 1 /hpf / WBC 3 /HPF   Sq Epi: x / Non Sq Epi: 1 /hpf / Bacteria: Negative      TSH: Thyroid Stimulating Hormone, Serum: 3.02 uIU/mL ( @ 08:44)    A1c:Hemoglobin A1C, Whole Blood: 8.4 % ( @ 08:32)     < from: CT Head No Cont (20 @ 22:04) >  IMPRESSION:   No CT evidence of acute intracranial hemorrhage, extra axial collection, mass effect or calvarial fracture.        < end of copied text >

## 2020-01-20 NOTE — H&P ADULT - PROBLEM SELECTOR PROBLEM 4
Essential hypertension Type 2 diabetes mellitus with other specified complication, with long-term current use of insulin

## 2020-01-20 NOTE — H&P ADULT - ASSESSMENT
84M c hx poss cognitive impairment, DM2 on insulin, HTN, BPH, HLD, Afib on eliquis, frequent falls, pw fall. 84M c hx DM2 on insulin, HTN, BPH, HLD, Afib on eliquis, frequent falls, pw fall.

## 2020-01-20 NOTE — H&P ADULT - PROBLEM SELECTOR PLAN 2
- PT eval  - will need to reconsider need for a/c given hx of frequent/recurrent falls - PT eval  - will need to reconsider need for a/c given hx of frequent/recurrent falls  - A&Ox2 suggesting cognitive impairment, to f/u with PMD and poss neuro as outpt  - CTH neg - poss 2/2 dehydration or metformin  - cont IVF  - no obviou s/s infection at this time  - monitor off abx

## 2020-01-20 NOTE — H&P ADULT - PROBLEM SELECTOR PLAN 3
- reduce lantus to 30U + mod ISS - PT eval  - will need to reconsider need for a/c given hx of frequent/recurrent falls  - A&Ox2 suggesting cognitive impairment, to f/u with PMD and poss neuro as outpt  - CTH neg

## 2020-01-20 NOTE — H&P ADULT - NSHPSOCIALHISTORY_GEN_ALL_CORE
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  no tobacco or alcohol  Has 1 son, 1 daughter

## 2020-01-20 NOTE — H&P ADULT - NSHPLABSRESULTS_GEN_ALL_CORE
Personally reviewed labs.   Personally reviewed imaging.   Personally reviewed EKG. NSR, rate 84, QTC . Widened QRS/fascic block. Q wave in III/V1.                           11.4   10. )-----------( 189      ( 2020 20:31 )             34.5           135  |  99  |  28<H>  ----------------------------<  211<H>  3.9   |  24  |  1.16    Ca    8.6      2020 21:49    TPro  4.5<L>  /  Alb  2.5<L>  /  TBili  0.2  /  DBili  x   /  AST  15  /  ALT  21  /  AlkPhos  59              LIVER FUNCTIONS - ( 2020 20:31 )  Alb: 2.5 g/dL / Pro: 4.5 g/dL / ALK PHOS: 59 U/L / ALT: 21 U/L / AST: 15 U/L / GGT: x             PT/INR - ( 2020 20:31 )   PT: 17.0 sec;   INR: 1.47 ratio         PTT - ( 2020 20:31 )  PTT:33.6 sec    Urinalysis Basic - ( 2020 22:50 )    Color: Yellow / Appearance: Clear / S.022 / pH: x  Gluc: x / Ketone: Trace  / Bili: Negative / Urobili: Negative   Blood: x / Protein: Trace / Nitrite: Negative   Leuk Esterase: Negative / RBC: 1 /hpf / WBC 3 /HPF   Sq Epi: x / Non Sq Epi: 1 /hpf / Bacteria: Negative

## 2020-01-20 NOTE — H&P ADULT - PROBLEM SELECTOR PLAN 1
- poss 2/2 dehydration or metformin  - cont IVF  - no obviou s/s infection at this time  - monitor off abx - tele  - IVF  - last TTE grossly normal (Dec '19)  - cont rate control med

## 2020-01-20 NOTE — H&P ADULT - HISTORY OF PRESENT ILLNESS
84M c hx poss cognitive impairment, DM2 on insulin, HTN, BPH, HLD, Afib on eliquis, frequent falls, pw fall.    At baseline, pt reports ambulating multiple blocks with a walker, and can walk up a flight of stairs. Pt states he has been in his usual state of health until yesterday evening, when as he was getting his walker in his bedroom to go down for dinner, he fell onto his right side. This fall was witnessed by the home health aide. Pt denies any pre-syncopal symptoms around the fall. Pt currently denies any pain anywhere. Pt denies LOC or head trauma. This is pt's 3rd fall in the past 3 months. Pt is currently A&Ox2 at this time.    VS: Tm 98.6, P90, BP 95/60, R14, 99% RA  In the ED, received NS 2L 84M c hx DM2 on insulin, HTN, BPH, HLD, Afib on eliquis, frequent falls, pw fall.    At baseline, pt reports ambulating multiple blocks with a walker, and can walk up a flight of stairs. Pt states he has been in his usual state of health until yesterday evening, when as he was getting his walker in his bedroom to go down for dinner, he fell onto his right side. This fall was witnessed by the home health aide. Pt denies any pre-syncopal symptoms around the fall. Pt currently denies any pain anywhere. Pt denies LOC or head trauma. This is pt's 3rd fall in the past 3 months. Pt is currently A&Ox2 at this time.    VS: Tm 98.6, P90, BP 95/60, R14, 99% RA  In the ED, received NS 2L 84M c hx DM2 on insulin, HTN, BPH, HLD, Afib on eliquis, frequent falls, pw fall.    At baseline, pt reports ambulating multiple blocks with a walker, and can walk up a flight of stairs. Pt states he has been in his usual state of health until yesterday evening, when as he was getting his walker in his bedroom to go down for dinner, he fell onto his right side. This fall was witnessed by the home health aide. Pt denies any pre-syncopal symptoms around the fall. However, review of EMS report shows that pt was complaining of feeling "dizzy" and "lightheaded". Pt currently denies any pain anywhere. Pt denies LOC or head trauma. This is pt's 3rd fall in the past 3 months. Pt is currently A&Ox2 at this time.    VS: Tm 98.6, P90, BP 95/60, R14, 99% RA  In the ED, received NS 2L

## 2020-01-20 NOTE — PROGRESS NOTE ADULT - PROBLEM SELECTOR PLAN 3
- PT eval  - will need to reconsider need for a/c given hx of frequent/recurrent falls  - A&Ox2 suggesting cognitive impairment, to f/u with PMD and poss neuro as outpt  - CTH neg

## 2020-01-20 NOTE — CHART NOTE - NSCHARTNOTEFT_GEN_A_CORE
[id number]  [facility]    Notified by RN patient with Junctional Beat HR to 40's on Telemetry     PT seen and examined in Hallway with wife at bedside . Found lying in bed on stretcher in No apparent distress. Currently reports no complaints. Found to have Junctional Rhythm on telemetry w/ HR to 40's. Denies dizziness, CP, SOB, Vision changes, n/v, abdominal pain, or back pain    ICU Vital Signs Last 24 Hrs  T(C): 36.5 (20 Jan 2020 09:36), Max: 37.1 (19 Jan 2020 22:50)  T(F): 97.7 (20 Jan 2020 09:36), Max: 98.8 (19 Jan 2020 22:50)  HR: 70 (20 Jan 2020 09:36) (60 - 90)  BP: 115/65 (20 Jan 2020 09:36) (95/60 - 118/62)  BP(mean): --  ABP: --  ABP(mean): --  RR: 18 (20 Jan 2020 09:36) (14 - 18)  SpO2: 97% (20 Jan 2020 09:36) (96% - 100%)    PE:   General         HEENT          Lungs         CV          Abd         Ext          Neuro    HPI:  84M c hx DM2 on insulin, HTN, BPH, HLD, Afib on eliquis, frequent falls, pw fall.    At baseline, pt reports ambulating multiple blocks with a walker, and can walk up a flight of stairs. Pt states he has been in his usual state of health until yesterday evening, when as he was getting his walker in his bedroom to go down for dinner, he fell onto his right side. This fall was witnessed by the home health aide. Pt denies any pre-syncopal symptoms around the fall. However, review of EMS report shows that pt was complaining of feeling "dizzy" and "lightheaded". Pt currently denies any pain anywhere. Pt denies LOC or head trauma. This is pt's 3rd fall in the past 3 months. Pt is currently A&Ox2 at this time.    Plan:  Junctional escape beats to 40's on Telemetry while asymptomatic   - Hold BB and CCB for now   - Stat ECG   -R2 pads/atropine at bedside   -Orthostatic VS negative   - [id number]  [facility]    Notified by RN patient with Junctional Beat HR to 40's on Telemetry     PT seen and examined in Hallway with wife at bedside . Found lying in bed on stretcher in No apparent distress. Currently reports no complaints. Found to have Junctional Rhythm on telemetry w/ HR to 40's. Denies dizziness, CP, SOB, Vision changes, n/v, abdominal pain, or back pain    ICU Vital Signs Last 24 Hrs  T(C): 36.5 (20 Jan 2020 09:36), Max: 37.1 (19 Jan 2020 22:50)  T(F): 97.7 (20 Jan 2020 09:36), Max: 98.8 (19 Jan 2020 22:50)  HR: 70 (20 Jan 2020 09:36) (60 - 90)  BP: 115/65 (20 Jan 2020 09:36) (95/60 - 118/62)  BP(mean): --  ABP: --  ABP(mean): --  RR: 18 (20 Jan 2020 09:36) (14 - 18)  SpO2: 97% (20 Jan 2020 09:36) (96% - 100%)    PE:   General         HEENT          Lungs         CV          Abd         Ext          Neuro    HPI:  84M c hx DM2 on insulin, HTN, BPH, HLD, Afib on eliquis, frequent falls, pw fall.    At baseline, pt reports ambulating multiple blocks with a walker, and can walk up a flight of stairs. Pt states he has been in his usual state of health until yesterday evening, when as he was getting his walker in his bedroom to go down for dinner, he fell onto his right side. This fall was witnessed by the home health aide. Pt denies any pre-syncopal symptoms around the fall. However, review of EMS report shows that pt was complaining of feeling "dizzy" and "lightheaded". Pt currently denies any pain anywhere. Pt denies LOC or head trauma. This is pt's 3rd fall in the past 3 months. Pt is currently A&Ox2 at this time.    Plan:  Junctional escape beats to 40's on Telemetry while asymptomatic   - Hold BB and CCB for now   - Stat ECG   -R2 pads/atropine at bedside   -Orthostatic VS negative   -obtain cards eval   -Monitor electrolytes closely   - Attending paged awaiting call back [phone]  [facility]    Notified by RN patient with Junctional Beat HR to 40's on Telemetry     PT seen and examined in Hallway with wife at bedside . Found lying in bed on stretcher in No apparent distress. Currently reports no complaints. Found to have Junctional Rhythm on telemetry w/ HR to 40's. Denies dizziness, CP, SOB, Vision changes, n/v, abdominal pain, or back pain    ICU Vital Signs Last 24 Hrs  T(C): 36.5 (20 Jan 2020 09:36), Max: 37.1 (19 Jan 2020 22:50)  T(F): 97.7 (20 Jan 2020 09:36), Max: 98.8 (19 Jan 2020 22:50)  HR: 70 (20 Jan 2020 09:36) (60 - 90)  BP: 115/65 (20 Jan 2020 09:36) (95/60 - 118/62)  BP(mean): --  ABP: --  ABP(mean): --  RR: 18 (20 Jan 2020 09:36) (14 - 18)  SpO2: 97% (20 Jan 2020 09:36) (96% - 100%)    PE:   General- WD/WN in NAD, AAO          HEENT- NC/AT, suppler         Lungs- CTA          CV-S1S2, No murmurs          Abd- Soft NT/ND          Ext-no c/c/e          Neuro- non-focal exam     HPI:  84M c hx DM2 on insulin, HTN, BPH, HLD, Afib on eliquis, frequent falls, pw fall.    At baseline, pt reports ambulating multiple blocks with a walker, and can walk up a flight of stairs. Pt states he has been in his usual state of health until yesterday evening, when as he was getting his walker in his bedroom to go down for dinner, he fell onto his right side. This fall was witnessed by the home health aide. Pt denies any pre-syncopal symptoms around the fall. However, review of EMS report shows that pt was complaining of feeling "dizzy" and "lightheaded". Pt currently denies any pain anywhere. Pt denies LOC or head trauma. This is pt's 3rd fall in the past 3 months. Pt is currently A&Ox2 at this time.    Plan:  Junctional escape beats to 40's on Telemetry while asymptomatic   - Hold BB and CCB for now   - Stat ECG   -R2 pads/atropine at bedside   -Orthostatic VS negative   -obtain cards eval   -Monitor electrolytes closely   - Attending paged awaiting call back

## 2020-01-20 NOTE — PROGRESS NOTE ADULT - PROBLEM SELECTOR PLAN 1
- tele  - IVF  - last TTE grossly normal (Dec '19)  - cont rate control med cardizem  hold beta blocker for now as bp acceptable  check orthsotatics  cards f/u

## 2020-01-20 NOTE — PROGRESS NOTE ADULT - ATTENDING COMMENTS
Advanced care planning was discussed with patient and family.  Advanced care planning forms were reviewed and discussed as appropriate.  Differential diagnosis and plan of care discussed with patient after the evaluation.   Pain assessed and judicious use of narcotics when appropriate was discussed.  Importance of Fall prevention discussed.  Counseling on Smoking and Alcohol cessation was offered when appropriate.  Counseling on Diet, exercise, and medication compliance was done.   Approx 18 minutes spent.

## 2020-01-20 NOTE — H&P ADULT - NSHPREVIEWOFSYSTEMS_GEN_ALL_CORE
REVIEW OF SYSTEMS:  CONSTITUTIONAL: No weakness. No fevers. No chills. No rigors. No weight loss. No night sweats. No poor appetite.  EYES: No visual changes. No eye pain.  ENT: No hearing difficulty. No vertigo. No dysphagia. No sore throat. No Sinusitis/rhinorrhea.   NECK: No pain. No stiffness/rigidity.  CARDIAC: No chest pain. No palpitations. No lightheadedness.  RESPIRATORY: No cough. No SOB. No hemoptysis.  GASTROINTESTINAL: No abdominal pain. No nausea. No vomiting. No hematemesis. No diarrhea. No constipation. No melena. No hematochezia.  GENITOURINARY: No dysuria. No frequency. No hesitancy. No hematuria. No oliguria.  NEUROLOGICAL: No numbness/tingling. No focal weakness. No urinary or fecal incontinence. No headache. No unsteady gait.  BACK: No back pain. No flank pain.  EXTREMITIES: No lower extremity edema. Full ROM. No joint pain.  SKIN: No rashes. No itching. No other lesions.  PSYCHIATRIC: No depression. No anxiety. No SI/HI.  ALLERGIC: No lip swelling. No hives.  All other review of systems is negative unless indicated above.  Unless indicated above, unable to assess ROS 2/2

## 2020-01-20 NOTE — H&P ADULT - PROBLEM SELECTOR PROBLEM 3
Type 2 diabetes mellitus with other specified complication, with long-term current use of insulin Fall, initial encounter

## 2020-01-20 NOTE — H&P ADULT - NSHPPHYSICALEXAM_GEN_ALL_CORE
PHYSICAL EXAM:   GENERAL: Alert. Not confused. No acute distress. Not thin. Not cachectic. Not obese.  HEAD:  Atraumatic. Normocephalic.  EYES: EOMI. PERRLA. Normal conjunctiva/sclera.  ENT: Neck supple. No JVD. Moist oral mucosa. Not edentulous. No thrush.  LYMPH: Normal supraclavicular/cervical lymph nodes.   CARDIAC: Not tachy, Not maryann. Regular rhythm. Not irregularly irregular. S1. S2. No murmur. No rub. No distant heart sounds.  LUNG/CHEST: CTAB. BS equal bilaterally. No wheezes. No rales. No rhonchi.  ABDOMEN: Soft. No tenderness. No distension. No fluid wave. Normal bowel sounds.  BACK: No midline/vertebral tenderness. No flank tenderness.  VASCULAR: +2 b/l radial or ulnar pulses. Palpable DP pulses.  EXTREMITIES:  No clubbing. No cyanosis. No edema. Moving all 4.  NEUROLOGY: A&Ox2. Non-focal exam. Cranial nerves intact. Normal speech. Sensation intact.  PSYCH: Normal behavior. Normal affect.  SKIN: No jaundice. No erythema. No rash/lesion.  Vascular Access:     ICU Vital Signs Last 24 Hrs  T(C): 36.4 (20 Jan 2020 01:35), Max: 37.1 (19 Jan 2020 22:50)  T(F): 97.6 (20 Jan 2020 01:35), Max: 98.8 (19 Jan 2020 22:50)  HR: 78 (20 Jan 2020 01:35) (60 - 90)  BP: 112/58 (20 Jan 2020 01:35) (95/60 - 118/62)  BP(mean): --  ABP: --  ABP(mean): --  RR: 18 (20 Jan 2020 01:35) (14 - 18)  SpO2: 96% (20 Jan 2020 01:35) (96% - 100%)      I&O's Summary PHYSICAL EXAM:   GENERAL: Alert. Not confused. No acute distress. Not thin. Not cachectic. Not obese.  HEAD:  Atraumatic. Normocephalic.  EYES: EOMI. PERRLA. Normal conjunctiva/sclera.  ENT: Neck supple. No JVD. Moist oral mucosa. Not edentulous. No thrush.  LYMPH: Normal supraclavicular/cervical lymph nodes.   CARDIAC: Not tachy, Not maryann. Regular rhythm. Not irregularly irregular. S1. S2. No murmur. No rub. No distant heart sounds.  LUNG/CHEST: CTAB. BS equal bilaterally. No wheezes. No rales. No rhonchi.  ABDOMEN: Soft. No tenderness. No distension. No fluid wave. Normal bowel sounds.  BACK: No midline/vertebral tenderness. No flank tenderness.  VASCULAR: +2 b/l radial or ulnar pulses. Palpable DP pulses.  EXTREMITIES:  No clubbing. No cyanosis. No edema. Moving all 4.  NEUROLOGY: A&Ox2 (states it's sept 2003). Non-focal exam. Cranial nerves intact. Normal speech. Sensation intact.  PSYCH: Normal behavior. Normal affect.  SKIN: No jaundice. No erythema. No rash/lesion.  Vascular Access:     ICU Vital Signs Last 24 Hrs  T(C): 36.4 (20 Jan 2020 01:35), Max: 37.1 (19 Jan 2020 22:50)  T(F): 97.6 (20 Jan 2020 01:35), Max: 98.8 (19 Jan 2020 22:50)  HR: 78 (20 Jan 2020 01:35) (60 - 90)  BP: 112/58 (20 Jan 2020 01:35) (95/60 - 118/62)  BP(mean): --  ABP: --  ABP(mean): --  RR: 18 (20 Jan 2020 01:35) (14 - 18)  SpO2: 96% (20 Jan 2020 01:35) (96% - 100%)      I&O's Summary

## 2020-01-20 NOTE — CONSULT NOTE ADULT - SUBJECTIVE AND OBJECTIVE BOX
Patient seen and evaluated @ 2pm in [facility] ED  Chief Complaint: Fall    HPI:  84M c hx DM2 on insulin, HTN, BPH, HLD, Afib on eliquis, frequent falls, pw fall.    At baseline, pt reports ambulating multiple blocks with a walker, and can walk up a flight of stairs. Pt states he has been in his usual state of health until yesterday evening, when as he was getting his walker in his bedroom to go down for dinner, he fell onto his right side. This fall was witnessed by the home health aide. Pt denies any pre-syncopal symptoms around the fall. However, review of EMS report shows that pt was complaining of feeling "dizzy" and "lightheaded". Pt currently denies any pain anywhere. Pt denies LOC or head trauma. This is pt's 3rd fall in the past 3 months. Pt is currently A&Ox2 at this time.    VS: Tm 98.6, P90, BP 95/60, R14, 99% RA  In the ED, received NS 2L (20 Jan 2020 03:04)    PMH:   Diabetes mellitus type 2 in nonobese  BPH (benign prostatic hyperplasia)  Hyperlipidemia  HTN (hypertension)    PSH:   H/O arthroscopy  No significant past surgical history    Home Medications:  Aspirin Enteric Coated 81 mg oral delayed release tablet: 1 tab(s) orally once a day (20 Jan 2020 08:17)  cholecalciferol oral tablet: 2000 unit(s) orally once a day (20 Jan 2020 08:17)  Crestor 10 mg oral tablet: 1 tab(s) orally once a day (20 Jan 2020 08:17)  insulin lispro (concentrated) 200 units/mL subcutaneous solution: 25 unit(s) subcutaneous 3 times a day (before meals) (20 Jan 2020 08:17)  labetalol 100 mg oral tablet: tab(s) orally 2 times a day (20 Jan 2020 08:17)  metFORMIN 500 mg oral tablet: 1 tab(s) orally 2 times a day (20 Jan 2020 08:17)  Multiple Vitamins with Minerals oral tablet: 1 tab(s) orally once a day (20 Jan 2020 08:17)  ocular lubricant ophthalmic solution: 1 drop(s) to each affected eye 4 times a day, As needed, Dry Eyes (20 Jan 2020 08:17)  tamsulosin 0.4 mg oral capsule: 1 cap(s) orally once a day (at bedtime) (20 Jan 2020 08:17)  Toujeo SoloStar 300 units/mL subcutaneous solution: 70 unit(s) subcutaneous once a day (at bedtime) (20 Jan 2020 08:17)      Medications:   apixaban 5 milliGRAM(s) Oral two times a day  artificial tears (preservative free) Ophthalmic Solution 1 Drop(s) Both EYES four times a day PRN  aspirin enteric coated 81 milliGRAM(s) Oral daily  atorvastatin 40 milliGRAM(s) Oral at bedtime  dextrose 40% Gel 15 Gram(s) Oral once PRN  dextrose 5%. 1000 milliLiter(s) IV Continuous <Continuous>  dextrose 50% Injectable 12.5 Gram(s) IV Push once  diltiazem    milliGRAM(s) Oral daily  glucagon  Injectable 1 milliGRAM(s) IntraMuscular once PRN  insulin glargine Injectable (LANTUS) 30 Unit(s) SubCutaneous at bedtime  insulin lispro (HumaLOG) corrective regimen sliding scale   SubCutaneous three times a day before meals  insulin lispro (HumaLOG) corrective regimen sliding scale   SubCutaneous at bedtime  sodium chloride 0.9%. 1000 milliLiter(s) IV Continuous <Continuous>  tamsulosin 0.4 milliGRAM(s) Oral at bedtime    Allergies:  No Known Allergies    FAMILY HISTORY:  FH: type 2 diabetes (Sibling): sister    Social History: , lives with wife; retired noodle factory owner  Smoking: nonsmoker  Alcohol: no EtOH abuse  Drugs: no illicit drug use    Review of Systems:    Constitutional: No fever, chills, fatigue, or changes in weight  HEENT: No blurry vision, eye pain, headache, runny nose, or sore throat  Respiratory: No shortness of breath, cough, or wheezing  Cardiovascular: No chest pain or palpitations  Gastrointestinal: No nausea, vomiting, diarrhea, or abdominal pain  Genitourinary: No dysuria or incontinence  Extremities: No lower extremity swelling  Neurologic: No focal findings  Lymphatic: No lymphadenopathy   Skin: No rash  Psychiatry: No anxiety or depression  10 point review of systems is otherwise negative except as mentioned above            Physical Exam:  T(C): 36.5 (01-20-20 @ 09:36), Max: 37.1 (01-19-20 @ 22:50)  HR: 70 (01-20-20 @ 09:36) (60 - 90)  BP: 115/65 (01-20-20 @ 09:36) (95/60 - 118/62)  RR: 18 (01-20-20 @ 09:36) (14 - 18)  SpO2: 97% (01-20-20 @ 09:36) (96% - 100%)  Wt(kg): --    Daily Height in cm: 175.26 (19 Jan 2020 19:39)    Daily     Appearance: Normal, well groomed, NAD  Eyes: PERRLA, EOMI, pink conjunctiva, no scleral icterus   HENT: Normal oral mucosa  Cardiovascular: RRR, S1, S2, no murmur, rub, or gallop; no edema; no JVD  Respiratory: Clear to auscultation bilaterally  Gastrointestinal: Soft, non-tender, non-distended, BS+  Musculoskeletal: No clubbing or joint deformity   Neurologic: No focal weakness  Lymphatic: No lymphadenopathy  Psychiatry: AAOx3 with appropriate mood and affect  Skin: No rashes, ecchymoses, or cyanosis    Cardiovascular Diagnostic Testing:  ECG: sinus RBBB    Echo: < from: TTE with Doppler (w/Cont) (12.20.19 @ 08:15) >  ------------------------------------------------------------------------  Dimensions:    Normal Values:  LA:     2.7    2.0 - 4.0 cm  Ao:            2.0 - 3.8 cm  SEPTUM: 0.9    0.6 - 1.2 cm  PWT:    0.8  0.6 - 1.1 cm  LVIDd:  3.3    3.0 - 5.6 cm  LVIDs:  2.4    1.8 - 4.0 cm  Derived variables:  LVMI: 40 g/m2  RWT: 0.47  EF (Visual Estimate): 65 %  Doppler Peak Velocity (m/sec): MV=1.7 AoV=1.3  ------------------------------------------------------------------------  Observations:  Mitral Valve: Mitral annular calcification. Calcified  mitral leaflets.  Mean mitral gradient 2.0 mmHg at 89/min.  Aortic Valve/Aorta: Calcified aortic valve with normal  opening.  Mild aortic regurgitation.  Normal aortic root.  Left Atrium: Mild left atrial enlargement.  Left Ventricle: Endocardial visualization enhanced with  intravenous injection of Ultrasonic Enhancing Agent  (Definity).  Normal left ventricular internal dimensions and wall  thicknesses.  Normal left ventricular systolic function. No segmental  wall motion abnormalities.  Right Heart: Normal right atrium. Normal right ventricular  size and systolic function. Normal tricuspid valve. Minimal  tricuspid regurgitation. Normal pulmonic valve.  Pericardium/Pleura: Normal pericardium with no pericardial  effusion.  Hemodynamic: No evidence of pulmonary hypertension.  No PFO seen with color Doppler.  ------------------------------------------------------------------------  Conclusions:  Endocardial visualization enhanced with intravenous  injection of Ultrasonic Enhancing Agent (Definity).  Normal left ventricular systolic function. No segmental  wall motion abnormalities.  ------------------------------------------------------------------------  Confirmed on  12/20/2019 - 14:28:17 by Chriss Rowland M.D.  ------------------------------------------------------------------------    < end of copied text >    Interpretation of Telemetry: NSR with sinus bradycardia and junctional rhythm     Labs:                        10.2   7.63  )-----------( 179      ( 20 Jan 2020 08:32 )             30.3     01-20    139  |  103  |  24<H>  ----------------------------<  151<H>  3.8   |  23  |  1.14    Ca    8.3<L>      20 Jan 2020 03:59  Phos  3.0     01-20  Mg     1.5     01-20    TPro  5.3<L>  /  Alb  3.1<L>  /  TBili  0.3  /  DBili  x   /  AST  17  /  ALT  21  /  AlkPhos  68  01-20    PT/INR - ( 20 Jan 2020 03:59 )   PT: 17.7 sec;   INR: 1.53 ratio         PTT - ( 19 Jan 2020 20:31 )  PTT:33.6 sec          Hemoglobin A1C, Whole Blood: 8.4 % (01-20 @ 08:32)    Thyroid Stimulating Hormone, Serum: 3.02 uIU/mL (01-20 @ 08:44)

## 2020-01-21 LAB
ANION GAP SERPL CALC-SCNC: 13 MMOL/L — SIGNIFICANT CHANGE UP (ref 5–17)
BUN SERPL-MCNC: 16 MG/DL — SIGNIFICANT CHANGE UP (ref 7–23)
CALCIUM SERPL-MCNC: 8.7 MG/DL — SIGNIFICANT CHANGE UP (ref 8.4–10.5)
CHLORIDE SERPL-SCNC: 104 MMOL/L — SIGNIFICANT CHANGE UP (ref 96–108)
CO2 SERPL-SCNC: 22 MMOL/L — SIGNIFICANT CHANGE UP (ref 22–31)
CREAT SERPL-MCNC: 0.96 MG/DL — SIGNIFICANT CHANGE UP (ref 0.5–1.3)
CULTURE RESULTS: SIGNIFICANT CHANGE UP
GLUCOSE BLDC GLUCOMTR-MCNC: 230 MG/DL — HIGH (ref 70–99)
GLUCOSE BLDC GLUCOMTR-MCNC: 261 MG/DL — HIGH (ref 70–99)
GLUCOSE BLDC GLUCOMTR-MCNC: 263 MG/DL — HIGH (ref 70–99)
GLUCOSE BLDC GLUCOMTR-MCNC: 351 MG/DL — HIGH (ref 70–99)
GLUCOSE SERPL-MCNC: 214 MG/DL — HIGH (ref 70–99)
HCT VFR BLD CALC: 35.2 % — LOW (ref 39–50)
HGB BLD-MCNC: 11.6 G/DL — LOW (ref 13–17)
MAGNESIUM SERPL-MCNC: 1.6 MG/DL — SIGNIFICANT CHANGE UP (ref 1.6–2.6)
MCHC RBC-ENTMCNC: 30.9 PG — SIGNIFICANT CHANGE UP (ref 27–34)
MCHC RBC-ENTMCNC: 33 GM/DL — SIGNIFICANT CHANGE UP (ref 32–36)
MCV RBC AUTO: 93.9 FL — SIGNIFICANT CHANGE UP (ref 80–100)
PHOSPHATE SERPL-MCNC: 2.7 MG/DL — SIGNIFICANT CHANGE UP (ref 2.5–4.5)
PLATELET # BLD AUTO: 200 K/UL — SIGNIFICANT CHANGE UP (ref 150–400)
POTASSIUM SERPL-MCNC: 3.4 MMOL/L — LOW (ref 3.5–5.3)
POTASSIUM SERPL-SCNC: 3.4 MMOL/L — LOW (ref 3.5–5.3)
RBC # BLD: 3.75 M/UL — LOW (ref 4.2–5.8)
RBC # FLD: 12.7 % — SIGNIFICANT CHANGE UP (ref 10.3–14.5)
SODIUM SERPL-SCNC: 139 MMOL/L — SIGNIFICANT CHANGE UP (ref 135–145)
SPECIMEN SOURCE: SIGNIFICANT CHANGE UP
WBC # BLD: 8 K/UL — SIGNIFICANT CHANGE UP (ref 3.8–10.5)
WBC # FLD AUTO: 8 K/UL — SIGNIFICANT CHANGE UP (ref 3.8–10.5)

## 2020-01-21 PROCEDURE — 93010 ELECTROCARDIOGRAM REPORT: CPT

## 2020-01-21 PROCEDURE — 99233 SBSQ HOSP IP/OBS HIGH 50: CPT

## 2020-01-21 RX ORDER — INSULIN GLARGINE 100 [IU]/ML
40 INJECTION, SOLUTION SUBCUTANEOUS AT BEDTIME
Refills: 0 | Status: DISCONTINUED | OUTPATIENT
Start: 2020-01-21 | End: 2020-01-22

## 2020-01-21 RX ORDER — POTASSIUM CHLORIDE 20 MEQ
20 PACKET (EA) ORAL ONCE
Refills: 0 | Status: COMPLETED | OUTPATIENT
Start: 2020-01-21 | End: 2020-01-21

## 2020-01-21 RX ORDER — MAGNESIUM SULFATE 500 MG/ML
1 VIAL (ML) INJECTION ONCE
Refills: 0 | Status: COMPLETED | OUTPATIENT
Start: 2020-01-21 | End: 2020-01-21

## 2020-01-21 RX ORDER — POTASSIUM CHLORIDE 20 MEQ
40 PACKET (EA) ORAL ONCE
Refills: 0 | Status: COMPLETED | OUTPATIENT
Start: 2020-01-21 | End: 2020-01-21

## 2020-01-21 RX ADMIN — Medication 6: at 16:49

## 2020-01-21 RX ADMIN — Medication 81 MILLIGRAM(S): at 11:31

## 2020-01-21 RX ADMIN — ATORVASTATIN CALCIUM 40 MILLIGRAM(S): 80 TABLET, FILM COATED ORAL at 21:55

## 2020-01-21 RX ADMIN — APIXABAN 5 MILLIGRAM(S): 2.5 TABLET, FILM COATED ORAL at 17:16

## 2020-01-21 RX ADMIN — Medication 20 MILLIEQUIVALENT(S): at 16:50

## 2020-01-21 RX ADMIN — Medication 180 MILLIGRAM(S): at 05:54

## 2020-01-21 RX ADMIN — APIXABAN 5 MILLIGRAM(S): 2.5 TABLET, FILM COATED ORAL at 05:54

## 2020-01-21 RX ADMIN — INSULIN GLARGINE 40 UNIT(S): 100 INJECTION, SOLUTION SUBCUTANEOUS at 21:55

## 2020-01-21 RX ADMIN — Medication 40 MILLIEQUIVALENT(S): at 08:44

## 2020-01-21 RX ADMIN — Medication 4: at 07:40

## 2020-01-21 RX ADMIN — TAMSULOSIN HYDROCHLORIDE 0.4 MILLIGRAM(S): 0.4 CAPSULE ORAL at 21:55

## 2020-01-21 RX ADMIN — Medication 3: at 21:55

## 2020-01-21 RX ADMIN — Medication 6: at 11:41

## 2020-01-21 RX ADMIN — Medication 100 GRAM(S): at 08:44

## 2020-01-21 NOTE — PHYSICAL THERAPY INITIAL EVALUATION ADULT - GAIT DEVIATIONS NOTED, PT EVAL
decreased velocity of limb motion/increased time in double stance/decreased fouzia/decreased stride length/decreased weight-shifting ability

## 2020-01-21 NOTE — PHYSICAL THERAPY INITIAL EVALUATION ADULT - PLANNED THERAPY INTERVENTIONS, PT EVAL
bed mobility training/gait training/balance training/transfer training/GOAL: Pt will negotiate 10 steps with 1 HR and step to pattern independently in 4 weeks.

## 2020-01-21 NOTE — PROGRESS NOTE ADULT - SUBJECTIVE AND OBJECTIVE BOX
Patient is a 84y old  Male who presents with a chief complaint of fall, lightheaded (2020 16:06)    SUBJECTIVE / OVERNIGHT EVENTS: overnight events noted    ROS:  Resp: No cough no sputum production  CVS: No chest pain no palpitations no orthopnea  GI: no N/V/D  : no dysuria, no hematuria  Neuro: no weakness no paresthesias  Heme: No petechiae no easy bruising  Msk: No joint pain no swelling  Skin: No rash no itching        MEDICATIONS  (STANDING):  apixaban 5 milliGRAM(s) Oral two times a day  aspirin enteric coated 81 milliGRAM(s) Oral daily  atorvastatin 40 milliGRAM(s) Oral at bedtime  dextrose 5%. 1000 milliLiter(s) (50 mL/Hr) IV Continuous <Continuous>  dextrose 50% Injectable 12.5 Gram(s) IV Push once  diltiazem    milliGRAM(s) Oral daily  insulin glargine Injectable (LANTUS) 30 Unit(s) SubCutaneous at bedtime  insulin lispro (HumaLOG) corrective regimen sliding scale   SubCutaneous three times a day before meals  insulin lispro (HumaLOG) corrective regimen sliding scale   SubCutaneous at bedtime  tamsulosin 0.4 milliGRAM(s) Oral at bedtime    MEDICATIONS  (PRN):  artificial tears (preservative free) Ophthalmic Solution 1 Drop(s) Both EYES four times a day PRN Dry Eyes  dextrose 40% Gel 15 Gram(s) Oral once PRN Blood Glucose LESS THAN 70 milliGRAM(s)/deciliter  glucagon  Injectable 1 milliGRAM(s) IntraMuscular once PRN Glucose LESS THAN 70 milligrams/deciliter        CAPILLARY BLOOD GLUCOSE      POCT Blood Glucose.: 261 mg/dL (2020 11:37)  POCT Blood Glucose.: 230 mg/dL (2020 07:27)  POCT Blood Glucose.: 271 mg/dL (2020 21:38)  POCT Blood Glucose.: 243 mg/dL (2020 16:32)  POCT Blood Glucose.: 223 mg/dL (2020 13:11)    I&O's Summary    2020 07:01  -  2020 07:00  --------------------------------------------------------  IN: 480 mL / OUT: 140 mL / NET: 340 mL    2020 07:01  -  2020 12:08  --------------------------------------------------------  IN: 0 mL / OUT: 150 mL / NET: -150 mL        Vital Signs Last 24 Hrs  T(C): 36.4 (2020 11:37), Max: 36.8 (2020 04:23)  T(F): 97.5 (2020 11:37), Max: 98.3 (2020 04:23)  HR: 73 (2020 11:37) (73 - 90)  BP: 116/71 (2020 11:37) (116/71 - 153/78)  BP(mean): --  RR: 18 (2020 11:37) (18 - 18)  SpO2: 98% (2020 11:37) (96% - 100%)    PHYSICAL EXAM:  GENERAL: NAD, well-developed  HEAD:  Atraumatic, Normocephalic  EYES: EOMI, PERRLA, conjunctiva and sclera clear  NECK: Supple, No JVD  CHEST/LUNG: Clear to auscultation bilaterally; No wheeze  HEART: S1, S2; No murmurs, rubs, or gallops  ABDOMEN: Soft, Nontender, Nondistended; Bowel sounds present  EXTREMITIES:  2+ Peripheral Pulses, No clubbing, cyanosis, or edema  PSYCH: Normal affect  NEUROLOGY: AAOX3; non-focal  SKIN: No rashes or lesions    LABS:                        11.6   8.00  )-----------( 200      ( 2020 07:29 )             35.2     -21    139  |  104  |  16  ----------------------------<  214<H>  3.4<L>   |  22  |  0.96    Ca    8.7      2020 06:11  Phos  2.7       Mg     1.6         TPro  5.3<L>  /  Alb  3.1<L>  /  TBili  0.3  /  DBili  x   /  AST  17  /  ALT  21  /  AlkPhos  68  01-20    PT/INR - ( 2020 03:59 )   PT: 17.7 sec;   INR: 1.53 ratio         PTT - ( 2020 20:31 )  PTT:33.6 sec      Urinalysis Basic - ( 2020 22:50 )    Color: Yellow / Appearance: Clear / S.022 / pH: x  Gluc: x / Ketone: Trace  / Bili: Negative / Urobili: Negative   Blood: x / Protein: Trace / Nitrite: Negative   Leuk Esterase: Negative / RBC: 1 /hpf / WBC 3 /HPF   Sq Epi: x / Non Sq Epi: 1 /hpf / Bacteria: Negative          All consultant(s) notes reviewed and care discussed with other providers        Contact Number, Dr Babin [phone]

## 2020-01-21 NOTE — PROGRESS NOTE ADULT - PROBLEM/PLAN-4
Received ambulatory to room 3416 for NST due to IUGR. Denies headache, dizziness, blurred vision. Denies ctx, vaginal leakage, bleeding admits to fetal movement.  EFM applied DISPLAY PLAN FREE TEXT

## 2020-01-21 NOTE — PHYSICAL THERAPY INITIAL EVALUATION ADULT - PERTINENT HX OF CURRENT PROBLEM, REHAB EVAL
83 yo F recently diagnosed a fib on eliquis s/p witnessed fall while ambulating onto right side on his shoulder without head trauma/LOC. As per pt, his "legs gave out." Pt denies any pre-syncopal symptoms around the fall. However, review of EMS report shows c/o feeling "dizzy" and "lightheaded". Also found to have sinus bradycardia with occasional junctional rhythm in the setting of labetalol and diltiazem. CT Head 1/19: Neg.

## 2020-01-21 NOTE — PROVIDER CONTACT NOTE (OTHER) - ASSESSMENT
Admission Reconciliation is Completed  Discharge Reconciliation is Completed Pt is stable  Resting in bed  VSS  No complaints of pain or discomfort

## 2020-01-21 NOTE — PHYSICAL THERAPY INITIAL EVALUATION ADULT - IMPAIRMENTS CONTRIBUTING IMPAIRED BED MOBILITY, REHAB EVAL
impaired postural control/decreased strength/impaired balance/impaired coordination/decreased flexibility

## 2020-01-21 NOTE — PHYSICAL THERAPY INITIAL EVALUATION ADULT - ADDITIONAL COMMENTS
pt lives in private home "a lot" of steps to enter, but is looking to get a chair lift. Pt has HHA for functional mobility, however did fall with HHA present (was walking behind pt). Pt uses a rolling walker at all times.

## 2020-01-21 NOTE — PROGRESS NOTE ADULT - SUBJECTIVE AND OBJECTIVE BOX
HPI:  Patient seen and examined at bedside.  PAF, but no further bradycardia seen overnight.    Review Of Systems:           Respiratory: No shortness of breath, cough, or wheezing  Cardiovascular: No chest pain or palpitations  10 point review of systems is otherwise negative except as mentioned above        Medications:  apixaban 5 milliGRAM(s) Oral two times a day  artificial tears (preservative free) Ophthalmic Solution 1 Drop(s) Both EYES four times a day PRN  aspirin enteric coated 81 milliGRAM(s) Oral daily  atorvastatin 40 milliGRAM(s) Oral at bedtime  dextrose 40% Gel 15 Gram(s) Oral once PRN  dextrose 5%. 1000 milliLiter(s) IV Continuous <Continuous>  dextrose 50% Injectable 12.5 Gram(s) IV Push once  diltiazem    milliGRAM(s) Oral daily  glucagon  Injectable 1 milliGRAM(s) IntraMuscular once PRN  insulin glargine Injectable (LANTUS) 40 Unit(s) SubCutaneous at bedtime  insulin lispro (HumaLOG) corrective regimen sliding scale   SubCutaneous three times a day before meals  insulin lispro (HumaLOG) corrective regimen sliding scale   SubCutaneous at bedtime  tamsulosin 0.4 milliGRAM(s) Oral at bedtime    PAST MEDICAL & SURGICAL HISTORY:  Diabetes mellitus type 2 in nonobese  BPH (benign prostatic hyperplasia)  Hyperlipidemia  HTN (hypertension)  H/O arthroscopy: R knee    Vitals:  T(C): 36.4 (01-21-20 @ 11:37), Max: 36.8 (01-21-20 @ 04:23)  HR: 73 (01-21-20 @ 11:37) (73 - 90)  BP: 116/71 (01-21-20 @ 11:37) (116/71 - 127/73)  BP(mean): --  RR: 18 (01-21-20 @ 11:37) (18 - 18)  SpO2: 98% (01-21-20 @ 11:37) (96% - 100%)  Wt(kg): --  Daily     Daily   I&O's Summary    20 Jan 2020 07:01  -  21 Jan 2020 07:00  --------------------------------------------------------  IN: 480 mL / OUT: 140 mL / NET: 340 mL    21 Jan 2020 07:01  -  21 Jan 2020 19:38  --------------------------------------------------------  IN: 0 mL / OUT: 150 mL / NET: -150 mL        Physical Exam:  Appearance: Normal, well groomed, NAD  Eyes: PERRLA, EOMI, pink conjunctiva, no scleral icterus   HENT: Normal oral mucosa  Cardiovascular: RRR, S1, S2, no murmur, rub, or gallop; no edema; no JVD  Respiratory: Clear to auscultation bilaterally  Gastrointestinal: Soft, non-tender, non-distended, BS+  Musculoskeletal: No clubbing or joint deformity   Neurologic: No focal weakness  Lymphatic: No lymphadenopathy  Psychiatry: AAOx3 with appropriate mood and affect  Skin: No rashes, ecchymoses, or cyanosis                          11.6   8.00  )-----------( 200      ( 21 Jan 2020 07:29 )             35.2     01-21    139  |  104  |  16  ----------------------------<  214<H>  3.4<L>   |  22  |  0.96    Ca    8.7      21 Jan 2020 06:11  Phos  2.7     01-21  Mg     1.6     01-21    TPro  5.3<L>  /  Alb  3.1<L>  /  TBili  0.3  /  DBili  x   /  AST  17  /  ALT  21  /  AlkPhos  68  01-20    PT/INR - ( 20 Jan 2020 03:59 )   PT: 17.7 sec;   INR: 1.53 ratio         PTT - ( 19 Jan 2020 20:31 )  PTT:33.6 sec        Cardiovascular Diagnostic Testing:  ECG: sinus RBBB    Echo: < from: TTE with Doppler (w/Cont) (12.20.19 @ 08:15) >  ------------------------------------------------------------------------  Dimensions:    Normal Values:  LA:     2.7    2.0 - 4.0 cm  Ao:            2.0 - 3.8 cm  SEPTUM: 0.9    0.6 - 1.2 cm  PWT:    0.8  0.6 - 1.1 cm  LVIDd:  3.3    3.0 - 5.6 cm  LVIDs:  2.4    1.8 - 4.0 cm  Derived variables:  LVMI: 40 g/m2  RWT: 0.47  EF (Visual Estimate): 65 %  Doppler Peak Velocity (m/sec): MV=1.7 AoV=1.3  ------------------------------------------------------------------------  Observations:  Mitral Valve: Mitral annular calcification. Calcified  mitral leaflets.  Mean mitral gradient 2.0 mmHg at 89/min.  Aortic Valve/Aorta: Calcified aortic valve with normal  opening.  Mild aortic regurgitation.  Normal aortic root.  Left Atrium: Mild left atrial enlargement.  Left Ventricle: Endocardial visualization enhanced with  intravenous injection of Ultrasonic Enhancing Agent  (Definity).  Normal left ventricular internal dimensions and wall  thicknesses.  Normal left ventricular systolic function. No segmental  wall motion abnormalities.  Right Heart: Normal right atrium. Normal right ventricular  size and systolic function. Normal tricuspid valve. Minimal  tricuspid regurgitation. Normal pulmonic valve.  Pericardium/Pleura: Normal pericardium with no pericardial  effusion.  Hemodynamic: No evidence of pulmonary hypertension.  No PFO seen with color Doppler.  ------------------------------------------------------------------------  Conclusions:  Endocardial visualization enhanced with intravenous  injection of Ultrasonic Enhancing Agent (Definity).  Normal left ventricular systolic function. No segmental  wall motion abnormalities.  ------------------------------------------------------------------------  Confirmed on  12/20/2019 - 14:28:17 by Chriss Rowland M.D.  ------------------------------------------------------------------------    < end of copied text >    Interpretation of Telemetry: NSR with rate controlled PAF overnight (no slower than 70 bpm, no conversion pause seen)

## 2020-01-21 NOTE — PROGRESS NOTE ADULT - PROBLEM SELECTOR PLAN 4
will increase Lantus to 40 (pt takes 70 units at home)  finger sticks 200 - 300 range  watch for hypoglycemia

## 2020-01-22 ENCOUNTER — TRANSCRIPTION ENCOUNTER (OUTPATIENT)
Age: 85
End: 2020-01-22

## 2020-01-22 VITALS
DIASTOLIC BLOOD PRESSURE: 81 MMHG | OXYGEN SATURATION: 98 % | TEMPERATURE: 98 F | RESPIRATION RATE: 18 BRPM | HEART RATE: 79 BPM | SYSTOLIC BLOOD PRESSURE: 145 MMHG

## 2020-01-22 LAB
ANION GAP SERPL CALC-SCNC: 11 MMOL/L — SIGNIFICANT CHANGE UP (ref 5–17)
BUN SERPL-MCNC: 16 MG/DL — SIGNIFICANT CHANGE UP (ref 7–23)
CALCIUM SERPL-MCNC: 8.9 MG/DL — SIGNIFICANT CHANGE UP (ref 8.4–10.5)
CHLORIDE SERPL-SCNC: 106 MMOL/L — SIGNIFICANT CHANGE UP (ref 96–108)
CO2 SERPL-SCNC: 24 MMOL/L — SIGNIFICANT CHANGE UP (ref 22–31)
CREAT SERPL-MCNC: 0.88 MG/DL — SIGNIFICANT CHANGE UP (ref 0.5–1.3)
GLUCOSE BLDC GLUCOMTR-MCNC: 229 MG/DL — HIGH (ref 70–99)
GLUCOSE BLDC GLUCOMTR-MCNC: 240 MG/DL — HIGH (ref 70–99)
GLUCOSE BLDC GLUCOMTR-MCNC: 267 MG/DL — HIGH (ref 70–99)
GLUCOSE SERPL-MCNC: 202 MG/DL — HIGH (ref 70–99)
HCT VFR BLD CALC: 34.9 % — LOW (ref 39–50)
HGB BLD-MCNC: 11.5 G/DL — LOW (ref 13–17)
MCHC RBC-ENTMCNC: 30.4 PG — SIGNIFICANT CHANGE UP (ref 27–34)
MCHC RBC-ENTMCNC: 33 GM/DL — SIGNIFICANT CHANGE UP (ref 32–36)
MCV RBC AUTO: 92.3 FL — SIGNIFICANT CHANGE UP (ref 80–100)
PLATELET # BLD AUTO: 205 K/UL — SIGNIFICANT CHANGE UP (ref 150–400)
POTASSIUM SERPL-MCNC: 3.8 MMOL/L — SIGNIFICANT CHANGE UP (ref 3.5–5.3)
POTASSIUM SERPL-SCNC: 3.8 MMOL/L — SIGNIFICANT CHANGE UP (ref 3.5–5.3)
RBC # BLD: 3.78 M/UL — LOW (ref 4.2–5.8)
RBC # FLD: 12.8 % — SIGNIFICANT CHANGE UP (ref 10.3–14.5)
SODIUM SERPL-SCNC: 141 MMOL/L — SIGNIFICANT CHANGE UP (ref 135–145)
WBC # BLD: 7.15 K/UL — SIGNIFICANT CHANGE UP (ref 3.8–10.5)
WBC # FLD AUTO: 7.15 K/UL — SIGNIFICANT CHANGE UP (ref 3.8–10.5)

## 2020-01-22 PROCEDURE — 70450 CT HEAD/BRAIN W/O DYE: CPT

## 2020-01-22 PROCEDURE — 85730 THROMBOPLASTIN TIME PARTIAL: CPT

## 2020-01-22 PROCEDURE — 83036 HEMOGLOBIN GLYCOSYLATED A1C: CPT

## 2020-01-22 PROCEDURE — 99285 EMERGENCY DEPT VISIT HI MDM: CPT

## 2020-01-22 PROCEDURE — 82962 GLUCOSE BLOOD TEST: CPT

## 2020-01-22 PROCEDURE — 71045 X-RAY EXAM CHEST 1 VIEW: CPT

## 2020-01-22 PROCEDURE — 36415 COLL VENOUS BLD VENIPUNCTURE: CPT

## 2020-01-22 PROCEDURE — 97161 PT EVAL LOW COMPLEX 20 MIN: CPT

## 2020-01-22 PROCEDURE — 99233 SBSQ HOSP IP/OBS HIGH 50: CPT

## 2020-01-22 PROCEDURE — 80048 BASIC METABOLIC PNL TOTAL CA: CPT

## 2020-01-22 PROCEDURE — 84443 ASSAY THYROID STIM HORMONE: CPT

## 2020-01-22 PROCEDURE — 85014 HEMATOCRIT: CPT

## 2020-01-22 PROCEDURE — 81001 URINALYSIS AUTO W/SCOPE: CPT

## 2020-01-22 PROCEDURE — 87086 URINE CULTURE/COLONY COUNT: CPT

## 2020-01-22 PROCEDURE — 93005 ELECTROCARDIOGRAM TRACING: CPT

## 2020-01-22 PROCEDURE — 80053 COMPREHEN METABOLIC PANEL: CPT

## 2020-01-22 PROCEDURE — 85610 PROTHROMBIN TIME: CPT

## 2020-01-22 PROCEDURE — 85027 COMPLETE CBC AUTOMATED: CPT

## 2020-01-22 PROCEDURE — 82803 BLOOD GASES ANY COMBINATION: CPT

## 2020-01-22 PROCEDURE — 82330 ASSAY OF CALCIUM: CPT

## 2020-01-22 PROCEDURE — 83735 ASSAY OF MAGNESIUM: CPT

## 2020-01-22 PROCEDURE — 82435 ASSAY OF BLOOD CHLORIDE: CPT

## 2020-01-22 PROCEDURE — 82947 ASSAY GLUCOSE BLOOD QUANT: CPT

## 2020-01-22 PROCEDURE — 84484 ASSAY OF TROPONIN QUANT: CPT

## 2020-01-22 PROCEDURE — 83605 ASSAY OF LACTIC ACID: CPT

## 2020-01-22 PROCEDURE — 84295 ASSAY OF SERUM SODIUM: CPT

## 2020-01-22 PROCEDURE — 84100 ASSAY OF PHOSPHORUS: CPT

## 2020-01-22 PROCEDURE — 84132 ASSAY OF SERUM POTASSIUM: CPT

## 2020-01-22 RX ORDER — ATORVASTATIN CALCIUM 80 MG/1
1 TABLET, FILM COATED ORAL
Qty: 30 | Refills: 0
Start: 2020-01-22 | End: 2020-02-20

## 2020-01-22 RX ORDER — LABETALOL HCL 100 MG
0 TABLET ORAL
Qty: 0 | Refills: 0 | DISCHARGE

## 2020-01-22 RX ORDER — ROSUVASTATIN CALCIUM 5 MG/1
1 TABLET ORAL
Qty: 30 | Refills: 0
Start: 2020-01-22 | End: 2020-02-20

## 2020-01-22 RX ORDER — ROSUVASTATIN CALCIUM 5 MG/1
1 TABLET ORAL
Qty: 0 | Refills: 0 | DISCHARGE

## 2020-01-22 RX ADMIN — Medication 6: at 17:35

## 2020-01-22 RX ADMIN — Medication 81 MILLIGRAM(S): at 11:45

## 2020-01-22 RX ADMIN — APIXABAN 5 MILLIGRAM(S): 2.5 TABLET, FILM COATED ORAL at 17:35

## 2020-01-22 RX ADMIN — Medication 4: at 11:45

## 2020-01-22 RX ADMIN — Medication 4: at 07:56

## 2020-01-22 RX ADMIN — APIXABAN 5 MILLIGRAM(S): 2.5 TABLET, FILM COATED ORAL at 05:15

## 2020-01-22 RX ADMIN — Medication 180 MILLIGRAM(S): at 05:15

## 2020-01-22 RX ADMIN — Medication 1 DROP(S): at 05:16

## 2020-01-22 NOTE — DISCHARGE NOTE NURSING/CASE MANAGEMENT/SOCIAL WORK - PATIENT PORTAL LINK FT
You can access the FollowMyHealth Patient Portal offered by [facility] by registering at the following website: [email or web address]. By joining [employer]’s FollowMyHealth portal, you will also be able to view your health information using other applications (apps) compatible with our system.

## 2020-01-22 NOTE — PROGRESS NOTE ADULT - ASSESSMENT
84 year-old with history as above presents with weakness and fall, seen to have sinus bradycardia with occasional junctional rhythm in the setting of labetalol and diltiazem.  Given elevated lactate on presentation with improvement after IVF, suspect patient was dehydrated.     Hold labetalol and monitor on telemetry. Continue diltiazem.  If patient seen to have symptomatic bradycardia on monotherapy (after recent PAF), would consider holding diltiazem, but if patient seen to have tachy-maryann syndrome, will benefit from PPM.    Continue apixaban 5 mg BID for patient with age > 80 years, but weight > 60 kg and creatinine < 1.5 mg/dL.    Discharge planning per primary team.

## 2020-01-22 NOTE — PROGRESS NOTE ADULT - SUBJECTIVE AND OBJECTIVE BOX
HPI:  Patient seen and examined at bedside on [address] [place]. Patient's wife and son at bedside.  No bradycardic events seen on tele since holding labetalol.    Review Of Systems:           Respiratory: No shortness of breath, cough, or wheezing  Cardiovascular: No chest pain or palpitations  10 point review of systems is otherwise negative except as mentioned above        Medications:  apixaban 5 milliGRAM(s) Oral two times a day  artificial tears (preservative free) Ophthalmic Solution 1 Drop(s) Both EYES four times a day PRN  aspirin enteric coated 81 milliGRAM(s) Oral daily  atorvastatin 40 milliGRAM(s) Oral at bedtime  dextrose 40% Gel 15 Gram(s) Oral once PRN  dextrose 5%. 1000 milliLiter(s) IV Continuous <Continuous>  dextrose 50% Injectable 12.5 Gram(s) IV Push once  diltiazem    milliGRAM(s) Oral daily  glucagon  Injectable 1 milliGRAM(s) IntraMuscular once PRN  insulin glargine Injectable (LANTUS) 40 Unit(s) SubCutaneous at bedtime  insulin lispro (HumaLOG) corrective regimen sliding scale   SubCutaneous three times a day before meals  insulin lispro (HumaLOG) corrective regimen sliding scale   SubCutaneous at bedtime  tamsulosin 0.4 milliGRAM(s) Oral at bedtime    PAST MEDICAL & SURGICAL HISTORY:  Diabetes mellitus type 2 in nonobese  BPH (benign prostatic hyperplasia)  Hyperlipidemia  HTN (hypertension)  H/O arthroscopy: R knee    Vitals:  T(C): 36.7 (01-22-20 @ 16:50), Max: 37.1 (01-21-20 @ 20:12)  HR: 79 (01-22-20 @ 16:50) (70 - 115)  BP: 145/81 (01-22-20 @ 16:50) (142/73 - 146/70)  BP(mean): --  RR: 18 (01-22-20 @ 16:50) (18 - 18)  SpO2: 98% (01-22-20 @ 16:50) (97% - 98%)  Wt(kg): --  Daily     Daily   I&O's Summary    21 Jan 2020 07:01  -  22 Jan 2020 07:00  --------------------------------------------------------  IN: 480 mL / OUT: 250 mL / NET: 230 mL    22 Jan 2020 07:01  -  22 Jan 2020 17:44  --------------------------------------------------------  IN: 480 mL / OUT: 125 mL / NET: 355 mL        Physical Exam:  Appearance: Normal, well groomed, NAD  Eyes: PERRLA, EOMI, pink conjunctiva, no scleral icterus   HENT: Normal oral mucosa  Cardiovascular: RRR, S1, S2, no murmur, rub, or gallop; no edema; no JVD  Respiratory: Clear to auscultation bilaterally  Gastrointestinal: Soft, non-tender, non-distended, BS+  Musculoskeletal: No clubbing or joint deformity   Neurologic: No focal weakness  Lymphatic: No lymphadenopathy  Psychiatry: AAOx3 with appropriate mood and affect  Skin: No rashes, ecchymoses, or cyanosis                          11.5   7.15  )-----------( 205      ( 22 Jan 2020 07:35 )             34.9     01-22    141  |  106  |  16  ----------------------------<  202<H>  3.8   |  24  |  0.88    Ca    8.9      22 Jan 2020 06:43  Phos  2.7     01-21  Mg     1.6     01-21            Cardiovascular Diagnostic Testing:  ECG: sinus RBBB    Echo: < from: TTE with Doppler (w/Cont) (12.20.19 @ 08:15) >  ------------------------------------------------------------------------  Dimensions:    Normal Values:  LA:     2.7    2.0 - 4.0 cm  Ao:            2.0 - 3.8 cm  SEPTUM: 0.9    0.6 - 1.2 cm  PWT:    0.8  0.6 - 1.1 cm  LVIDd:  3.3    3.0 - 5.6 cm  LVIDs:  2.4    1.8 - 4.0 cm  Derived variables:  LVMI: 40 g/m2  RWT: 0.47  EF (Visual Estimate): 65 %  Doppler Peak Velocity (m/sec): MV=1.7 AoV=1.3  ------------------------------------------------------------------------  Observations:  Mitral Valve: Mitral annular calcification. Calcified  mitral leaflets.  Mean mitral gradient 2.0 mmHg at 89/min.  Aortic Valve/Aorta: Calcified aortic valve with normal  opening.  Mild aortic regurgitation.  Normal aortic root.  Left Atrium: Mild left atrial enlargement.  Left Ventricle: Endocardial visualization enhanced with  intravenous injection of Ultrasonic Enhancing Agent  (Definity).  Normal left ventricular internal dimensions and wall  thicknesses.  Normal left ventricular systolic function. No segmental  wall motion abnormalities.  Right Heart: Normal right atrium. Normal right ventricular  size and systolic function. Normal tricuspid valve. Minimal  tricuspid regurgitation. Normal pulmonic valve.  Pericardium/Pleura: Normal pericardium with no pericardial  effusion.  Hemodynamic: No evidence of pulmonary hypertension.  No PFO seen with color Doppler.  ------------------------------------------------------------------------  Conclusions:  Endocardial visualization enhanced with intravenous  injection of Ultrasonic Enhancing Agent (Definity).  Normal left ventricular systolic function. No segmental  wall motion abnormalities.  ------------------------------------------------------------------------  Confirmed on  12/20/2019 - 14:28:17 by Chriss Charles, M.D.  ------------------------------------------------------------------------    < end of copied text >    Interpretation of Telemetry: NSR with rate controlled PAF overnight (no slower than 70 bpm, no conversion pause seen)

## 2020-01-22 NOTE — DISCHARGE NOTE PROVIDER - NSDCCPCAREPLAN_GEN_ALL_CORE_FT
PRINCIPAL DISCHARGE DIAGNOSIS  Diagnosis: Pre-syncope  Assessment and Plan of Treatment: No events on tele.   -Continue Cardizem  mg daily  -Discontinue labetalol      SECONDARY DISCHARGE DIAGNOSES  Diagnosis: Lactic acidosis  Assessment and Plan of Treatment: Resolving    Diagnosis: Fall  Assessment and Plan of Treatment: -Continue with Apixaban    Diagnosis: Type 2 diabetes mellitus  Assessment and Plan of Treatment: -Continue with home diabetic medications    Diagnosis: Essential hypertension  Assessment and Plan of Treatment:   -Continue Cardizem   -Discontinue Labetalol    Diagnosis: Paroxysmal A-fib  Assessment and Plan of Treatment: Now in NSR  -Continue Apixaban

## 2020-01-22 NOTE — DISCHARGE NOTE PROVIDER - NSDCMRMEDTOKEN_GEN_ALL_CORE_FT
Aspirin Enteric Coated 81 mg oral delayed release tablet: 1 tab(s) orally once a day  atorvastatin 40 mg oral tablet: 1 tab(s) orally once a day (at bedtime)  Cardizem  mg/24 hours oral capsule, extended release: 1 cap(s) orally once a day   cholecalciferol oral tablet: 2000 unit(s) orally once a day  Eliquis 5 mg oral tablet: 1 tab(s) orally 2 times a day   insulin lispro (concentrated) 200 units/mL subcutaneous solution: 25 unit(s) subcutaneous 3 times a day (before meals)  metFORMIN 500 mg oral tablet: 1 tab(s) orally 2 times a day  Multiple Vitamins with Minerals oral tablet: 1 tab(s) orally once a day  ocular lubricant ophthalmic solution: 1 drop(s) to each affected eye 4 times a day, As needed, Dry Eyes  tamsulosin 0.4 mg oral capsule: 1 cap(s) orally once a day (at bedtime)  Toujeo SoloStar 300 units/mL subcutaneous solution: 70 unit(s) subcutaneous once a day (at bedtime) Aspirin Enteric Coated 81 mg oral delayed release tablet: 1 tab(s) orally once a day  Cardizem  mg/24 hours oral capsule, extended release: 1 cap(s) orally once a day   cholecalciferol oral tablet: 2000 unit(s) orally once a day  Crestor 10 mg oral tablet: 1 tab(s) orally once a day   Eliquis 5 mg oral tablet: 1 tab(s) orally 2 times a day   insulin lispro (concentrated) 200 units/mL subcutaneous solution: 25 unit(s) subcutaneous 3 times a day (before meals)  metFORMIN 500 mg oral tablet: 1 tab(s) orally 2 times a day  Multiple Vitamins with Minerals oral tablet: 1 tab(s) orally once a day  ocular lubricant ophthalmic solution: 1 drop(s) to each affected eye 4 times a day, As needed, Dry Eyes  tamsulosin 0.4 mg oral capsule: 1 cap(s) orally once a day (at bedtime)  Toujeo SoloStar 300 units/mL subcutaneous solution: 70 unit(s) subcutaneous once a day (at bedtime)

## 2020-01-22 NOTE — DISCHARGE NOTE PROVIDER - CARE PROVIDER_API CALL
Vladimir Mendieta (MD)  Cardiovascular Disease; Internal Medicine  [address] [facility], [facility] [address]  [place] [address]  Phone: [phone]  Fax: [phone]  Established Patient  Follow Up Time: 1-3 days

## 2020-01-22 NOTE — PROGRESS NOTE ADULT - ATTENDING COMMENTS
discussed with patient in detail, expresses understanding of treatment plans.   discussed with patient's family at bedside in detail

## 2020-01-22 NOTE — PROGRESS NOTE ADULT - SUBJECTIVE AND OBJECTIVE BOX
Patient is a 84y old  Male who presents with a chief complaint of fall, lightheaded (22 Jan 2020 13:00)      SUBJECTIVE / OVERNIGHT EVENTS: overnight events noted    ROS:  Resp: No cough no sputum production  CVS: No chest pain no palpitations no orthopnea  GI: no N/V/D  : no dysuria, no hematuria  Neuro: no weakness no paresthesias  Heme: No petechiae no easy bruising  Msk: No joint pain no swelling  Skin: No rash no itching        MEDICATIONS  (STANDING):  apixaban 5 milliGRAM(s) Oral two times a day  aspirin enteric coated 81 milliGRAM(s) Oral daily  atorvastatin 40 milliGRAM(s) Oral at bedtime  dextrose 5%. 1000 milliLiter(s) (50 mL/Hr) IV Continuous <Continuous>  dextrose 50% Injectable 12.5 Gram(s) IV Push once  diltiazem    milliGRAM(s) Oral daily  insulin glargine Injectable (LANTUS) 40 Unit(s) SubCutaneous at bedtime  insulin lispro (HumaLOG) corrective regimen sliding scale   SubCutaneous three times a day before meals  insulin lispro (HumaLOG) corrective regimen sliding scale   SubCutaneous at bedtime  tamsulosin 0.4 milliGRAM(s) Oral at bedtime    MEDICATIONS  (PRN):  artificial tears (preservative free) Ophthalmic Solution 1 Drop(s) Both EYES four times a day PRN Dry Eyes  dextrose 40% Gel 15 Gram(s) Oral once PRN Blood Glucose LESS THAN 70 milliGRAM(s)/deciliter  glucagon  Injectable 1 milliGRAM(s) IntraMuscular once PRN Glucose LESS THAN 70 milligrams/deciliter        CAPILLARY BLOOD GLUCOSE      POCT Blood Glucose.: 240 mg/dL (22 Jan 2020 11:42)  POCT Blood Glucose.: 229 mg/dL (22 Jan 2020 07:37)  POCT Blood Glucose.: 351 mg/dL (21 Jan 2020 21:25)    I&O's Summary    21 Jan 2020 07:01  -  22 Jan 2020 07:00  --------------------------------------------------------  IN: 480 mL / OUT: 250 mL / NET: 230 mL    22 Jan 2020 07:01  -  22 Jan 2020 17:16  --------------------------------------------------------  IN: 480 mL / OUT: 125 mL / NET: 355 mL        Vital Signs Last 24 Hrs  T(C): 36.7 (22 Jan 2020 16:50), Max: 37.1 (21 Jan 2020 20:12)  T(F): 98.1 (22 Jan 2020 16:50), Max: 98.7 (21 Jan 2020 20:12)  HR: 79 (22 Jan 2020 16:50) (70 - 115)  BP: 145/81 (22 Jan 2020 16:50) (142/73 - 146/70)  BP(mean): --  RR: 18 (22 Jan 2020 16:50) (18 - 18)  SpO2: 98% (22 Jan 2020 16:50) (97% - 98%)    PHYSICAL EXAM:  GENERAL: NAD, well-developed  HEAD:  Atraumatic, Normocephalic  EYES: EOMI, PERRLA, conjunctiva and sclera clear  NECK: Supple, No JVD  CHEST/LUNG: Clear to auscultation bilaterally; No wheeze  HEART: S1, S2; No murmurs, rubs, or gallops  ABDOMEN: Soft, Nontender, Nondistended; Bowel sounds present  EXTREMITIES:  2+ Peripheral Pulses, No clubbing, cyanosis, or edema  PSYCH: Normal affect  NEUROLOGY: AAOX3; non-focal  SKIN: No rashes or lesions    LABS:                        11.5   7.15  )-----------( 205      ( 22 Jan 2020 07:35 )             34.9     01-22    141  |  106  |  16  ----------------------------<  202<H>  3.8   |  24  |  0.88    Ca    8.9      22 Jan 2020 06:43  Phos  2.7     01-21  Mg     1.6     01-21                  All consultant(s) notes reviewed and care discussed with other providers        Contact Number, Dr Babin [phone]

## 2020-01-22 NOTE — PROGRESS NOTE ADULT - PROBLEM SELECTOR PLAN 1
Tele no events   now in NSR  continue Cardizem  mg qd  discontinue labetalol  monitor overnight   if no events on tele

## 2020-01-22 NOTE — DISCHARGE NOTE PROVIDER - HOSPITAL COURSE
84M c hx DM2 on insulin, HTN, BPH, HLD, Afib on Eliquis, frequent falls, p/w presyncope and fall.         Problem/Plan - 1:    ·  Problem: Pre-syncope.  Plan: Tele - no events     Now in NSR    Continue Cardizem  mg daily    Discontinue labetalol         Problem/Plan - 2:    ·  Problem: Lactic acid acidosis.  Plan: off antibiotics    Resolving         Problem/Plan - 3:    ·  Problem: Fall, initial encounter.  Plan: Continue Apixaban    CHADS VASC 2 is at least 2    Risk of CVA at present > bleeding from fall         Problem/Plan - 4:    ·  Problem: Type 2 diabetes mellitus with other specified complication, with long-term current use of insulin.  Plan: Continue with home diabetic medications    finger sticks 200 - 300 range    monitor for hypoglycemia         Problem/Plan - 5:    ·  Problem: Essential hypertension.  Plan: Continue Cardizem     Discontinue Labetalol    BP acceptable         Problem/Plan - 6:    Problem: Paroxysmal atrial fibrillation. Plan: Now in NSR    Continue to monitor    Continue Apixaban

## 2020-01-22 NOTE — PROGRESS NOTE ADULT - PROBLEM SELECTOR PLAN 3
continue Apixaban  CHADS VASC 2 is at least 2  risk of CVA at present > bleeding from fall  discussed with patient's son at the bedside in detail and patient and wife in detail  family is aware of the above and choses to stay on Apixaban

## 2020-01-28 ENCOUNTER — NON-APPOINTMENT (OUTPATIENT)
Age: 85
End: 2020-01-28

## 2020-01-28 ENCOUNTER — APPOINTMENT (OUTPATIENT)
Dept: CARDIOLOGY | Facility: CLINIC | Age: 85
End: 2020-01-28
Payer: MEDICARE

## 2020-01-28 VITALS
DIASTOLIC BLOOD PRESSURE: 62 MMHG | OXYGEN SATURATION: 97 % | TEMPERATURE: 98.1 F | WEIGHT: 188 LBS | SYSTOLIC BLOOD PRESSURE: 124 MMHG | RESPIRATION RATE: 16 BRPM | HEART RATE: 88 BPM | HEIGHT: 69 IN | BODY MASS INDEX: 27.85 KG/M2

## 2020-01-28 DIAGNOSIS — R29.6 REPEATED FALLS: ICD-10-CM

## 2020-01-28 PROCEDURE — 93000 ELECTROCARDIOGRAM COMPLETE: CPT

## 2020-01-28 PROCEDURE — 99215 OFFICE O/P EST HI 40 MIN: CPT

## 2020-02-10 ENCOUNTER — APPOINTMENT (OUTPATIENT)
Dept: CARDIOLOGY | Facility: CLINIC | Age: 85
End: 2020-02-10
Payer: MEDICARE

## 2020-02-10 VITALS
HEIGHT: 69 IN | HEART RATE: 93 BPM | DIASTOLIC BLOOD PRESSURE: 78 MMHG | BODY MASS INDEX: 27.85 KG/M2 | WEIGHT: 188 LBS | OXYGEN SATURATION: 98 % | SYSTOLIC BLOOD PRESSURE: 134 MMHG

## 2020-02-10 DIAGNOSIS — R35.0 FREQUENCY OF MICTURITION: ICD-10-CM

## 2020-02-10 DIAGNOSIS — Z87.440 PERSONAL HISTORY OF URINARY (TRACT) INFECTIONS: ICD-10-CM

## 2020-02-10 DIAGNOSIS — Z87.898 PERSONAL HISTORY OF OTHER SPECIFIED CONDITIONS: ICD-10-CM

## 2020-02-10 PROCEDURE — 99213 OFFICE O/P EST LOW 20 MIN: CPT

## 2020-02-10 NOTE — DISCUSSION/SUMMARY
[FreeTextEntry1] : Patient was examined.  His blood pressure was 134/78, and his pulse was 93.  He had no CVA tenderness.  He had no tenderness over his bladder.  He was given a prescription for a urinalysis and urine culture.  No new medications were prescribed at this visit.  He will return as needed.

## 2020-02-10 NOTE — REASON FOR VISIT
[FreeTextEntry1] : Patient went to his endocrinologist and his sugars have been significantly higher.  The endocrinologist wants make sure he does not have a urinary tract infection that would be raising his sugars.  He denies any fever, chills, but does have some increased urinary frequency especially at night.  He denies any dysuria.

## 2020-02-10 NOTE — PHYSICAL EXAM
[General Appearance - Well Developed] : well developed [Normal Appearance] : normal appearance [General Appearance - Well Nourished] : well nourished [No Deformities] : no deformities [Well Groomed] : well groomed [General Appearance - In No Acute Distress] : no acute distress [Eyelids - No Xanthelasma] : the eyelids demonstrated no xanthelasmas [Normal Conjunctiva] : the conjunctiva exhibited no abnormalities [Normal Oral Mucosa] : normal oral mucosa [No Oral Pallor] : no oral pallor [No Oral Cyanosis] : no oral cyanosis [Normal Jugular Venous V Waves Present] : normal jugular venous V waves present [Normal Jugular Venous A Waves Present] : normal jugular venous A waves present [No Jugular Venous Sahu A Waves] : no jugular venous sahu A waves [Abdomen Soft] : soft [Abdomen Tenderness] : non-tender [Abdomen Mass (___ Cm)] : no abdominal mass palpated [Gait - Sufficient For Exercise Testing] : the gait was sufficient for exercise testing [Abnormal Walk] : normal gait [Nail Clubbing] : no clubbing of the fingernails [Cyanosis, Localized] : no localized cyanosis [Petechial Hemorrhages (___cm)] : no petechial hemorrhages [] : no rash [Skin Color & Pigmentation] : normal skin color and pigmentation [No Venous Stasis] : no venous stasis [Skin Lesions] : no skin lesions [No Skin Ulcers] : no skin ulcer [No Xanthoma] : no  xanthoma was observed [Affect] : the affect was normal [Oriented To Time, Place, And Person] : oriented to person, place, and time [Mood] : the mood was normal [No Anxiety] : not feeling anxious

## 2020-02-13 LAB
APPEARANCE: CLEAR
BILIRUBIN URINE: NEGATIVE
BLOOD URINE: NEGATIVE
COLOR: YELLOW
GLUCOSE QUALITATIVE U: NORMAL
KETONES URINE: NEGATIVE
LEUKOCYTE ESTERASE URINE: NEGATIVE
NITRITE URINE: NEGATIVE
PH URINE: 6.5
PROTEIN URINE: NORMAL
SPECIFIC GRAVITY URINE: 1.02
UROBILINOGEN URINE: NORMAL

## 2020-02-14 LAB — BACTERIA UR CULT: ABNORMAL

## 2020-02-25 ENCOUNTER — APPOINTMENT (OUTPATIENT)
Dept: CARDIOLOGY | Facility: CLINIC | Age: 85
End: 2020-02-25

## 2020-03-05 ENCOUNTER — APPOINTMENT (OUTPATIENT)
Dept: CARDIOLOGY | Facility: CLINIC | Age: 85
End: 2020-03-05

## 2020-03-06 NOTE — BEHAVIORAL HEALTH ASSESSMENT NOTE - NSBHCONSORIP_PSY_A_CORE
[facility]  Orthopedics  Progress Note    Patient Name: Jacy Angel  MRN: [id number]  Admission Date: 3/5/2020  Hospital Length of Stay: 1 days  Attending Provider: Gillian Worrell MD  Primary Care Provider: Stan Guy MD  Follow-up For: Procedure(s) (LRB):  INSERTION, INTRAMEDULLARY JAMAL, FEMUR, DISTAL, RETROGRADE (Left)    Post-Operative Day: 1 Day Post-Op  Subjective:     Principal Problem:Closed fracture of shaft of left femur, initial encounter    Principal Orthopedic Problem: same    Interval History: Patient seen and examined at bedside.  No acute events overnight.  Pain controlled. Went for retrograde yesterday.     Review of patient's allergies indicates:   Allergen Reactions    Scopolamine      Other reaction(s): Flushing (Skin)       Current Facility-Administered Medications   Medication    acetaminophen tablet 650 mg    baclofen tablet 10 mg    bisacodyL suppository 10 mg    clindamycin 600 MG/50 ML D5W 600 mg/50 mL IVPB 600 mg    dextrose 10% (D10W) Bolus    dextrose 10% (D10W) Bolus    diazePAM 1 mg/mL oral solution 1 mg    diazePAM tablet 2 mg    enoxaparin injection 30 mg    escitalopram oxalate tablet 5 mg    gabapentin capsule 200 mg    gabapentin capsule 400 mg    glucagon (human recombinant) injection 1 mg    glucose chewable tablet 16 g    glucose chewable tablet 24 g    insulin aspart U-100 pen 0-5 Units    morphine injection 2 mg    ondansetron injection 4 mg    polyethylene glycol packet 17 g    sodium chloride 0.9% flush 10 mL    vancomycin 750 mg in dextrose 5 % 250 mL IVPB (ready to mix system)     Objective:     Vital Signs (Most Recent):  Temp: 97.2 °F (36.2 °C) (03/05/20 2215)  Pulse: 79 (03/06/20 0533)  Resp: 18 (03/06/20 0533)  BP: 101/64 (03/06/20 0533)  SpO2: 100 % (03/06/20 0533) Vital Signs (24h Range):  Temp:  [97.2 °F (36.2 °C)-98.6 °F (37 °C)] 97.2 °F (36.2 °C)  Pulse:  [] 79  Resp:  [13-30] 18  SpO2:  [93 %-100 %]  100 %  BP: ()/(59-91) 101/64     Weight: 46.5 kg (102 lb 8.2 oz)     Body mass index is 20.02 kg/m².      Intake/Output Summary (Last 24 hours) at 3/6/2020 0602  Last data filed at 3/5/2020 2200  Gross per 24 hour   Intake 650 ml   Output 900 ml   Net -250 ml       Ortho/SPM Exam    Awake    LLE:  Dressings c/d/i  Little movement of extremity, at baseline  Warm well perfused extremity    Significant Labs:   BMP:   Recent Labs   Lab 03/05/20  1023 03/05/20  1758   GLU 95 181*    146*   K 4.3 4.1    110   CO2 27 23   BUN 12 11   CREATININE 0.5 0.6   CALCIUM 9.4 8.4*   MG 2.7*  --      CBC:   Recent Labs   Lab 03/05/20  1023 03/05/20  1758   WBC 8.34 9.29   HGB 11.7* 10.8*   HCT 39.5 37.1    259     All pertinent labs within the past 24 hours have been reviewed.    Significant Imaging: None    Assessment/Plan:     * Closed fracture of shaft of left femur, initial encounter  Jacy Angel is a 44 y.o. female with Left femoral shaft fx s/p retrograde nail on 3/5/2020      - DVT ppx: lovenox for 28 days  - Clindamycin while in house   - PT for education with mother on transfers  - NW[place] RLE    - Dispo: continue to treat for medical conditions          Leandro Barajas MD  Orthopedics  [facility]   Consult...

## 2020-03-13 ENCOUNTER — RX RENEWAL (OUTPATIENT)
Age: 85
End: 2020-03-13

## 2020-05-14 RX ORDER — AMOXICILLIN 500 MG/1
500 TABLET, FILM COATED ORAL 3 TIMES DAILY
Qty: 30 | Refills: 0 | Status: DISCONTINUED | COMMUNITY
Start: 2020-02-14 | End: 2020-05-14

## 2020-05-14 RX ORDER — ROSUVASTATIN CALCIUM 10 MG/1
10 TABLET, FILM COATED ORAL
Qty: 90 | Refills: 2 | Status: DISCONTINUED | COMMUNITY
Start: 2017-02-17 | End: 2020-05-14

## 2020-05-21 RX ORDER — DICLOFENAC SODIUM 75 MG/1
75 TABLET, DELAYED RELEASE ORAL
Qty: 60 | Refills: 0 | Status: DISCONTINUED | COMMUNITY
Start: 2019-10-17 | End: 2020-05-21

## 2020-05-21 RX ORDER — IBUPROFEN 800 MG/1
800 TABLET, FILM COATED ORAL
Qty: 90 | Refills: 0 | Status: DISCONTINUED | COMMUNITY
Start: 2019-11-21 | End: 2020-05-21

## 2020-06-18 ENCOUNTER — APPOINTMENT (OUTPATIENT)
Dept: CARDIOLOGY | Facility: CLINIC | Age: 85
End: 2020-06-18
Payer: MEDICARE

## 2020-06-18 ENCOUNTER — NON-APPOINTMENT (OUTPATIENT)
Age: 85
End: 2020-06-18

## 2020-06-18 ENCOUNTER — LABORATORY RESULT (OUTPATIENT)
Age: 85
End: 2020-06-18

## 2020-06-18 VITALS
DIASTOLIC BLOOD PRESSURE: 62 MMHG | HEART RATE: 86 BPM | TEMPERATURE: 96.9 F | OXYGEN SATURATION: 98 % | SYSTOLIC BLOOD PRESSURE: 101 MMHG | HEIGHT: 69 IN

## 2020-06-18 DIAGNOSIS — M47.816 SPONDYLOSIS W/OUT MYELOPATHY OR RADICULOPATHY, LUMBAR REGION: ICD-10-CM

## 2020-06-18 DIAGNOSIS — F41.9 ANXIETY DISORDER, UNSPECIFIED: ICD-10-CM

## 2020-06-18 DIAGNOSIS — E11.40 TYPE 2 DIABETES MELLITUS WITH DIABETIC NEUROPATHY, UNSPECIFIED: ICD-10-CM

## 2020-06-18 DIAGNOSIS — M79.89 OTHER SPECIFIED SOFT TISSUE DISORDERS: ICD-10-CM

## 2020-06-18 PROCEDURE — 93000 ELECTROCARDIOGRAM COMPLETE: CPT

## 2020-06-18 PROCEDURE — 99215 OFFICE O/P EST HI 40 MIN: CPT

## 2020-06-19 LAB
ALBUMIN SERPL ELPH-MCNC: 4.6 G/DL
ALP BLD-CCNC: 54 U/L
ALT SERPL-CCNC: 13 U/L
ANION GAP SERPL CALC-SCNC: 18 MMOL/L
AST SERPL-CCNC: 12 U/L
BASOPHILS # BLD AUTO: 0.09 K/UL
BASOPHILS NFR BLD AUTO: 1 %
BILIRUB SERPL-MCNC: 0.4 MG/DL
BUN SERPL-MCNC: 37 MG/DL
CALCIUM SERPL-MCNC: 9.9 MG/DL
CHLORIDE SERPL-SCNC: 99 MMOL/L
CHOLEST SERPL-MCNC: 138 MG/DL
CHOLEST/HDLC SERPL: 3.8 RATIO
CO2 SERPL-SCNC: 23 MMOL/L
CREAT SERPL-MCNC: 1.39 MG/DL
EOSINOPHIL # BLD AUTO: 0.75 K/UL
EOSINOPHIL NFR BLD AUTO: 8.1 %
ESTIMATED AVERAGE GLUCOSE: 114 MG/DL
FT4I SERPL CALC-MCNC: 6.2 INDEX
GLUCOSE SERPL-MCNC: 119 MG/DL
HBA1C MFR BLD HPLC: 5.6 %
HCT VFR BLD CALC: 43.5 %
HDLC SERPL-MCNC: 37 MG/DL
HGB BLD-MCNC: 13.7 G/DL
IMM GRANULOCYTES NFR BLD AUTO: 0.5 %
LDLC SERPL CALC-MCNC: 66 MG/DL
LYMPHOCYTES # BLD AUTO: 2.26 K/UL
LYMPHOCYTES NFR BLD AUTO: 24.5 %
MAN DIFF?: NORMAL
MCHC RBC-ENTMCNC: 29.5 PG
MCHC RBC-ENTMCNC: 31.5 GM/DL
MCV RBC AUTO: 93.5 FL
MONOCYTES # BLD AUTO: 0.61 K/UL
MONOCYTES NFR BLD AUTO: 6.6 %
NEUTROPHILS # BLD AUTO: 5.47 K/UL
NEUTROPHILS NFR BLD AUTO: 59.3 %
PLATELET # BLD AUTO: 225 K/UL
POTASSIUM SERPL-SCNC: 4.4 MMOL/L
PROT SERPL-MCNC: 6.5 G/DL
RBC # BLD: 4.65 M/UL
RBC # FLD: 13.2 %
SODIUM SERPL-SCNC: 140 MMOL/L
T3RU NFR SERPL: 1 TBI
T4 SERPL-MCNC: 6.2 UG/DL
TRIGL SERPL-MCNC: 178 MG/DL
TSH SERPL-ACNC: 1.55 UIU/ML
WBC # FLD AUTO: 9.23 K/UL

## 2020-06-25 RX ORDER — DULOXETINE HYDROCHLORIDE 60 MG/1
60 CAPSULE, DELAYED RELEASE PELLETS ORAL
Qty: 90 | Refills: 1 | Status: DISCONTINUED | COMMUNITY
Start: 2019-07-26 | End: 2020-06-25

## 2020-06-28 ENCOUNTER — RX RENEWAL (OUTPATIENT)
Age: 85
End: 2020-06-28

## 2020-07-20 ENCOUNTER — NON-APPOINTMENT (OUTPATIENT)
Age: 85
End: 2020-07-20

## 2020-07-30 RX ORDER — APIXABAN 5 MG/1
5 TABLET, FILM COATED ORAL
Qty: 60 | Refills: 3 | Status: DISCONTINUED | COMMUNITY
Start: 2020-03-18 | End: 2020-07-30

## 2020-08-04 RX ORDER — LABETALOL HYDROCHLORIDE 200 MG/1
200 TABLET, FILM COATED ORAL TWICE DAILY
Qty: 360 | Refills: 0 | Status: DISCONTINUED | COMMUNITY
Start: 2019-12-02 | End: 2020-08-04

## 2020-08-10 ENCOUNTER — RX RENEWAL (OUTPATIENT)
Age: 85
End: 2020-08-10

## 2020-09-21 ENCOUNTER — APPOINTMENT (OUTPATIENT)
Dept: CARDIOLOGY | Facility: CLINIC | Age: 85
End: 2020-09-21
Payer: MEDICARE

## 2020-09-21 DIAGNOSIS — Z23 ENCOUNTER FOR IMMUNIZATION: ICD-10-CM

## 2020-09-21 PROCEDURE — G0008: CPT

## 2020-09-21 PROCEDURE — 90662 IIV NO PRSV INCREASED AG IM: CPT

## 2020-10-13 ENCOUNTER — TRANSCRIPTION ENCOUNTER (OUTPATIENT)
Age: 85
End: 2020-10-13

## 2020-11-06 ENCOUNTER — RX RENEWAL (OUTPATIENT)
Age: 85
End: 2020-11-06

## 2020-11-15 ENCOUNTER — RX RENEWAL (OUTPATIENT)
Age: 85
End: 2020-11-15

## 2020-11-23 DIAGNOSIS — M17.0 BILATERAL PRIMARY OSTEOARTHRITIS OF KNEE: ICD-10-CM

## 2020-11-23 DIAGNOSIS — G62.9 POLYNEUROPATHY, UNSPECIFIED: ICD-10-CM

## 2020-12-13 ENCOUNTER — RX RENEWAL (OUTPATIENT)
Age: 85
End: 2020-12-13

## 2020-12-22 DIAGNOSIS — R31.9 HEMATURIA, UNSPECIFIED: ICD-10-CM

## 2020-12-23 PROBLEM — Z87.440 HISTORY OF URINARY TRACT INFECTION: Status: RESOLVED | Noted: 2020-02-10 | Resolved: 2020-12-23

## 2021-01-04 ENCOUNTER — NON-APPOINTMENT (OUTPATIENT)
Age: 86
End: 2021-01-04

## 2021-01-28 NOTE — ED PROVIDER NOTE - NS_BEDUNITTYPES_ED_ALL_ED
RX PROGRESS NOTE: Vancomycin Therapeutic Drug Monitoring      Indication for therapy: Bacteremia    ALLERGIES:   Allergen Reactions   • Tramadol Nausea & Vomiting       Most recent height and weight information:  Weight: 52.1 kg (01/27/21 0300)       The Following are the calculated  Current Weights for Caden Rush            Adjusted Ideal    None None             Labs:  Serum Creatinine and Creatinine Clearance:  Creatinine clearance cannot be calculated (Unknown ideal weight.)    Maximum Temperature (last 24 hours)     Value Max    Temp  99 °F (37.2 °C)        WBC (K/mcL)   Date/Time Value   01/28/2021 1358 23.5 (H)   01/28/2021 0736 13.2 (H)   01/26/2021 1756 14.4 (H)     Microbiology Results     None            Assessment/Plan:  Briefly, this is a 71 year old male started on vancomycin for Bacteremia, with a target serum trough concentration of 10-15 mcg/mL.  Patient received a dose of vancomycin 1000 mg on 1/25 and 500mg on 1/26 at 2159.  Initial dosing regimen will be 500mg post HD.    Pharmacy will monitor levels as appropriate. Level ordered for AM to assess if ok to redose.    Pharmacy will continue to monitor patient (renal function, microbiology data, risk factors for adverse events, appropriate duration of therapy), will order/monitor serum levels as appropriate, and will adjust dose if/when necessary.      Thank you,    Eva Anne RPH  1/28/2021 5:56 PM     MEDICINE

## 2021-02-12 ENCOUNTER — RX RENEWAL (OUTPATIENT)
Age: 86
End: 2021-02-12

## 2021-03-11 ENCOUNTER — RX RENEWAL (OUTPATIENT)
Age: 86
End: 2021-03-11

## 2021-03-25 NOTE — PATIENT PROFILE ADULT - SAFE PLACE TO LIVE
Cimzia Counseling:  I discussed with the patient the risks of Cimzia including but not limited to immunosuppression, allergic reactions and infections.  The patient understands that monitoring is required including a PPD at baseline and must alert us or the primary physician if symptoms of infection or other concerning signs are noted. no

## 2021-05-06 ENCOUNTER — RX RENEWAL (OUTPATIENT)
Age: 86
End: 2021-05-06

## 2021-05-31 ENCOUNTER — RX RENEWAL (OUTPATIENT)
Age: 86
End: 2021-05-31

## 2021-06-17 ENCOUNTER — NON-APPOINTMENT (OUTPATIENT)
Age: 86
End: 2021-06-17

## 2021-06-17 ENCOUNTER — APPOINTMENT (OUTPATIENT)
Dept: CARDIOLOGY | Facility: CLINIC | Age: 86
End: 2021-06-17
Payer: MEDICARE

## 2021-06-17 VITALS
WEIGHT: 173 LBS | OXYGEN SATURATION: 97 % | HEART RATE: 90 BPM | DIASTOLIC BLOOD PRESSURE: 60 MMHG | HEIGHT: 69 IN | BODY MASS INDEX: 25.62 KG/M2 | SYSTOLIC BLOOD PRESSURE: 100 MMHG

## 2021-06-17 DIAGNOSIS — R29.898 OTHER SYMPTOMS AND SIGNS INVOLVING THE MUSCULOSKELETAL SYSTEM: ICD-10-CM

## 2021-06-17 DIAGNOSIS — E66.9 OBESITY, UNSPECIFIED: ICD-10-CM

## 2021-06-17 PROCEDURE — 99215 OFFICE O/P EST HI 40 MIN: CPT

## 2021-06-17 PROCEDURE — 93000 ELECTROCARDIOGRAM COMPLETE: CPT

## 2021-06-17 RX ORDER — DILTIAZEM HYDROCHLORIDE 240 MG/1
240 CAPSULE, EXTENDED RELEASE ORAL DAILY
Qty: 90 | Refills: 0 | Status: DISCONTINUED | COMMUNITY
Start: 2020-03-18 | End: 2021-06-17

## 2021-06-18 LAB
ALBUMIN SERPL ELPH-MCNC: 4.6 G/DL
ALP BLD-CCNC: 75 U/L
ALT SERPL-CCNC: 10 U/L
ANION GAP SERPL CALC-SCNC: 17 MMOL/L
AST SERPL-CCNC: 12 U/L
BASOPHILS # BLD AUTO: 0.07 K/UL
BASOPHILS NFR BLD AUTO: 0.8 %
BILIRUB SERPL-MCNC: 0.5 MG/DL
BUN SERPL-MCNC: 25 MG/DL
CALCIUM SERPL-MCNC: 9.9 MG/DL
CHLORIDE SERPL-SCNC: 99 MMOL/L
CHOLEST SERPL-MCNC: 68 MG/DL
CO2 SERPL-SCNC: 24 MMOL/L
CREAT SERPL-MCNC: 1.15 MG/DL
EOSINOPHIL # BLD AUTO: 0.69 K/UL
EOSINOPHIL NFR BLD AUTO: 8.1 %
ESTIMATED AVERAGE GLUCOSE: 140 MG/DL
GLUCOSE SERPL-MCNC: 134 MG/DL
HBA1C MFR BLD HPLC: 6.5 %
HCT VFR BLD CALC: 41.9 %
HDLC SERPL-MCNC: 33 MG/DL
HGB BLD-MCNC: 13.1 G/DL
IMM GRANULOCYTES NFR BLD AUTO: 0.2 %
LDLC SERPL CALC-MCNC: 15 MG/DL
LYMPHOCYTES # BLD AUTO: 1.91 K/UL
LYMPHOCYTES NFR BLD AUTO: 22.4 %
MAN DIFF?: NORMAL
MCHC RBC-ENTMCNC: 29.1 PG
MCHC RBC-ENTMCNC: 31.3 GM/DL
MCV RBC AUTO: 93.1 FL
MONOCYTES # BLD AUTO: 0.64 K/UL
MONOCYTES NFR BLD AUTO: 7.5 %
NEUTROPHILS # BLD AUTO: 5.21 K/UL
NEUTROPHILS NFR BLD AUTO: 61 %
NONHDLC SERPL-MCNC: 35 MG/DL
PLATELET # BLD AUTO: 216 K/UL
POTASSIUM SERPL-SCNC: 4.7 MMOL/L
PROT SERPL-MCNC: 6.5 G/DL
RBC # BLD: 4.5 M/UL
RBC # FLD: 13.7 %
SODIUM SERPL-SCNC: 140 MMOL/L
T4 FREE SERPL-MCNC: 1.2 NG/DL
TRIGL SERPL-MCNC: 102 MG/DL
TSH SERPL-ACNC: 1.75 UIU/ML
WBC # FLD AUTO: 8.54 K/UL

## 2021-08-17 DIAGNOSIS — R13.10 DYSPHAGIA, UNSPECIFIED: ICD-10-CM

## 2021-09-05 ENCOUNTER — RX RENEWAL (OUTPATIENT)
Age: 86
End: 2021-09-05

## 2021-09-17 NOTE — PROGRESS NOTE ADULT - PROBLEM SELECTOR PLAN 1
Called pt no answer   Tele no events   now in NSR  continue Cardizem  mg qd  discontinue labetalol  monitor overnight   if no events on tele w.r.t maryann can discharge to [facility]

## 2021-09-23 ENCOUNTER — RX RENEWAL (OUTPATIENT)
Age: 86
End: 2021-09-23

## 2021-11-02 ENCOUNTER — RX RENEWAL (OUTPATIENT)
Age: 86
End: 2021-11-02

## 2021-11-15 DIAGNOSIS — I48.0 PAROXYSMAL ATRIAL FIBRILLATION: ICD-10-CM

## 2021-11-29 ENCOUNTER — RX RENEWAL (OUTPATIENT)
Age: 86
End: 2021-11-29

## 2021-12-09 LAB
ALBUMIN SERPL ELPH-MCNC: 4.6 G/DL
ALP BLD-CCNC: 62 U/L
ALT SERPL-CCNC: 11 U/L
ANION GAP SERPL CALC-SCNC: 13 MMOL/L
AST SERPL-CCNC: 10 U/L
BASOPHILS # BLD AUTO: 0.08 K/UL
BASOPHILS NFR BLD AUTO: 1 %
BILIRUB SERPL-MCNC: 0.5 MG/DL
BUN SERPL-MCNC: 27 MG/DL
CALCIUM SERPL-MCNC: 9.6 MG/DL
CHLORIDE SERPL-SCNC: 99 MMOL/L
CHOLEST SERPL-MCNC: 71 MG/DL
CO2 SERPL-SCNC: 26 MMOL/L
CREAT SERPL-MCNC: 1.15 MG/DL
EOSINOPHIL # BLD AUTO: 0.57 K/UL
EOSINOPHIL NFR BLD AUTO: 7.5 %
ESTIMATED AVERAGE GLUCOSE: 148 MG/DL
GLUCOSE SERPL-MCNC: 220 MG/DL
HBA1C MFR BLD HPLC: 6.8 %
HCT VFR BLD CALC: 40.4 %
HDLC SERPL-MCNC: 35 MG/DL
HGB BLD-MCNC: 13.1 G/DL
IMM GRANULOCYTES NFR BLD AUTO: 0.3 %
LDLC SERPL CALC-MCNC: 22 MG/DL
LYMPHOCYTES # BLD AUTO: 1.89 K/UL
LYMPHOCYTES NFR BLD AUTO: 24.7 %
MAN DIFF?: NORMAL
MCHC RBC-ENTMCNC: 30 PG
MCHC RBC-ENTMCNC: 32.4 GM/DL
MCV RBC AUTO: 92.7 FL
MONOCYTES # BLD AUTO: 0.55 K/UL
MONOCYTES NFR BLD AUTO: 7.2 %
NEUTROPHILS # BLD AUTO: 4.53 K/UL
NEUTROPHILS NFR BLD AUTO: 59.3 %
NONHDLC SERPL-MCNC: 36 MG/DL
PLATELET # BLD AUTO: 209 K/UL
POTASSIUM SERPL-SCNC: 4.4 MMOL/L
PROT SERPL-MCNC: 6.6 G/DL
RBC # BLD: 4.36 M/UL
RBC # FLD: 12.7 %
SODIUM SERPL-SCNC: 138 MMOL/L
T4 SERPL-MCNC: 5.8 UG/DL
TRIGL SERPL-MCNC: 68 MG/DL
TSH SERPL-ACNC: 1.75 UIU/ML
WBC # FLD AUTO: 7.64 K/UL

## 2022-01-04 ENCOUNTER — RX RENEWAL (OUTPATIENT)
Age: 87
End: 2022-01-04

## 2022-01-21 NOTE — H&P ADULT - PROBLEM SELECTOR PLAN 6
Patient here today for nurse blood pressure check.  Last dose of BP medication taken:  1/20/22 730 pm    BP Readings from Last 1 Encounters:   01/21/22 1240 118/62     Pulse Readings from Last 1 Encounters:   01/21/22 69       Last Visit for this condition 1/7/22  Next visit is scheduled for: 1/9/2023    Current Medications are: atorvastatin 10 mg and losartan 50 mg      Please review and advise on plan of care.  Next Nurse visit scheduled No.  
- will treat with Flomax 0.4

## 2022-02-28 ENCOUNTER — RX RENEWAL (OUTPATIENT)
Age: 87
End: 2022-02-28

## 2022-03-06 ENCOUNTER — RX RENEWAL (OUTPATIENT)
Age: 87
End: 2022-03-06

## 2022-03-07 ENCOUNTER — RX RENEWAL (OUTPATIENT)
Age: 87
End: 2022-03-07

## 2022-04-07 NOTE — ED ADULT NURSE NOTE - PRIMARY CARE PROVIDER
Patient says she is depressed because she has a son in [facility] and that "the father won't sign for him to come" to her.  unk

## 2022-04-26 ENCOUNTER — RX RENEWAL (OUTPATIENT)
Age: 87
End: 2022-04-26

## 2022-05-04 NOTE — PHARMACOTHERAPY INTERVENTION NOTE - OUTCOME
05/04/22 1113   IMM Letter   IMM Letter given to Patient/Family/Significant other/Guardian/POA/by: 5/4 second IMM letter given to patient and copy provided - per TERESA Salgado    IMM Letter date given: 05/04/22   IMM Letter time given: 0
accepted
accepted

## 2022-06-02 ENCOUNTER — RX RENEWAL (OUTPATIENT)
Age: 87
End: 2022-06-02

## 2022-06-21 ENCOUNTER — APPOINTMENT (OUTPATIENT)
Dept: CARDIOLOGY | Facility: CLINIC | Age: 87
End: 2022-06-21
Payer: MEDICARE

## 2022-06-21 ENCOUNTER — NON-APPOINTMENT (OUTPATIENT)
Age: 87
End: 2022-06-21

## 2022-06-21 VITALS
RESPIRATION RATE: 17 BRPM | BODY MASS INDEX: 25.48 KG/M2 | WEIGHT: 172 LBS | HEART RATE: 87 BPM | HEIGHT: 69 IN | SYSTOLIC BLOOD PRESSURE: 128 MMHG | DIASTOLIC BLOOD PRESSURE: 75 MMHG

## 2022-06-21 DIAGNOSIS — E78.5 HYPERLIPIDEMIA, UNSPECIFIED: ICD-10-CM

## 2022-06-21 DIAGNOSIS — F05 DELIRIUM DUE TO KNOWN PHYSIOLOGICAL CONDITION: ICD-10-CM

## 2022-06-21 DIAGNOSIS — I10 ESSENTIAL (PRIMARY) HYPERTENSION: ICD-10-CM

## 2022-06-21 DIAGNOSIS — E11.9 TYPE 2 DIABETES MELLITUS W/OUT COMPLICATIONS: ICD-10-CM

## 2022-06-21 DIAGNOSIS — R26.81 UNSTEADINESS ON FEET: ICD-10-CM

## 2022-06-21 PROCEDURE — 36415 COLL VENOUS BLD VENIPUNCTURE: CPT

## 2022-06-21 PROCEDURE — 99214 OFFICE O/P EST MOD 30 MIN: CPT

## 2022-06-21 PROCEDURE — 93000 ELECTROCARDIOGRAM COMPLETE: CPT

## 2022-06-21 NOTE — DISCUSSION/SUMMARY
[EKG obtained to assist in diagnosis and management of assessed problem(s)] : EKG obtained to assist in diagnosis and management of assessed problem(s)

## 2022-06-23 LAB
ALBUMIN SERPL ELPH-MCNC: 4.6 G/DL
ALP BLD-CCNC: 70 U/L
ALT SERPL-CCNC: 9 U/L
ANION GAP SERPL CALC-SCNC: 12 MMOL/L
AST SERPL-CCNC: 12 U/L
BASOPHILS # BLD AUTO: 0.07 K/UL
BASOPHILS NFR BLD AUTO: 0.8 %
BILIRUB SERPL-MCNC: 0.5 MG/DL
BUN SERPL-MCNC: 26 MG/DL
CALCIUM SERPL-MCNC: 9.7 MG/DL
CHLORIDE SERPL-SCNC: 101 MMOL/L
CHOLEST SERPL-MCNC: 78 MG/DL
CO2 SERPL-SCNC: 28 MMOL/L
CREAT SERPL-MCNC: 1.09 MG/DL
EGFR: 66 ML/MIN/1.73M2
EOSINOPHIL # BLD AUTO: 0.9 K/UL
EOSINOPHIL NFR BLD AUTO: 10.3 %
ESTIMATED AVERAGE GLUCOSE: 163 MG/DL
GLUCOSE SERPL-MCNC: 103 MG/DL
HBA1C MFR BLD HPLC: 7.3 %
HCT VFR BLD CALC: 41.6 %
HDLC SERPL-MCNC: 36 MG/DL
HGB BLD-MCNC: 13.4 G/DL
IMM GRANULOCYTES NFR BLD AUTO: 0.3 %
LDLC SERPL CALC-MCNC: 18 MG/DL
LYMPHOCYTES # BLD AUTO: 2.29 K/UL
LYMPHOCYTES NFR BLD AUTO: 26.2 %
MAN DIFF?: NORMAL
MCHC RBC-ENTMCNC: 29.8 PG
MCHC RBC-ENTMCNC: 32.2 GM/DL
MCV RBC AUTO: 92.4 FL
MONOCYTES # BLD AUTO: 0.6 K/UL
MONOCYTES NFR BLD AUTO: 6.9 %
NEUTROPHILS # BLD AUTO: 4.86 K/UL
NEUTROPHILS NFR BLD AUTO: 55.5 %
NONHDLC SERPL-MCNC: 41 MG/DL
PLATELET # BLD AUTO: 226 K/UL
POTASSIUM SERPL-SCNC: 4.6 MMOL/L
PROT SERPL-MCNC: 6.7 G/DL
RBC # BLD: 4.5 M/UL
RBC # FLD: 13.2 %
SODIUM SERPL-SCNC: 140 MMOL/L
T4 SERPL-MCNC: 6.9 UG/DL
TRIGL SERPL-MCNC: 118 MG/DL
TSH SERPL-ACNC: 1.65 UIU/ML
WBC # FLD AUTO: 8.75 K/UL

## 2022-07-05 DIAGNOSIS — U07.1 COVID-19: ICD-10-CM

## 2022-07-05 RX ORDER — DILTIAZEM HYDROCHLORIDE 240 MG/1
240 CAPSULE, EXTENDED RELEASE ORAL
Qty: 90 | Refills: 1 | Status: DISCONTINUED | COMMUNITY
Start: 2021-09-23 | End: 2022-07-05

## 2022-07-09 ENCOUNTER — RX RENEWAL (OUTPATIENT)
Age: 87
End: 2022-07-09

## 2022-07-11 ENCOUNTER — TRANSCRIPTION ENCOUNTER (OUTPATIENT)
Age: 87
End: 2022-07-11

## 2022-07-11 RX ORDER — NIRMATRELVIR AND RITONAVIR 300-100 MG
20 X 150 MG & KIT ORAL
Qty: 1 | Refills: 0 | Status: DISCONTINUED | COMMUNITY
Start: 2022-07-05 | End: 2022-07-11

## 2022-09-22 DIAGNOSIS — Z00.00 ENCOUNTER FOR GENERAL ADULT MEDICAL EXAMINATION W/OUT ABNORMAL FINDINGS: ICD-10-CM

## 2022-09-29 LAB
M TB IFN-G BLD-IMP: NEGATIVE
QUANTIFERON TB PLUS MITOGEN MINUS NIL: >10 IU/ML
QUANTIFERON TB PLUS NIL: 0.07 IU/ML
QUANTIFERON TB PLUS TB1 MINUS NIL: 0 IU/ML
QUANTIFERON TB PLUS TB2 MINUS NIL: 0.02 IU/ML

## 2022-10-03 RX ORDER — INSULIN GLARGINE 300 U/ML
300 INJECTION, SOLUTION SUBCUTANEOUS DAILY
Qty: 3 | Refills: 3 | Status: ACTIVE | COMMUNITY
Start: 2022-07-11 | End: 1900-01-01

## 2022-10-03 RX ORDER — DILTIAZEM HYDROCHLORIDE 180 MG/1
180 CAPSULE, EXTENDED RELEASE ORAL DAILY
Qty: 30 | Refills: 5 | Status: DISCONTINUED | COMMUNITY
Start: 2022-07-05 | End: 2022-10-03

## 2022-10-03 RX ORDER — ZOSTER VACCINE RECOMBINANT, ADJUVANTED 50 MCG/0.5
50 KIT INTRAMUSCULAR
Qty: 1 | Refills: 1 | Status: DISCONTINUED | OUTPATIENT
Start: 2018-04-10 | End: 2022-10-03

## 2022-10-03 RX ORDER — DICLOFENAC SODIUM 1% 10 MG/G
1 GEL TOPICAL
Qty: 2 | Refills: 3 | Status: ACTIVE | COMMUNITY
Start: 2022-10-03 | End: 1900-01-01

## 2022-10-03 RX ORDER — NITROFURANTOIN (MONOHYDRATE/MACROCRYSTALS) 25; 75 MG/1; MG/1
100 CAPSULE ORAL
Qty: 10 | Refills: 0 | Status: DISCONTINUED | COMMUNITY
Start: 2020-12-22 | End: 2022-10-03

## 2022-10-03 RX ORDER — ROSUVASTATIN CALCIUM 10 MG/1
10 TABLET, FILM COATED ORAL
Qty: 90 | Refills: 3 | Status: ACTIVE | COMMUNITY
Start: 2020-03-18 | End: 1900-01-01

## 2022-10-03 RX ORDER — DILTIAZEM HYDROCHLORIDE 300 MG/1
300 CAPSULE, EXTENDED RELEASE ORAL
Qty: 90 | Refills: 3 | Status: ACTIVE | COMMUNITY
Start: 2020-08-10 | End: 1900-01-01

## 2022-10-03 RX ORDER — TRIAMTERENE AND HYDROCHLOROTHIAZIDE 37.5; 25 MG/1; MG/1
37.5-25 CAPSULE ORAL EVERY OTHER DAY
Qty: 45 | Refills: 3 | Status: DISCONTINUED | COMMUNITY
Start: 2020-05-05 | End: 2022-10-03

## 2022-10-03 RX ORDER — QUETIAPINE FUMARATE 25 MG/1
25 TABLET ORAL
Qty: 30 | Refills: 0 | Status: DISCONTINUED | COMMUNITY
Start: 2021-04-26 | End: 2022-10-03

## 2022-10-03 RX ORDER — CELECOXIB 100 MG/1
100 CAPSULE ORAL
Qty: 180 | Refills: 3 | Status: ACTIVE | COMMUNITY
Start: 2018-05-17 | End: 1900-01-01

## 2022-10-03 RX ORDER — SEMAGLUTIDE 1.34 MG/ML
2 INJECTION, SOLUTION SUBCUTANEOUS
Qty: 4 | Refills: 0 | Status: ACTIVE | COMMUNITY
Start: 2021-06-28 | End: 1900-01-01

## 2022-10-03 RX ORDER — POLYETHYLENE GLYCOL 3350 17 G/17G
17 POWDER, FOR SOLUTION ORAL DAILY
Qty: 90 | Refills: 3 | Status: ACTIVE | COMMUNITY
Start: 2022-10-03 | End: 1900-01-01

## 2022-10-03 RX ORDER — MULTIVITAMIN
TABLET ORAL DAILY
Qty: 90 | Refills: 3 | Status: ACTIVE | COMMUNITY
Start: 2022-10-03 | End: 1900-01-01

## 2022-10-03 RX ORDER — DEXTRAN 70, GLYCERIN, HYPROMELLOSE 1; 2; 3 MG/ML; MG/ML; MG/ML
0.1-0.2-0.3 SOLUTION/ DROPS OPHTHALMIC DAILY
Qty: 2 | Refills: 0 | Status: ACTIVE | COMMUNITY
Start: 2022-10-03 | End: 1900-01-01

## 2022-10-11 ENCOUNTER — NON-APPOINTMENT (OUTPATIENT)
Age: 87
End: 2022-10-11

## 2022-10-12 ENCOUNTER — APPOINTMENT (OUTPATIENT)
Dept: CARDIOLOGY | Facility: CLINIC | Age: 87
End: 2022-10-12

## 2022-10-12 RX ORDER — DICLOFENAC SODIUM 1% 10 MG/G
1 GEL TOPICAL
Qty: 2 | Refills: 0 | Status: ACTIVE | COMMUNITY
Start: 2022-10-12 | End: 1900-01-01

## 2022-10-12 RX ORDER — DICLOFENAC SODIUM 10 MG/G
1 GEL TOPICAL
Qty: 3 | Refills: 3 | Status: DISCONTINUED | COMMUNITY
Start: 2018-12-16 | End: 2022-10-12

## 2022-10-12 RX ORDER — DULOXETINE HYDROCHLORIDE 40 MG/1
40 CAPSULE, DELAYED RELEASE PELLETS ORAL
Qty: 90 | Refills: 1 | Status: ACTIVE | COMMUNITY
Start: 2020-06-18 | End: 1900-01-01

## 2022-10-23 ENCOUNTER — RX RENEWAL (OUTPATIENT)
Age: 87
End: 2022-10-23

## 2022-10-23 RX ORDER — APIXABAN 5 MG/1
5 TABLET, FILM COATED ORAL
Qty: 180 | Refills: 3 | Status: ACTIVE | COMMUNITY
Start: 2020-07-20 | End: 1900-01-01

## 2023-04-01 NOTE — DISCHARGE NOTE NURSING/CASE MANAGEMENT/SOCIAL WORK - NSSCCARECORD_GEN_ALL_CORE
.[facility]

## 2023-06-27 ENCOUNTER — APPOINTMENT (OUTPATIENT)
Dept: CARDIOLOGY | Facility: CLINIC | Age: 88
End: 2023-06-27

## 2024-02-29 NOTE — ED ADULT NURSE NOTE - NS ED NURSE LEVEL OF CONSCIOUSNESS AFFECT
LSW was informed that pt has been approved to go to [facility].  REBEL PS the  NP and informed her of the approval.     Appropriate

## 2024-10-16 NOTE — DISCHARGE NOTE PROVIDER - CARE PROVIDER_API CALL
Problem: Chronic Conditions and Co-morbidities  Goal: Patient's chronic conditions and co-morbidity symptoms are monitored and maintained or improved  Outcome: Progressing     Problem: Discharge Planning  Goal: Discharge to home or other facility with appropriate resources  Outcome: Progressing     Problem: Pain  Goal: Verbalizes/displays adequate comfort level or baseline comfort level  Outcome: Progressing      Vladimir Mendieta (MD)  Cardiovascular Disease; Internal Medicine  [address] [facility] [address]  [place] [address]  Phone: [phone]  Fax: [phone]  Follow Up Time: Vladimir Mendieta)  Cardiovascular Disease; Internal Medicine  [address] [facility] [facility] [address]  [place] [address]  Phone: [phone]  Fax: [phone]  Follow Up Time:     Harish Joya)  Cardiac Electrophysiology; Cardiology  [address] [place] [address]  Phone: [phone]  Fax: [phone]  Follow Up Time: Vladimir Mendieta)  Cardiovascular Disease; Internal Medicine  [address] [facility]  [place] [address]  Phone: [phone]  Fax: [phone]  Follow Up Time:     Harish Joya)  Cardiac Electrophysiology; Cardiology  [address] [place] [address]  Phone: [phone]  Fax: [phone]  Follow Up Time:     Igancio Rollins)  Internal Medicine; Pulmonary Disease  [address] [facility], [address] [place] [id number]  Phone: [phone]  Fax: [phone]  Follow Up Time: 1 week

## 2025-01-31 NOTE — PROGRESS NOTE ADULT - PROBLEM SELECTOR PLAN 2
Saturnino Desir.
- poss 2/2 dehydration or metformin  - cont IVF  - no obviou s/s infection at this time  - monitor off abx
off antibiotics  resolving  will trend to normal
off antibiotics  resolving  will trend to normal
